# Patient Record
Sex: MALE | Race: WHITE | NOT HISPANIC OR LATINO | Employment: OTHER | URBAN - METROPOLITAN AREA
[De-identification: names, ages, dates, MRNs, and addresses within clinical notes are randomized per-mention and may not be internally consistent; named-entity substitution may affect disease eponyms.]

---

## 2017-01-10 ENCOUNTER — TRANSFERRED RECORDS (OUTPATIENT)
Dept: HEALTH INFORMATION MANAGEMENT | Facility: CLINIC | Age: 77
End: 2017-01-10

## 2017-02-21 ENCOUNTER — TRANSFERRED RECORDS (OUTPATIENT)
Dept: HEALTH INFORMATION MANAGEMENT | Facility: CLINIC | Age: 77
End: 2017-02-21

## 2017-06-24 ENCOUNTER — HEALTH MAINTENANCE LETTER (OUTPATIENT)
Age: 77
End: 2017-06-24

## 2017-06-30 ENCOUNTER — APPOINTMENT (OUTPATIENT)
Age: 77
Setting detail: DERMATOLOGY
End: 2017-07-02

## 2017-06-30 DIAGNOSIS — L82.0 INFLAMED SEBORRHEIC KERATOSIS: ICD-10-CM

## 2017-06-30 DIAGNOSIS — H61.03 CHONDRITIS OF EXTERNAL EAR: ICD-10-CM

## 2017-06-30 PROBLEM — H61.032 CHONDRITIS OF LEFT EXTERNAL EAR: Status: ACTIVE | Noted: 2017-06-30

## 2017-06-30 PROCEDURE — 99213 OFFICE O/P EST LOW 20 MIN: CPT | Mod: 25

## 2017-06-30 PROCEDURE — OTHER MIPS QUALITY: OTHER

## 2017-06-30 PROCEDURE — 17110 DESTRUCT B9 LESION 1-14: CPT

## 2017-06-30 PROCEDURE — OTHER LIQUID NITROGEN: OTHER

## 2017-06-30 PROCEDURE — OTHER TREATMENT REGIMEN: OTHER

## 2017-06-30 PROCEDURE — OTHER COUNSELING: OTHER

## 2017-06-30 ASSESSMENT — LOCATION ZONE DERM
LOCATION ZONE: FACE
LOCATION ZONE: EAR

## 2017-06-30 ASSESSMENT — LOCATION SIMPLE DESCRIPTION DERM
LOCATION SIMPLE: LEFT EAR
LOCATION SIMPLE: RIGHT CHEEK
LOCATION SIMPLE: INFERIOR FOREHEAD

## 2017-06-30 ASSESSMENT — LOCATION DETAILED DESCRIPTION DERM
LOCATION DETAILED: INFERIOR MID FOREHEAD
LOCATION DETAILED: LEFT ANTIHELIX
LOCATION DETAILED: RIGHT MEDIAL MALAR CHEEK

## 2017-06-30 NOTE — PROCEDURE: MIPS QUALITY
Detail Level: Detailed
Quality 431: Preventive Care And Screening: Unhealthy Alcohol Use - Screening: Patient screened for unhealthy alcohol use using a single question and scores less than 2 times per year
Quality 226: Preventive Care And Screening: Tobacco Use: Screening And Cessation Intervention: Patient screened for tobacco and never smoked
Quality 110: Preventive Care And Screening: Influenza Immunization: Influenza Immunization Administered during Influenza season
Quality 110: Preventive Care And Screening: Influenza Immunization: Influenza Immunization Ordered or Recommended, but not Administered
Quality 130: Documentation Of Current Medications In The Medical Record: Current Medications Documented

## 2017-06-30 NOTE — PROCEDURE: LIQUID NITROGEN
Medical Necessity Information: It is in your best interest to select a reason for this procedure from the list below. All of these items fulfill various CMS LCD requirements except the new and changing color options.
Consent: The patient's consent was obtained including but not limited to risks of crusting, scabbing, blistering, scarring, darker or lighter pigmentary change, recurrence, incomplete removal and infection.
Pared With?: 15 blade
Post-Care Instructions: I reviewed with the patient in detail post-care instructions. Patient is to wear sunprotection, and avoid picking at any of the treated lesions. Pt may apply Vaseline to crusted or scabbing areas.
Include Z78.9 (Other Specified Conditions Influencing Health Status) As An Associated Diagnosis?: No
Render Post-Care Instructions In Note?: yes
Duration Of Freeze Thaw-Cycle (Seconds): 10-15
Detail Level: Detailed
Number Of Freeze-Thaw Cycles: 1 freeze-thaw cycle
Medical Necessity Clause: This procedure was medically necessary because the lesions that were treated were:

## 2017-07-08 ENCOUNTER — APPOINTMENT (OUTPATIENT)
Dept: GENERAL RADIOLOGY | Facility: CLINIC | Age: 77
DRG: 184 | End: 2017-07-08
Attending: EMERGENCY MEDICINE
Payer: MEDICARE

## 2017-07-08 ENCOUNTER — HOSPITAL ENCOUNTER (INPATIENT)
Facility: CLINIC | Age: 77
LOS: 3 days | Discharge: HOME | DRG: 184 | End: 2017-07-11
Attending: EMERGENCY MEDICINE | Admitting: INTERNAL MEDICINE
Payer: MEDICARE

## 2017-07-08 DIAGNOSIS — S22.42XA CLOSED FRACTURE OF MULTIPLE RIBS OF LEFT SIDE, INITIAL ENCOUNTER: ICD-10-CM

## 2017-07-08 DIAGNOSIS — T14.8XXA ABRASION: ICD-10-CM

## 2017-07-08 DIAGNOSIS — K59.03 DRUG-INDUCED CONSTIPATION: Primary | ICD-10-CM

## 2017-07-08 DIAGNOSIS — J93.9 PNEUMOTHORAX ON LEFT: ICD-10-CM

## 2017-07-08 PROBLEM — S22.49XA MULTIPLE RIB FRACTURES: Status: ACTIVE | Noted: 2017-07-08

## 2017-07-08 LAB
ANION GAP SERPL CALCULATED.3IONS-SCNC: 7 MMOL/L (ref 3–14)
BUN SERPL-MCNC: 17 MG/DL (ref 7–30)
CALCIUM SERPL-MCNC: 9.2 MG/DL (ref 8.5–10.1)
CHLORIDE SERPL-SCNC: 109 MMOL/L (ref 94–109)
CO2 SERPL-SCNC: 27 MMOL/L (ref 20–32)
CREAT SERPL-MCNC: 0.85 MG/DL (ref 0.66–1.25)
ERYTHROCYTE [DISTWIDTH] IN BLOOD BY AUTOMATED COUNT: 13.7 % (ref 10–15)
GFR SERPL CREATININE-BSD FRML MDRD: 87 ML/MIN/1.7M2
GLUCOSE SERPL-MCNC: 125 MG/DL (ref 70–99)
HCT VFR BLD AUTO: 37.7 % (ref 40–53)
HGB BLD-MCNC: 12.2 G/DL (ref 13.3–17.7)
MCH RBC QN AUTO: 30.8 PG (ref 26.5–33)
MCHC RBC AUTO-ENTMCNC: 32.4 G/DL (ref 31.5–36.5)
MCV RBC AUTO: 95 FL (ref 78–100)
PLATELET # BLD AUTO: 204 10E9/L (ref 150–450)
POTASSIUM SERPL-SCNC: 3.8 MMOL/L (ref 3.4–5.3)
RADIOLOGIST FLAGS: ABNORMAL
RBC # BLD AUTO: 3.96 10E12/L (ref 4.4–5.9)
SODIUM SERPL-SCNC: 143 MMOL/L (ref 133–144)
WBC # BLD AUTO: 6.5 10E9/L (ref 4–11)

## 2017-07-08 PROCEDURE — 99222 1ST HOSP IP/OBS MODERATE 55: CPT | Performed by: SURGERY

## 2017-07-08 PROCEDURE — 99222 1ST HOSP IP/OBS MODERATE 55: CPT | Mod: AI | Performed by: INTERNAL MEDICINE

## 2017-07-08 PROCEDURE — 25000132 ZZH RX MED GY IP 250 OP 250 PS 637: Mod: GY | Performed by: INTERNAL MEDICINE

## 2017-07-08 PROCEDURE — 99285 EMERGENCY DEPT VISIT HI MDM: CPT | Mod: 25

## 2017-07-08 PROCEDURE — A9270 NON-COVERED ITEM OR SERVICE: HCPCS | Mod: GY | Performed by: EMERGENCY MEDICINE

## 2017-07-08 PROCEDURE — 96374 THER/PROPH/DIAG INJ IV PUSH: CPT

## 2017-07-08 PROCEDURE — 99238 HOSP IP/OBS DSCHRG MGMT 30/<: CPT | Performed by: INTERNAL MEDICINE

## 2017-07-08 PROCEDURE — 85027 COMPLETE CBC AUTOMATED: CPT | Performed by: EMERGENCY MEDICINE

## 2017-07-08 PROCEDURE — A9270 NON-COVERED ITEM OR SERVICE: HCPCS | Mod: GY | Performed by: INTERNAL MEDICINE

## 2017-07-08 PROCEDURE — 12000007 ZZH R&B INTERMEDIATE

## 2017-07-08 PROCEDURE — 25000132 ZZH RX MED GY IP 250 OP 250 PS 637: Mod: GY | Performed by: EMERGENCY MEDICINE

## 2017-07-08 PROCEDURE — 80048 BASIC METABOLIC PNL TOTAL CA: CPT | Performed by: EMERGENCY MEDICINE

## 2017-07-08 PROCEDURE — 71101 X-RAY EXAM UNILAT RIBS/CHEST: CPT | Mod: LT

## 2017-07-08 PROCEDURE — 96376 TX/PRO/DX INJ SAME DRUG ADON: CPT

## 2017-07-08 PROCEDURE — 25000128 H RX IP 250 OP 636: Performed by: EMERGENCY MEDICINE

## 2017-07-08 RX ORDER — HYDROMORPHONE HYDROCHLORIDE 1 MG/ML
0.25 INJECTION, SOLUTION INTRAMUSCULAR; INTRAVENOUS; SUBCUTANEOUS EVERY 10 MIN PRN
Status: COMPLETED | OUTPATIENT
Start: 2017-07-08 | End: 2017-07-08

## 2017-07-08 RX ORDER — OXYCODONE HYDROCHLORIDE 5 MG/1
5-10 TABLET ORAL EVERY 4 HOURS PRN
Status: DISCONTINUED | OUTPATIENT
Start: 2017-07-08 | End: 2017-07-11 | Stop reason: HOSPADM

## 2017-07-08 RX ORDER — ONDANSETRON 4 MG/1
4 TABLET, ORALLY DISINTEGRATING ORAL EVERY 6 HOURS PRN
Status: DISCONTINUED | OUTPATIENT
Start: 2017-07-08 | End: 2017-07-11 | Stop reason: HOSPADM

## 2017-07-08 RX ORDER — ACETAMINOPHEN 325 MG/1
975 TABLET ORAL 3 TIMES DAILY
Status: DISCONTINUED | OUTPATIENT
Start: 2017-07-08 | End: 2017-07-11 | Stop reason: HOSPADM

## 2017-07-08 RX ORDER — AMOXICILLIN 250 MG
1-2 CAPSULE ORAL 2 TIMES DAILY PRN
Status: DISCONTINUED | OUTPATIENT
Start: 2017-07-08 | End: 2017-07-11 | Stop reason: HOSPADM

## 2017-07-08 RX ORDER — NALOXONE HYDROCHLORIDE 0.4 MG/ML
.1-.4 INJECTION, SOLUTION INTRAMUSCULAR; INTRAVENOUS; SUBCUTANEOUS
Status: DISCONTINUED | OUTPATIENT
Start: 2017-07-08 | End: 2017-07-11 | Stop reason: HOSPADM

## 2017-07-08 RX ORDER — HYDROMORPHONE HYDROCHLORIDE 1 MG/ML
.3-.5 INJECTION, SOLUTION INTRAMUSCULAR; INTRAVENOUS; SUBCUTANEOUS
Status: DISCONTINUED | OUTPATIENT
Start: 2017-07-08 | End: 2017-07-11 | Stop reason: HOSPADM

## 2017-07-08 RX ORDER — ONDANSETRON 2 MG/ML
4 INJECTION INTRAMUSCULAR; INTRAVENOUS EVERY 6 HOURS PRN
Status: DISCONTINUED | OUTPATIENT
Start: 2017-07-08 | End: 2017-07-11 | Stop reason: HOSPADM

## 2017-07-08 RX ORDER — POLYETHYLENE GLYCOL 3350 17 G/17G
17 POWDER, FOR SOLUTION ORAL DAILY PRN
Status: DISCONTINUED | OUTPATIENT
Start: 2017-07-08 | End: 2017-07-11 | Stop reason: HOSPADM

## 2017-07-08 RX ORDER — OXYCODONE HYDROCHLORIDE 5 MG/1
5 TABLET ORAL ONCE
Status: COMPLETED | OUTPATIENT
Start: 2017-07-08 | End: 2017-07-08

## 2017-07-08 RX ORDER — PERPHENAZINE/AMITRIPTYLINE HCL 4 MG-25 MG
40 TABLET ORAL AT BEDTIME
COMMUNITY

## 2017-07-08 RX ADMIN — HYDROMORPHONE HYDROCHLORIDE 0.25 MG: 1 INJECTION, SOLUTION INTRAMUSCULAR; INTRAVENOUS; SUBCUTANEOUS at 20:54

## 2017-07-08 RX ADMIN — HYDROMORPHONE HYDROCHLORIDE 0.25 MG: 1 INJECTION, SOLUTION INTRAMUSCULAR; INTRAVENOUS; SUBCUTANEOUS at 18:10

## 2017-07-08 RX ADMIN — OXYCODONE HYDROCHLORIDE 5 MG: 5 TABLET ORAL at 17:18

## 2017-07-08 RX ADMIN — ACETAMINOPHEN 975 MG: 325 TABLET, FILM COATED ORAL at 21:57

## 2017-07-08 RX ADMIN — OMEPRAZOLE 20 MG: 20 CAPSULE, DELAYED RELEASE ORAL at 21:57

## 2017-07-08 RX ADMIN — HYDROMORPHONE HYDROCHLORIDE 0.25 MG: 1 INJECTION, SOLUTION INTRAMUSCULAR; INTRAVENOUS; SUBCUTANEOUS at 18:20

## 2017-07-08 ASSESSMENT — ENCOUNTER SYMPTOMS
NECK PAIN: 0
DIZZINESS: 0
BACK PAIN: 0
SHORTNESS OF BREATH: 0
LIGHT-HEADEDNESS: 0
ARTHRALGIAS: 0
HEADACHES: 0
WOUND: 1
VOMITING: 0
WEAKNESS: 0
ABDOMINAL PAIN: 0

## 2017-07-08 NOTE — ED NOTES
"[facility]  ED Nurse Handoff Report    ED Chief complaint: Fall (fell off short step stool. landed onto paint cans under left arm. fell about 2 ft. did not hit head. no LOC. abrasion to left ribs ) and Rib Pain      ED Diagnosis:   Final diagnoses:   Closed fracture of multiple ribs of left side, initial encounter   Pneumothorax on left   Abrasion       Code Status: Full Code    Allergies:   Allergies   Allergen Reactions     No Known Drug Allergies        Activity level - Baseline/Home:  Independent    Activity Level - Current:   Stand with Assist     Needed?: No    Isolation: No  Infection: Not Applicable    Bariatric?: No    Vital Signs:   Vitals:    07/08/17 1656 07/08/17 1730   BP: 118/68    Pulse: 55    Resp: 22    Temp: 98.6  F (37  C)    TempSrc: Temporal    SpO2: 97% 99%   Weight: 72.6 kg (160 lb)    Height: 1.753 m (5' 9\")        Cardiac Rhythm: ,        Pain level: 0-10 Pain Scale: 10    Is this patient confused?: No    Patient Report: Initial Complaint: Pt. Otherwise healthy, fell off one step ladder and hit arm and left side of chest on paint cans. Pt. States he did not get dizzy or have pain before he fell. No LOC reported, pt recalls all events.  Focused Assessment: Pt. In some distress with deep breathing and pain with movement. Rates pain 10/10. Sats 96-99% on RA. Pt. Is A/O x 4, lives at home with wife.   Tests Performed: labs, xray  Abnormal Results: left pneumothorax with rib fractures, labs pending  Treatments provided: Oxy IR 5 mg, dilaudid .25mg    Family Comments: Wife at home, hosting party tonight of 12 people.    OBS brochure/video discussed/provided to patient: N/A    ED Medications:   Medications   HYDROmorphone (PF) (DILAUDID) injection 0.25 mg (0.25 mg Intravenous Given 7/8/17 1810)   oxyCODONE (ROXICODONE) IR tablet 5 mg (5 mg Oral Given 7/8/17 1718)       Drips infusing?:  No      ED NURSE PHONE NUMBER: Amanda [phone]         "

## 2017-07-08 NOTE — PROGRESS NOTES
Trauma service  Full note dictated. Patient examined, films reviewed in detail. Low level fall with left chest pain. Several rib fractures, tiny left pneumothorax.   OK to eat, no need for campos. Will check CXR in morning. Follow with you.

## 2017-07-08 NOTE — IP AVS SNAPSHOT
[facility]    [address] [place] [address]    Phone:  [phone]                                       After Visit Summary   7/8/2017    Juwan Montoya    MRN: [id number]           After Visit Summary Signature Page     I have received my discharge instructions, and my questions have been answered. I have discussed any challenges I see with this plan with the nurse or doctor.    ..........................................................................................................................................  Patient/Patient Representative Signature      ..........................................................................................................................................  Patient Representative Print Name and Relationship to Patient    ..................................................               ................................................  Date                                            Time    ..........................................................................................................................................  Reviewed by Signature/Title    ...................................................              ..............................................  Date                                                            Time

## 2017-07-08 NOTE — H&P
[facility]    History and Physical  Hospitalist       Date of Admission:  7/8/2017    Assessment & Plan   Juwan Montoya is a relatively healthy 77 year old male with PMHx of GERD and hypertension (not presently on medications for BP) who presented to ED with with left sided rib pain after a mechanical fall this afternoon. He was found to have L sided rib fractures and a small pneumothorax. He is being admitted for ongoing pain management.     Left Rib Fractures after mechanical fall:  Occurred after a fall off a step-stool where he landed on some paint cans on his left side. Xrays of the chest and ribs show left 4th-5h posterior-lateral rib fractures and anterior 5th-6th rib fractures as well as a small apical pneumothorax.   -- trauma surgery consulted given consecutive fractures (Dr. Carmichael notified Dr. Robbins of patient's admission)  -- pain control -- have ordered Tylenol 1000mg TID plus prn oxycodone (5-10mg q4h prn) with prn IV dilaudid for breakthrough pain  -- could also consider use of lidoderm patch  -- pulmonary toilet with incentive spirometer, encourage frequent ambulation    Small L Apical Pneumothorax:  Dr. Carmichael reviewed imaging with Dr. Ortiz -- no need for chest tube placement at this time  -- supplemental O2  -- repeat CXR in AM or sooner if needed    GERD:  Chronic and stable on PPI (takes every other day)    Hypertension:  Well managed without medications at baseline.     Chronic Normocytic Anemia:  Mild. Hgb 12.2 on admission, this seems to be around his baseline.    No other hematomas or bruising was noted.    DVT Prophylaxis: PCDS  Code Status: Full Code    Disposition: Admitted under inpatient status. Anticipate discharge in 1-2d, pending respiratory status remains stable and once pain adequately controlled.    Adry Shelton DO    Primary Care Physician   Dipesh Oviedo MD    Chief Complaint   L chest wall pain after fall    History of Present  Illness   Juwan Montoya is a 77 year old male with PMHx of GERD and hypertension (well managed w/o BP medications) who presented to the ED this afternoon for evaluation of left sided rib pain which occurred after a fall. He states he fell off a step-stool (about 1 1/2 ft off the ground) while doing some work around the house and landed on top of 2-3 paint cans on his left side. He denies dizziness or lightheadedness preceding his fall. He developed left sided rib pain after the fall, prompting presentation to the ED. He has otherwise been in his usual status of health and denies recent illness or changes in medications. He does not endorse any pain elsewhere.     In the ED, he was seen by Dr. Carmichael. Afebrile and hemodynamically stable. Sats in 90s on RA. Labs okay. Xrays of the chest and ribs showed left sided rib fractures and a small left apical pneumothorax. Dr. Carmichael discussed the findings with Dr. Ortiz -- no indication for chest tube placement given his stable respiratory status. He was given oxycodone and IV dilaudid in the ED for management of his pain.     Past Medical History    GERD  Hypertension (not on meds)  BPH s/p TURP  Hx of nephrolithiasis    Past Surgical History   TURP in 1994  Hx of cystourethroscopy with laser ablation of uretereal stone in 2008  ORIF of R ankle fracture in 2009 with removal of hardware from ankle in 2010    Social History   Tobacco -- former smoker, smoked 1ppd x30yrs  EtOH -- drinks alcohol socially, not daily  Drugs -- no hx of drug use    Family History   Denied significant medical problems in family members    Review of Systems   The 10 point Review of Systems is negative unless otherwise stated per HPI.    Prior to Admission Medications   Prior to Admission medications    Medication Sig Last Dose Taking? Auth Provider   COENZYME Q-10 PO Take 100 mg by mouth At Bedtime 7/7/2017 at pm Yes Unknown, Entered By History   Lutein 40 MG CAPS Take 40 mg by mouth At Bedtime  7/7/2017 at pm Yes Unknown, Entered By History   TURMERIC PO Take 1 capsule by mouth At Bedtime 7/7/2017 at pm Yes Unknown, Entered By History   UNABLE TO FIND MEDICATION NAME: Prostate supplement 7/7/2017 at pm Yes Reported, Patient   PRILOSEC OTC 20 MG OR TBEC 1 TABLET EVERY OTHER DAY AT NIGHT 7/6/2017 at pm Yes Daniel Solitario MD       Allergies   Allergies   Allergen Reactions     No Known Drug Allergies        Physical Exam   Temp: 98.6  F (37  C) Temp src: Temporal BP: 118/68 Pulse: 55   Resp: 22 SpO2: 99 % O2 Device: None (Room air)      Vital Signs with Ranges  Temp:  [98.6  F (37  C)] 98.6  F (37  C)  Pulse:  [55] 55  Resp:  [22] 22  BP: (118)/(68) 118/68  SpO2:  [97 %-99 %] 99 %  160 lbs 0 oz    Constitutional: Resting comfortably, alert and conversing appropriately, NAD  HEENT: PERRLA, EOMI, MMM  Respiratory: CTAB, no wheeze/rales/rhonchi  Cardiovascular: HRRR, no MGR  GI: S, NT, ND, +BS  Skin: warm/dry, no ecchymosis or hematomas  Musculoskeletal: +pain with palpation of L chest wall  Neurologic: CNs intact, strength/sensation intact and symmetric in bilateral UE/LEs      Data   Data reviewed today:  I personally reviewed the chest x-ray image(s) showing small L apical pneumothorax.    Recent Labs  Lab 07/08/17  1800   WBC 6.5   HGB 12.2*   MCV 95         POTASSIUM 3.8   CHLORIDE 109   CO2 27   BUN 17   CR 0.85   ANIONGAP 7   CAT 9.2   *       Recent Results (from the past 24 hour(s))   Ribs XR, unilat 3 views + PA chest,  left   Result Value    Radiologist flags Left pneumothorax (AA)    Narrative    XR RIBS & CHEST LT 3VW7/8/2017 5:43 PM     HISTORY:   fall, lateral trauma    COMPARISON: None.    FINDINGS:  There are fractures of the left fourth and fifth ribs  posterior-lateral ribs. There also appears to be a fracture of the  left fifth and sixth anterior ribs. There is a small left apical  pneumothorax.      Impression    IMPRESSION:   1. Left rib fractures.  2. Small left  apical pneumothorax.    [Critical Result: Left pneumothorax]    Finding was identified on 7/8/2017 5:45 PM.     Dr. Carmichael was contacted by me on 7/8/2017 5:49 PM and verbalized  understanding of the critical result.

## 2017-07-08 NOTE — PHARMACY-ADMISSION MEDICATION HISTORY
Admission medication history interview status for the 7/8/2017  admission is complete. See EPIC admission navigator for prior to admission medications     Medication history source reliability:Moderate    Actions taken by pharmacist (provider contacted, etc):None     Additional medication history information not noted on PTA med list :None    Medication reconciliation/reorder completed by provider prior to medication history? No    Time spent in this activity: 10 minutes      Prior to Admission medications    Medication Sig Last Dose Taking? Auth Provider   COENZYME Q-10 PO Take 100 mg by mouth At Bedtime 7/7/2017 at pm Yes Unknown, Entered By History   Lutein 40 MG CAPS Take 40 mg by mouth At Bedtime 7/7/2017 at pm Yes Unknown, Entered By History   TURMERIC PO Take 1 capsule by mouth At Bedtime 7/7/2017 at pm Yes Unknown, Entered By History   UNABLE TO FIND MEDICATION NAME: Prostate supplement 7/7/2017 at pm Yes Reported, Patient   PRILOSEC OTC 20 MG OR TBEC 1 TABLET EVERY OTHER DAY AT NIGHT 7/6/2017 at pm Yes Daniel Solitario MD

## 2017-07-08 NOTE — IP AVS SNAPSHOT
MRN:[id number]                      After Visit Summary   7/8/2017    Juwan Montoya    MRN: [id number]           Thank you!     Thank you for choosing [facility] for your care. Our goal is always to provide you with excellent care. Hearing back from our patients is one way we can continue to improve our services. Please take a few minutes to complete the written survey that you may receive in the mail after you visit with us. Thank you!        Patient Information     Date Of Birth          1940        Designated Caregiver       Most Recent Value    Caregiver    Will someone help with your care after discharge? yes    Name of designated caregiver Jackeline    Phone number of caregiver [phone]    Caregiver address [address]      About your hospital stay     You were admitted on:  July 8, 2017 You last received care in the:  [facility] [facility]    You were discharged on:  July 11, 2017        Reason for your hospital stay       Fall, rib fractures, small pneumothorax.                  Who to Call     For medical emergencies, please call 911.  For non-urgent questions about your medical care, please call your primary care provider or clinic, [phone]          Attending Provider     Provider Specialty    Iban Carmichael MD Emergency Medicine    Adry Shelton DO Internal Medicine       Primary Care Provider Office Phone # Fax #    Dipesh Oviedo [phone]      After Care Instructions     Activity       Avoid strenuous activity or heavy lifting for several weeks until you are pain-free and are cleared by Dr. Oviedo for regular activity.    USE YOUR INCENTIVE SPIROMETER (clear/blue breathing tube) 10 times every hour for the next week while awake and able.            Diet       Follow this diet upon discharge: Regular Diet Adult            Discharge Instructions       If you have any questions or concerns please  "call the General Surgery / Trauma Surgery office at [phone]. You were seen by Dr. Helio Robbins and Dr. Adonis Tejada during your hospital stay.  Our clinic's name is [facility]. The address is [facility] [address], [address], [place], [place], [address]                  Follow-up Appointments     Follow-up and recommended labs and tests        Make a follow-up appointment to see Dr. Oviedo in the next 2 weeks. Call his office to make this appointment ([phone]).            Follow-up and recommended labs and tests        Follow up with primary care provider, Dipesh Oviedo MD, within 7 days recent hospitalization..  No follow up labs or test are needed.                  Pending Results     No orders found from 7/6/2017 to 7/9/2017.            Statement of Approval     Ordered          07/11/17 1219  I have reviewed and agree with all the recommendations and orders detailed in this document.  EFFECTIVE NOW     Approved and electronically signed by:  Praveena Griffith MD             Admission Information     Date & Time Provider Department Dept. Phone    7/8/2017 Adry Shelton DO [facility] [address] [facility] [phone]      Your Vitals Were     Blood Pressure Pulse Temperature Respirations Height Weight    136/75 70 98.2  F (36.8  C) (Oral) 18 1.753 m (5' 9\") 72.6 kg (160 lb)    Pulse Oximetry BMI (Body Mass Index)                97% 23.63 kg/m2          [employer] Information     [employer] lets you send messages to your doctor, view your test results, renew your prescriptions, schedule appointments and more. To sign up, go to [email or web address] . Click on \"Log in\" on the left side of the screen, which will take you to the Welcome page. Then click on \"Sign up Now\" on the right side of the page.     You will be asked to enter the access code listed below, as well as some personal information. Please follow the directions to create your username and password.     Your " access code is: [email or web address]  Expires: 10/9/2017 12:29 PM     Your access code will  in 90 days. If you need help or a new code, please call your [facility] clinic or [phone].        Care EveryWhere ID     This is your Care EveryWhere ID. This could be used by other organizations to access your [facility] medical records  [id number]        Equal Access to Services     MAURICIO BECKER : Hadii aad ku hadasho Soomaali, waaxda luqadaha, qaybta kaalmada adeegyada, aric thurman ejn salas ranjana magdalene . So [facility] [phone].    ATENCIÓN: Si habla español, tiene a cochran disposición servicios gratuitos de asistencia lingüística. Llame al [phone].    We comply with applicable federal civil rights laws and [place] laws. We do not discriminate on the basis of race, color, national origin, age, disability sex, sexual orientation or gender identity.               Review of your medicines      START taking        Dose / Directions    acetaminophen 325 MG tablet   Commonly known as:  TYLENOL   Used for:  Closed fracture of multiple ribs of left side, initial encounter        Dose:  975 mg   Take 3 tablets (975 mg) by mouth 3 times daily   Quantity:  100 tablet   Refills:  0       cyclobenzaprine 5 MG tablet   Commonly known as:  FLEXERIL   Used for:  Closed fracture of multiple ribs of left side, initial encounter        Dose:  5 mg   Take 1 tablet (5 mg) by mouth 3 times daily as needed for muscle spasms   Quantity:  20 tablet   Refills:  1       oxyCODONE 5 MG IR tablet   Commonly known as:  ROXICODONE   Used for:  Closed fracture of multiple ribs of left side, initial encounter        Dose:  5-10 mg   Take 1-2 tablets (5-10 mg) by mouth every 3 hours   Quantity:  30 tablet   Refills:  0       polyethylene glycol Packet   Commonly known as:  MIRALAX/GLYCOLAX   Used for:  Drug-induced constipation        Dose:  17 g   Take 17 g by mouth daily as needed for constipation   Quantity:  7 packet   Refills:  0        senna-docusate 8.6-50 MG per tablet   Commonly known as:  SENOKOT-S;PERICOLACE   Used for:  Drug-induced constipation        Dose:  1 tablet   Take 1 tablet by mouth 2 times daily   Quantity:  100 tablet   Refills:  0         CONTINUE these medicines which have NOT CHANGED        Dose / Directions    COENZYME Q-10 PO        Dose:  100 mg   Take 100 mg by mouth At Bedtime   Refills:  0       Lutein 40 MG Caps        Dose:  40 mg   Take 40 mg by mouth At Bedtime   Refills:  0       priLOSEC OTC 20 MG tablet   Used for:  Esophageal reflux   Generic drug:  omeprazole        1 TABLET EVERY OTHER DAY AT NIGHT   Quantity:  14   Refills:  0       TURMERIC PO        Dose:  1 capsule   Take 1 capsule by mouth At Bedtime   Refills:  0       UNABLE TO FIND        MEDICATION NAME: Prostate supplement   Refills:  0            Where to get your medicines      These medications were sent to [facility] [address] [place] [address]     Phone:  [phone]     cyclobenzaprine 5 MG tablet    polyethylene glycol Packet    senna-docusate 8.6-50 MG per tablet         Some of these will need a paper prescription and others can be bought over the counter. Ask your nurse if you have questions.     Bring a paper prescription for each of these medications     oxyCODONE 5 MG IR tablet       You don't need a prescription for these medications     acetaminophen 325 MG tablet                Protect others around you: Learn how to safely use, store and throw away your medicines at www.disposemymeds.org.             Medication List: This is a list of all your medications and when to take them. Check marks below indicate your daily home schedule. Keep this list as a reference.      Medications           Morning Afternoon Evening Bedtime As Needed    acetaminophen 325 MG tablet   Commonly known as:  TYLENOL   Take 3 tablets (975 mg) by mouth 3 times daily   Last time this was  given:  975 mg on 7/11/2017  9:01 AM                                         COENZYME Q-10 PO   Take 100 mg by mouth At Bedtime                                   cyclobenzaprine 5 MG tablet   Commonly known as:  FLEXERIL   Take 1 tablet (5 mg) by mouth 3 times daily as needed for muscle spasms   Last time this was given:  5 mg on 7/10/2017  8:35 AM                                   Lutein 40 MG Caps   Take 40 mg by mouth At Bedtime                                   oxyCODONE 5 MG IR tablet   Commonly known as:  ROXICODONE   Take 1-2 tablets (5-10 mg) by mouth every 3 hours   Last time this was given:  10 mg on 7/11/2017  1:59 PM                                   polyethylene glycol Packet   Commonly known as:  MIRALAX/GLYCOLAX   Take 17 g by mouth daily as needed for constipation                                   priLOSEC OTC 20 MG tablet   1 TABLET EVERY OTHER DAY AT NIGHT   Generic drug:  omeprazole                                   senna-docusate 8.6-50 MG per tablet   Commonly known as:  SENOKOT-S;PERICOLACE   Take 1 tablet by mouth 2 times daily   Last time this was given:  1 tablet on 7/10/2017  8:13 PM                                      TURMERIC PO   Take 1 capsule by mouth At Bedtime                                   UNABLE TO FIND   MEDICATION NAME: Prostate supplement   Last time this was given:  7/10/2017 12:26 PM                                          More Information        Treatment for Bone Bruise (Bone Contusion)  A bone bruise is an injury to a bone that is less severe than a bone fracture. Bone bruises are fairly common. They can happen to people of all ages. Any type of bone in your body can get a bone bruise. Other injuries often happen along with a bone bruise, such as damage to nearby ligaments.  Types of treatment  Treatment for a bone bruise may include:    Resting the bone or joint    Putting an ice pack on the area several times a day    Raising the injury above the level of your heart  to reduce swelling    Taking medicine to reduce pain and swelling    Wearing a brace or other device to limit movement, if needed  Your doctor may give you advice about your diet. This is because eating a diet that is rich in calcium, vitamin D, and protein can help you heal. Your doctor may ask you to not use certain over-the-counter medicines for pain. Some of these may delay normal bone healing. If you smoke, your doctor will advise you to stop smoking. Smoking can also delay bone healing.  Your health care provider will tell you how long you should avoid putting weight on your bone. Most bone bruises slowly heal over 2 to 4 months. A larger bone bruise may take longer to heal. You may not be able to return to sports activities for weeks or months. If your symptoms don t go away, your health care provider may give you an MRI.  Possible complications of a bone bruise  Most bone bruises heal without any problems. If your bone bruise is very large, your body may have trouble getting blood flow back to the area. This can cause avascular necrosis of the bone. This leads to death of that part of the bone.     When to call the health care provider  Call your health care provider if your symptoms don t start to get better in a few days. Call him or her right away if you have any severe symptoms, such as a high fever.      Date Last Reviewed: 7/21/2015 2000-2017 [employer]. [address] [place] [address]. All rights reserved. This information is not intended as a substitute for professional medical care. Always follow your healthcare professional's instructions.                Rib Fracture (Broken Rib)  Your ribs are curved bones in your chest. They help protect your lungs and expand and contract when you breathe. Children's ribs bend easily and can often withstand a blow or fall. But adult ribs are more likely to break (fracture) under stress. Even coughing or a hard sneeze can fracture a  rib.    When to go to the Emergency Room (ER)  Although they can be painful, most rib fractures aren't serious. But they often make it hard to cough or breathe deeply. Get medical care right away if you have:    Trouble breathing.    Nausea, vomiting, or stomach pain with a sore or bruised rib.    Pain that worsens over time.    An injury to the chest or stomach.  What to expect in the ER  Here is what will happen in the ER:     A healthcare provider will ask about your injury and examine you carefully.    An X-ray of your chest will likely be taken to show any major damage to ribs and lungs. However, ribs can undergo small breaks that do not show up on X-rays, even though they still hurt.    You may be given medicine to ease your discomfort.    Rarely, rib fractures can cause a lung to collapse or lead to bleeding in the chest. In these cases, a tube will be inserted into the chest to reinflate the lung or drain the blood.  Follow-up  You are likely to heal in 6 to 8 weeks. Most rib fractures heal on their own with no lasting effects. Call your healthcare provider right away if you notice any of these symptoms:    Increased chest pain    Shortness of breath    Fever    Coughing up blood  Date Last Reviewed: 9/26/2015 2000-2017 [employer]. [address] [place] [address], [place] [address]. All rights reserved. This information is not intended as a substitute for professional medical care. Always follow your healthcare professional's instructions.                Rib Fracture    You broke one or more ribs. This is called a rib fracture. Rib fractures do not require a cast like other bones. They will heal by themselves in about 4-6 weeks. The first 3-4 weeks will be the most painful because deep breathing, coughing, or changing position from sitting to lying down, may cause the broken ends to move slightly.  Home care    Rest. You should not be doing any heavy lifting or strenuous exertion until the pain goes  away.    It hurts to breathe when you have a broken rib. This puts you at risk of getting pneumonia from poor airflow through your lungs. To prevent this:    Take several very deep breaths once an hour while you're awake. Exhale through pursed lips as if you are blowing up a balloon. If possible, actually blow up a balloon or a rubber glove. This exercise builds up pressure inside the lung and prevents collapse of the small air sacs of the lung. This exercise may cause some pain at the site of injury, which is normal.    You may have gotten a breathing exercise device called an incentive spirometer. Use it at least 4 times a day, or as directed.    Apply an ice pack over the injured area for 15 to 20 minutes every 1 to 2 hours. You should do this for the first 24 to 48 hours. You can make an ice pack by filling a plastic bag that seals at the top with ice cubes and then wrapping it with a thin towel. Continue with ice packs as needed for the relief of pain and swelling.    You may use over-the-counter pain medicine to control pain, unless another pain medicine was prescribed. If you have chronic liver or kidney disease or ever had a stomach ulcer or GI bleeding, talk with your healthcare provider before using these medicines.    If your pain is not controlled, contact your healthcare provider. Sometimes a stronger pain medicine may be needed. A nerve block can be done in case of severe pain. It will numb the nerve between the ribs.  Follow-up care  Follow up with your healthcare provider, or as advised. Rarely, a broken rib will cause complications within the first few days that may not be evident during your initial exam. This can include collapsed lung, bleeding around the lung or into the abdomen, or pneumonia. Therefore, watch for the signs below.  If X-rays were taken, you will be told of any new findings that may affect your care.  Call 911  Call 911 if you have:    Dizziness, weakness or fainting    Shortness  of breath with or without chest discomfort    New or worsening abdominal pain    Discomfort in other areas of your upper body such as your shoulders, jaw, neck, or arms  When to seek medical advice  Call your healthcare provider right away if any of these occur:    Increasing chest pain with breathing    Fever of 100.4 F (38 C) or above lasting for 24 to 48 hours    Congested cough  Date Last Reviewed: 12/3/2015    4782-7602 [employer]. [address] [place] [address], [place] [address]. All rights reserved. This information is not intended as a substitute for professional medical care. Always follow your healthcare professional's instructions.

## 2017-07-08 NOTE — ED PROVIDER NOTES
"  History     Chief Complaint:  Fall and Rib Pain    HPI   Juwan Montoya is an otherwise healthy 77 year old male who presents to the ED for evaluation of left rib pain after a mechanical fall approximately 45 minutes prior to arrival.  The patient reports that he fell off a small step stool 1 foot off the ground and landed on 2-3 paint cans that were on the ground. The patient fell onto his left lateral chest. The patient reports he has had persistent pain in his left ribs since the fall and is concerned about a possible rib fracture, prompting the visit today. On arrival, the patient reports he has pain and an abrasion over his left lateral chest.  The patient also describes an intermittent \"rattling\" sensation in his chest when he breathes, but denies any shortness of breath. He denies any shoulder, arm, abdominal pain, or any other injuries associated with the fall. The patient did not hit his head or lose consciousness.      Allergies:  No known drug allergies    Medications:    Cholecalciferol  CO Q  Lutein  Prilosec    Past Medical History:    Asymptomatic varicose veins  Depression  GERD  BPH  Hypertension     Past Surgical History:    TURP  Laser ablation of ureteral stone  ORIF right ankle    Family History:    History reviewed. No pertinent family history.     Social History:  Smoking status: Former smoker, quit 1961  Alcohol use: Socially   Marital Status:   [2]     Review of Systems   Respiratory: Negative for shortness of breath.    Cardiovascular: Positive for chest pain (rib pain).   Gastrointestinal: Negative for abdominal pain and vomiting.   Musculoskeletal: Negative for arthralgias, back pain, gait problem and neck pain.   Skin: Positive for wound.   Neurological: Negative for dizziness, weakness, light-headedness and headaches.   All other systems reviewed and are negative.      Physical Exam     Physical Exam     Patient Vitals for the past 24 hrs:   BP Temp Temp src Pulse Resp " "SpO2 Height Weight   07/08/17 1730 - - - - - 99 % - -   07/08/17 1656 118/68 98.6  F (37  C) Temporal 55 22 97 % 1.753 m (5' 9\") 72.6 kg (160 lb)       General: Alert and Interactive.   Head: No signs of trauma.   Mouth/Throat: Oropharynx is clear and moist.   Eyes: Conjunctivae are normal. Pupils are equal, round, and reactive to light.   Neck: Normal range of motion. No nuchal rigidity.   CV: Normal rate and regular rhythm.    Resp: Equal breath sounds. Effort normal and breath sounds normal. No respiratory distress.   GI: Soft. There is no tenderness or guarding.   MSK: Normal range of motion. no edema.   Neuro: The patient is alert and oriented to person, place, and time.  PERRLA, EOMI, strength in upper/lower extremities normal and symmetrical.   Sensation normal. Speech normal.  GCS eye subscore is 4. GCS verbal subscore is 5. GCS motor subscore is 6.   Skin: Abrasion proximal volar forearm. Abrasion over the left lateral chest. Skin is warm and dry. No rash noted.    Psych: normal mood and affect. behavior is normal.     Emergency Department Course   Imaging:  Radiographic findings were communicated with the patient who voiced understanding of the findings.    X-ray Ribs, unilateral, and PA Chest, left views:  1. Left rib fractures.  2. Small left apical pneumothorax.  Result per radiology.     Laboratory:  CBC: HGB 12.2(L)  WNL (WBC 6.5, )   BMP: Glucose 125(H) WNL (Creatinine 0.85)    Interventions:  1718: Oxycodone 5 mg oral  1810: Dilaudid 0.25 mg IV    Emergency Department Course:  Past medical records, nursing notes, and vitals reviewed.  1708: I performed an exam of the patient and obtained history, as documented above.  The patient was sent for a rib x-ray while in the emergency department, findings above.    1749: I spoke to Dr. Velasquez of radiology regarding x-ray results.   1753: I rechecked the patient. Explained findings to the patient.    1756: I spoke to Dr. Ortiz of thoracic surgery who " states there is no indication for a chest tube or surgery.     1804: I spoke to Dr. Shelton of the hospitalist service who accepts the patient for admission.   1805: I spoke to Dr. Robbins of trauma surgery.   IV inserted and blood drawn.    Findings and plan explained to the Patient who consents to admission.   Discussed the patient with Dr. Shelton, who will admit the patient to a surgical bed for further monitoring, evaluation, and treatment.     Impression & Plan      Medical Decision Making:  This patient is presenting to the ER after falling as described above. The mechanism of injury does not warrant a trauma activation. The patient has multiple rib fractures and a small apical pneumothorax. No indication for chest tube placement at this time. Consultation was made with Dr. Ortiz as well as Dr. Robbins.   Because of the patient's pain level that requires IV narcotics to control, he will require admission to the hospital. He will also require admission to be monitored for increasing pneumothorax that could result in respiratory compromise and/or tension pneumothorax.      Diagnosis:    ICD-10-CM   1. Closed fracture of multiple ribs of left side, initial encounter S22.42XA   2. Pneumothorax on left J93.9   3. Abrasion T14.8     Disposition: Admitted to a surgical bed under the care of Dr. Nikita Owens  7/8/2017    EMERGENCY DEPARTMENT    I, Darya Owens, am serving as a scribe at 5:08 PM on 7/8/2017 to document services personally performed by Iban Carmichael MD based on my observations and the provider's statements to me.        Iban Carmichale MD  07/09/17 0102

## 2017-07-09 ENCOUNTER — APPOINTMENT (OUTPATIENT)
Dept: GENERAL RADIOLOGY | Facility: CLINIC | Age: 77
DRG: 184 | End: 2017-07-09
Attending: INTERNAL MEDICINE
Payer: MEDICARE

## 2017-07-09 PROCEDURE — A9270 NON-COVERED ITEM OR SERVICE: HCPCS | Mod: GY | Performed by: INTERNAL MEDICINE

## 2017-07-09 PROCEDURE — 25000132 ZZH RX MED GY IP 250 OP 250 PS 637: Mod: GY | Performed by: INTERNAL MEDICINE

## 2017-07-09 PROCEDURE — 25000128 H RX IP 250 OP 636: Performed by: INTERNAL MEDICINE

## 2017-07-09 PROCEDURE — 99232 SBSQ HOSP IP/OBS MODERATE 35: CPT | Performed by: INTERNAL MEDICINE

## 2017-07-09 PROCEDURE — 71010 XR CHEST PORT 1 VW: CPT

## 2017-07-09 PROCEDURE — 12000007 ZZH R&B INTERMEDIATE

## 2017-07-09 PROCEDURE — 99231 SBSQ HOSP IP/OBS SF/LOW 25: CPT | Performed by: SURGERY

## 2017-07-09 RX ORDER — CYCLOBENZAPRINE HCL 5 MG
5 TABLET ORAL 3 TIMES DAILY PRN
Status: DISCONTINUED | OUTPATIENT
Start: 2017-07-09 | End: 2017-07-11 | Stop reason: HOSPADM

## 2017-07-09 RX ADMIN — OXYCODONE HYDROCHLORIDE 10 MG: 5 TABLET ORAL at 05:19

## 2017-07-09 RX ADMIN — ACETAMINOPHEN 975 MG: 325 TABLET, FILM COATED ORAL at 21:57

## 2017-07-09 RX ADMIN — OXYCODONE HYDROCHLORIDE 5 MG: 5 TABLET ORAL at 01:14

## 2017-07-09 RX ADMIN — CYCLOBENZAPRINE HYDROCHLORIDE 5 MG: 5 TABLET, FILM COATED ORAL at 16:30

## 2017-07-09 RX ADMIN — OXYCODONE HYDROCHLORIDE 10 MG: 5 TABLET ORAL at 20:01

## 2017-07-09 RX ADMIN — OXYCODONE HYDROCHLORIDE 10 MG: 5 TABLET ORAL at 14:16

## 2017-07-09 RX ADMIN — HYDROMORPHONE HYDROCHLORIDE 0.3 MG: 1 INJECTION, SOLUTION INTRAMUSCULAR; INTRAVENOUS; SUBCUTANEOUS at 17:12

## 2017-07-09 RX ADMIN — ACETAMINOPHEN 975 MG: 325 TABLET, FILM COATED ORAL at 09:05

## 2017-07-09 RX ADMIN — MENTHOL 1 PATCH: 205.5 PATCH TOPICAL at 16:17

## 2017-07-09 RX ADMIN — HYDROMORPHONE HYDROCHLORIDE 0.3 MG: 1 INJECTION, SOLUTION INTRAMUSCULAR; INTRAVENOUS; SUBCUTANEOUS at 06:45

## 2017-07-09 RX ADMIN — HYDROMORPHONE HYDROCHLORIDE 0.3 MG: 1 INJECTION, SOLUTION INTRAMUSCULAR; INTRAVENOUS; SUBCUTANEOUS at 02:48

## 2017-07-09 RX ADMIN — ACETAMINOPHEN 975 MG: 325 TABLET, FILM COATED ORAL at 16:17

## 2017-07-09 RX ADMIN — OMEPRAZOLE 20 MG: 20 CAPSULE, DELAYED RELEASE ORAL at 21:57

## 2017-07-09 RX ADMIN — OXYCODONE HYDROCHLORIDE 10 MG: 5 TABLET ORAL at 09:06

## 2017-07-09 NOTE — PLAN OF CARE
Problem: Goal Outcome Summary  Goal: Goal Outcome Summary  Outcome: No Change  VSS, on 2L O2. LS are clear, BS are audible, passing gas. Left sharlene dressing is CDI.  Had x-ray done this morning, awaiting result. Pain controlled with PRN dilaudid, and oxycodone. Pt voiding adequately. SBA of 1.

## 2017-07-09 NOTE — PLAN OF CARE
Problem: Goal Outcome Summary  Goal: Goal Outcome Summary  Outcome: Improving  VSS on 2LPM. A/Ox4. Wound on Left elbow and left rib, dsg's CDI. Pain controlled with PRN Oxycodone and sched tylenol. Up with SBA.  Regular diet. BS faint, Flatus +. LS clear. IS to 2000.

## 2017-07-09 NOTE — PLAN OF CARE
Problem: Goal Outcome Summary  Goal: Goal Outcome Summary  Outcome: Improving  VSS, up ambulating, tylenol, oxycodone controlling pain, crackles to the Left lower and middle lobe, IS instructions given, TCDB, tolerating meals, dressings  CDI.

## 2017-07-09 NOTE — CONSULTS
General Surgery Trauma Consultation/H&P    Juwan Montoya MRN#: [id number]   Age: 77 year old YOB: 1940     Date of Admission:          7/8/2017  Reason for consult/H&P: Fall with chest injury   Surgeon:      Helio Robbins MD                  Chief Complaint:   Left chest pain         History of Present Illness:   This patient is a 77 year old  male who presented to the [facility] ER with left chest pain after a fall. Denies fever, chills, nausea, vomiting, change in BM or urination. No loss of consciousness, no abdominal, leg, arm, or head pain. He fell off a  One step ladder and hit paint cans with his left chest.  Denies having any previous episodes of chest trauma or abdominal surgery. He had a TURP.  History is obtained from the patient and chart.         Past Medical History:    has a past medical history of Asymptomatic varicose veins; Bladder stone; Chronic depressive personality disorder; Esophageal reflux; Hepatic cyst; Hypertrophy (benign) of prostate (9/94); Nephrolithiasis; S/P TURP; and Unspecified essential hypertension.          Past Surgical History:     Past Surgical History:   Procedure Laterality Date     C ANESTH,REMOVAL OF PROSTATE  9/1994    TURP     SURGICAL HISTORY OF -   7/2008    Cystourethroscopy, with laser ablation of ureteral stone     SURGICAL HISTORY OF -   3/2009    ORIF right ankle fracture     SURGICAL HISTORY OF -   6/2010    Removal of hardware, right ankle            Medications:     Prior to Admission medications    Medication Sig Start Date End Date Taking? Authorizing Provider   COENZYME Q-10 PO Take 100 mg by mouth At Bedtime   Yes Unknown, Entered By History   Lutein 40 MG CAPS Take 40 mg by mouth At Bedtime   Yes Unknown, Entered By History   TURMERIC PO Take 1 capsule by mouth At Bedtime   Yes Unknown, Entered By History   UNABLE TO FIND MEDICATION NAME: Prostate supplement   Yes Reported, Patient   PRILOSEC OTC 20 MG OR TBEC 1 TABLET  "EVERY OTHER DAY AT NIGHT 4/12/07  Yes Daniel Solitario MD            Allergies:     Allergies   Allergen Reactions     No Known Drug Allergies             Social History:     Social History   Substance Use Topics     Smoking status: Former Smoker     Packs/day: 1.00     Years: 30.00     Smokeless tobacco: Former User     Quit date: 12/6/1961      Comment: 2 packs per year     Alcohol use 0.0 oz/week     0 Standard drinks or equivalent per week      Comment: socially             Family History:    This patient has no significant relevant family history.  Family history is reviewed in detail.          Review of Systems:   Brief ROS is negative other than noted in the HPI.  C: NEGATIVE for fever, chills, change in weight  R: NEGATIVE for significant cough or SOB  CV: NEGATIVE for chest pain, palpitations or peripheral edema  GI:  NEGATIVE for dysuria, heartburn, or change in bowel habits  H: NEGATIVE for bleeding problems         Physical Exam:   Blood pressure 151/79, pulse 68, temperature 98.5  F (36.9  C), temperature source Oral, resp. rate 20, height 1.753 m (5' 9\"), weight 72.6 kg (160 lb), SpO2 96 %.       General - This is a well developed, well nourished male .  HEENT - Normocephalic. Atraumatic. Moist mucous membranes. Pupils equal.  No scleral icterus. Nose normal. No clinical skull or facial fractures.  Neck - Supple without masses. No cervical adenopathy or thyromegaly  Lungs - Breathing not labored  Chest -Very tender on left, anterior and posterior. CVA's nontender  Heart - Regular rate & rhythm   Abdomen - Soft, nontender, nondistended with +bowel sounds, no organomegaly.  Extremities - Moves all extremities. Warm without edema.  Neurologic - Nonfocal. Good strength in all fours          Data:   Labs:  Recent Labs   Lab Test  07/08/17   1800  12/09/16   1416  10/26/16   1220   WBC  6.5  4.8  4.2   HGB  12.2*  12.4*  11.7*   HCT  37.7*  39.0*  37.2*   PLT  204  207  245     Recent Labs   Lab Test  " 07/08/17   1800  06/27/16   1615  10/14/12   1320   POTASSIUM  3.8  4.7  4.0   CHLORIDE  109  108  104   CO2  27  30  25   BUN  17  16  16   CR  0.85  0.81  0.85     Recent Labs   Lab Test  06/27/16   1615  10/14/12   1320  02/23/11   1011   BILITOTAL  0.3  0.5  0.5   ALT  34  68  12   AST  18  84*  28   ALKPHOS  44  60  54   LIPASE   --   33   --      Recent Labs   Lab Test  06/22/10   1211   INR  0.99     Recent Labs   Lab Test  07/08/17   1800  06/27/16   1615  10/14/12   1320   CAT  9.2  9.4  9.1     Recent Labs   Lab Test  07/08/17   1800  06/27/16   1615  10/14/12   1320  02/23/11   1011   ANIONGAP  7  4  9  6   ALBUMIN   --   4.1  4.0  4.0       CT scan of the abdomen: not done    CXR: left rib fractures, tiny apical pneumothorax. Films reviewed in detail.         t         Assessment:   Low altitude fall with left rib fractures. I discussed with the patient how the main risk is pneumonia, and how pulmonary toilet will be key.          Plan:    Admit for observation, pain control. Check CXR in AM.       I have discussed the history, physical, and plan with the patient.   Helio Robbins M.D.

## 2017-07-09 NOTE — PROGRESS NOTES
[facility]    Hospitalist Progress Note  Shreyas Tompkins MD    Assessment & Plan     77 year old male with PMHx of GERD and Hypertension, not on any medication, who was admitted on 7/8/17, after a mechanical fall a step-stool, with trauma to the left side of his ribcage on 2-3 paint cans. Work up done on 7/8/17 revealed, normal BMP. CBC revealed, Hb 12.2, WBC 6.5 and Plts 204. X-rays Ribs and Left Chest on 7/8/17 revealed, fractures of the left fourth and fifth ribs posterior-lateral ribs. There also appears to be a fracture of the left fifth and sixth anterior ribs. There is a small left apical pneumothorax. He was admitted for ongoing pain management.      1. Left Ribs Fractures after mechanical fall: Appreciate trauma surgery review. For conservative management.   Continue scheduled Tylenol 975mg po tid. For Oxycodone IR prn. For IV dilaudid prn. For Flexeril 5mg prn. Continue incentive spirometry prn.      2. Small Left Apical Pneumothorax: resolved. Chest x-rays done on 7/9/17 revealed, the previously noted small left apical pneumothorax is not well appreciated. Mild atelectasis of the left lung base. There is left lateral chest wall subcutaneous emphysema. The right lung is clear. Continue O2.      3. GERD: Omeprazole 20mg po qhs.     4. Hypertension: Controlled. Not on any medication    5. Chronic Normocytic Anemia: Hb was 12.2g/dl on 7/8/17.       DVT Prophylaxis: Pneumatic Compression Devices  Code Status: Full Code    Disposition: Expected discharge in 1 day.      Interval History   The pt reported having persistent left sided ribcage pain and has taking frequent doses Oxycodone IR tabs.    -Data reviewed today: I reviewed all new labs and imaging results over the last 24 hours. I personally reviewed no images or EKG's today.    Physical Exam   Temp: 97.5  F (36.4  C) Temp src: Oral BP: 128/68 Pulse: 58   Resp: 16 SpO2: 94 % O2 Device: None (Room air) Oxygen Delivery: 2  LPM  Vitals:    07/08/17 1656   Weight: 72.6 kg (160 lb)     Vital Signs with Ranges  Temp:  [97.5  F (36.4  C)-98.6  F (37  C)] 97.5  F (36.4  C)  Pulse:  [52-68] 58  Resp:  [16-22] 16  BP: (104-151)/(64-80) 128/68  SpO2:  [94 %-99 %] 94 %  I/O last 3 completed shifts:  In: 420 [P.O.:420]  Out: -     Constitutional: Elderly white male, awake, cooperative, no apparent distress, O2 Sats 95% on RA  Respiratory: BS vesicular bilaterally, few left basal insp crackles, no wheezing  Cardiovascular: S1 and S2, reg, no murmur noted  GI: Soft, non-distended, non-tender, no masses, BS present+  Skin/Integumen: No rashes  Extremities: No pedal edema    Medications        omeprazole  20 mg Oral At Bedtime     acetaminophen  975 mg Oral TID       Data     Recent Labs  Lab 07/08/17  1800   WBC 6.5   HGB 12.2*   MCV 95         POTASSIUM 3.8   CHLORIDE 109   CO2 27   BUN 17   CR 0.85   ANIONGAP 7   CAT 9.2   *       Recent Results (from the past 24 hour(s))   Ribs XR, unilat 3 views + PA chest,  left   Result Value    Radiologist flags Left pneumothorax (AA)    Narrative    XR RIBS & CHEST LT 3VW7/8/2017 5:43 PM     HISTORY:   fall, lateral trauma    COMPARISON: None.    FINDINGS:  There are fractures of the left fourth and fifth ribs  posterior-lateral ribs. There also appears to be a fracture of the  left fifth and sixth anterior ribs. There is a small left apical  pneumothorax.      Impression    IMPRESSION:   1. Left rib fractures.  2. Small left apical pneumothorax.    [Critical Result: Left pneumothorax]    Finding was identified on 7/8/2017 5:45 PM.     Dr. Carmichael was contacted by me on 7/8/2017 5:49 PM and verbalized  understanding of the critical result.     BERNICE DWYER MD   XR Chest Port 1 View    Narrative    XR CHEST PORT 1 VW 7/9/2017 5:05 AM    HISTORY: Pneumothorax.    COMPARISON: July 8, 2017.      Impression    IMPRESSION: The previously noted small left apical pneumothorax is not  well  appreciated on today's exam. Mild atelectasis of the left lung  base. There is left lateral chest wall subcutaneous emphysema. The  right lung is clear.    LILIANA MCCLURE MD

## 2017-07-10 PROCEDURE — A9270 NON-COVERED ITEM OR SERVICE: HCPCS | Mod: GY | Performed by: INTERNAL MEDICINE

## 2017-07-10 PROCEDURE — 25000132 ZZH RX MED GY IP 250 OP 250 PS 637: Mod: GY | Performed by: INTERNAL MEDICINE

## 2017-07-10 PROCEDURE — 99231 SBSQ HOSP IP/OBS SF/LOW 25: CPT | Performed by: SURGERY

## 2017-07-10 PROCEDURE — 99231 SBSQ HOSP IP/OBS SF/LOW 25: CPT | Performed by: INTERNAL MEDICINE

## 2017-07-10 PROCEDURE — 12000007 ZZH R&B INTERMEDIATE

## 2017-07-10 RX ADMIN — OXYCODONE HYDROCHLORIDE 10 MG: 5 TABLET ORAL at 08:35

## 2017-07-10 RX ADMIN — OMEPRAZOLE 20 MG: 20 CAPSULE, DELAYED RELEASE ORAL at 22:39

## 2017-07-10 RX ADMIN — ACETAMINOPHEN 975 MG: 325 TABLET, FILM COATED ORAL at 08:35

## 2017-07-10 RX ADMIN — OXYCODONE HYDROCHLORIDE 10 MG: 5 TABLET ORAL at 12:48

## 2017-07-10 RX ADMIN — OXYCODONE HYDROCHLORIDE 10 MG: 5 TABLET ORAL at 00:23

## 2017-07-10 RX ADMIN — OXYCODONE HYDROCHLORIDE 10 MG: 5 TABLET ORAL at 04:32

## 2017-07-10 RX ADMIN — ACETAMINOPHEN 975 MG: 325 TABLET, FILM COATED ORAL at 22:38

## 2017-07-10 RX ADMIN — CYCLOBENZAPRINE HYDROCHLORIDE 5 MG: 5 TABLET, FILM COATED ORAL at 00:23

## 2017-07-10 RX ADMIN — CYCLOBENZAPRINE HYDROCHLORIDE 5 MG: 5 TABLET, FILM COATED ORAL at 08:35

## 2017-07-10 RX ADMIN — OXYCODONE HYDROCHLORIDE 5 MG: 5 TABLET ORAL at 20:13

## 2017-07-10 RX ADMIN — SENNOSIDES AND DOCUSATE SODIUM 1 TABLET: 8.6; 5 TABLET ORAL at 20:13

## 2017-07-10 RX ADMIN — ACETAMINOPHEN 975 MG: 325 TABLET, FILM COATED ORAL at 17:36

## 2017-07-10 NOTE — PROGRESS NOTES
Surgery    Pain control today. Has been getting out of bed with assistance. Using incentive spirometry without issues. Still a little worried about going home with current pain. Denies shortness of breath or chest pain.    Chest-diminished breath sounds bilateral.    A/P  Improving but still worried about pain control. Would recommend continuing current pain regime and can D/C home when he feels comfortable. Continue working on incentive spirometry and ambulation.    Adonis Tejada M.D.  [facility]  [phone]

## 2017-07-10 NOTE — PROGRESS NOTES
[facility]  Hospitalist Progress Note  Date of service (when I saw the patient) 07/10/2017:         Assessment and Plan:   Mr Juwan Montoya is a relatively healthy 77 year old male with PMHx of GERD and hypertension (not presently on medications for BP) who presented to ED with with left sided rib pain after a mechanical fall this afternoon. He was found to have L sided rib fractures and a small pneumothorax. He is being admitted for ongoing pain management.       Left Rib Fractures after mechanical fall:   Occurred after a fall off a step-stool where he landed on some paint cans on his left side.  - Xrays of the chest and ribs show left 4th-5h posterior-lateral rib fractures and anterior 5th-6th rib fractures as well as a small apical pneumothorax.   - appreciate trauma surgery consult given consecutive fractures (Dr. Carmichael notified Dr. Robbins of patient's admission)  - pain control with scheduled Tylenol 975mg TID & icy hot patch plus has prn Flexeril tid,oxycodone (5-10mg q4h prn) with prn IV dilaudid for breakthrough pain  - encouraged pulmonary toilet with incentive spirometer, encourage frequent ambulation      Small L Apical Pneumothorax:  Dr. Carmichael reviewed imaging with Dr. Ortiz -- no need for chest tube placement at this time  - supplemental O2  - repeat CXR yest showed ml atelectasis L lung base, L lateral wall subcutaneous emphysema. The sm L apical pneumothorax not well appreciated.or sooner if needed      GERD:  - Chronic and stable on PPI      Hypertension:  - controlled without medications at baseline.       Chronic Normocytic Anemia:  - Hgb 12.2 on admission, this seems to be around his baseline.    No other hematomas or bruising was noted.     DVT Prophylaxis: PCDS  Code Status: Full Code     Disposition: poss home with HH, PT/OT in am if pain better controlled               Interval History:   Comfortable at rest with current medication but causing some drowsiness. Pt  "concerned about d/c home as his wife not able to help him due to her health conditions.                Medications:       sodium chloride (PF)  3 mL Intracatheter Q8H     menthol   Transdermal Q8H     omeprazole  20 mg Oral At Bedtime     acetaminophen  975 mg Oral TID     sodium chloride (PF), cyclobenzaprine, menthol, menthol, naloxone, oxyCODONE, HYDROmorphone, senna-docusate, polyethylene glycol, ondansetron **OR** ondansetron               Physical Exam:   Blood pressure 139/72, pulse 54, temperature 98.2  F (36.8  C), temperature source Oral, resp. rate 16, height 1.753 m (5' 9\"), weight 72.6 kg (160 lb), SpO2 90 %.  Wt Readings from Last 4 Encounters:   17 72.6 kg (160 lb)   16 71.2 kg (157 lb)   10/26/16 73.5 kg (162 lb)   16 72.4 kg (159 lb 11.2 oz)         Vital Sign Ranges  Temperature Temp  Av.2  F (36.8  C)  Min: 98.1  F (36.7  C)  Max: 98.5  F (36.9  C)   Blood pressure Systolic (24hrs), Av , Min:100 , Max:139        Diastolic (24hrs), Av, Min:58, Max:72      Pulse Pulse  Av.8  Min: 54  Max: 74   Respirations Resp  Av  Min: 16  Max: 16   Pulse oximetry SpO2  Av %  Min: 87 %  Max: 97 %         Intake/Output Summary (Last 24 hours) at 07/10/17 1318  Last data filed at 07/10/17 1228   Gross per 24 hour   Intake             1850 ml   Output                0 ml   Net             1850 ml       Constitutional: Awake, alert, cooperative, no apparent distress   Lungs: Diminished  bilaterally, no crackles or wheezing   Cardiovascular: Regular rate and rhythm, normal S1 and S2, and no murmur noted   Abdomen: Normal bowel sounds, soft, non-distended, non-tender   Skin: No rashes, no cyanosis, no edema                   Data:   All laboratory data reviewed    "

## 2017-07-10 NOTE — PLAN OF CARE
Problem: Goal Outcome Summary  Goal: Goal Outcome Summary  Outcome: Improving  A/Ox4, AVSS, tolerating reg diet. SBA with ambulation. Pain controlled with oxycodone and flexeril. Abrasions to left side CDI. Crackles in LLL. IS and activity encouraged. Pulses +2, CMS intact. Voiding adequate. +f;atus, awaiting BM, stool softeners initiated.

## 2017-07-10 NOTE — PLAN OF CARE
Problem: Goal Outcome Summary  Goal: Goal Outcome Summary  Outcome: Improving  A/O. VSS. Crackles on left, encouraging IS, on RA during the evening but needed 2L O2 NC when sleeping. Bruising/skin tear on left elbow and left/back chest. Pain well managed with oxycodone and flexeril through the night. Possible d/c today.

## 2017-07-10 NOTE — PROGRESS NOTES
"Spiritual Assessment Progress Note  [facility]      PRIMARY FOCUS:      Emotional/spiritual/Yarsanism distress    Support for coping    ILLNESS CIRCUMSTANCES:    Reviewed documentation. Reflective conversation shared with Juwan which integrated elements of illness and family narratives.         Context of Serious Illness/Symptom(s) - Pt is here for a fall that caused broken ribs and significant pain.  During visit, pt slowly revealed some difficult family circumstances that have resulted in some estrangement from only daughter as well as some mental health issues with his spouse.  Pt asked me to return later today when his wife is here so that she and I might have some time to talk.  He did enjoy telling me about his work for the [employer] service.  He got a chance to talk about himself which I surmise doesn't happen too often.      Resources for Support - Wife though when I asked about the relationship, he said, \"it isn't too bad.\"  He dearly loves his daughter but doesn't see her often.      DISTRESS:      Emotional/ ExistentialRelational Distress - He has supported his wife through some difficult times but it isn't clear whether he has the support that he needs.    Spiritual/Adventism Distress - not discussed    Social/Cultural/EconomicDistress - not discussed.       SPIRITUAL/Rastafarian (Coping):      Denominational/Stephania - Christian    Spiritual Practice(s) - not discussed    Emotional/Existential/ Relationall/Connections - not discussed.      GOALS OF CARE:    Goals of Care - not discussed.    Meaning/Sense-Making - not discussed.      PLAN: I will follow up later today when wife is here.        Natacha Camarena  Chaplain Resident  Pager [phone]  "

## 2017-07-11 VITALS
WEIGHT: 160 LBS | HEIGHT: 69 IN | SYSTOLIC BLOOD PRESSURE: 136 MMHG | DIASTOLIC BLOOD PRESSURE: 75 MMHG | RESPIRATION RATE: 18 BRPM | BODY MASS INDEX: 23.7 KG/M2 | HEART RATE: 70 BPM | TEMPERATURE: 98.2 F | OXYGEN SATURATION: 97 %

## 2017-07-11 PROCEDURE — A9270 NON-COVERED ITEM OR SERVICE: HCPCS | Mod: GY | Performed by: INTERNAL MEDICINE

## 2017-07-11 PROCEDURE — 25000132 ZZH RX MED GY IP 250 OP 250 PS 637: Mod: GY | Performed by: INTERNAL MEDICINE

## 2017-07-11 RX ORDER — CYCLOBENZAPRINE HCL 5 MG
5 TABLET ORAL 3 TIMES DAILY PRN
Qty: 20 TABLET | Refills: 1 | Status: SHIPPED | OUTPATIENT
Start: 2017-07-11 | End: 2018-07-20

## 2017-07-11 RX ORDER — POLYETHYLENE GLYCOL 3350 17 G/17G
17 POWDER, FOR SOLUTION ORAL DAILY PRN
Qty: 7 PACKET | Refills: 0 | Status: SHIPPED | OUTPATIENT
Start: 2017-07-11 | End: 2018-07-20

## 2017-07-11 RX ORDER — AMOXICILLIN 250 MG
1 CAPSULE ORAL 2 TIMES DAILY
Qty: 100 TABLET | Refills: 0 | Status: SHIPPED | OUTPATIENT
Start: 2017-07-11 | End: 2018-07-20

## 2017-07-11 RX ORDER — OXYCODONE HYDROCHLORIDE 5 MG/1
5-10 TABLET ORAL
Qty: 30 TABLET | Refills: 0 | Status: SHIPPED | OUTPATIENT
Start: 2017-07-11 | End: 2017-07-13

## 2017-07-11 RX ORDER — ACETAMINOPHEN 325 MG/1
975 TABLET ORAL 3 TIMES DAILY
Qty: 100 TABLET
Start: 2017-07-11 | End: 2018-07-20

## 2017-07-11 RX ADMIN — OXYCODONE HYDROCHLORIDE 10 MG: 5 TABLET ORAL at 13:59

## 2017-07-11 RX ADMIN — ACETAMINOPHEN 975 MG: 325 TABLET, FILM COATED ORAL at 09:01

## 2017-07-11 RX ADMIN — OXYCODONE HYDROCHLORIDE 5 MG: 5 TABLET ORAL at 00:57

## 2017-07-11 RX ADMIN — OXYCODONE HYDROCHLORIDE 5 MG: 5 TABLET ORAL at 05:02

## 2017-07-11 RX ADMIN — OXYCODONE HYDROCHLORIDE 10 MG: 5 TABLET ORAL at 09:01

## 2017-07-11 NOTE — PLAN OF CARE
Problem: Goal Outcome Summary  Goal: Goal Outcome Summary  Outcome: Improving  AVSS ex O2 sats 85-90; TEA, LLL, RLL crackles. Pain @ 3 with oxy. Pain up to 8-9 on left side when moving. Reg diet, stand-by assist. IS up to 1500.

## 2017-07-11 NOTE — PLAN OF CARE
Problem: Goal Outcome Summary  Goal: Goal Outcome Summary  Outcome: Adequate for Discharge Date Met:  07/11/17  VSS, A&O, Lung sounds diminished with crackles in the left LLL. Bowel sounds active, passing gas. Ambulating the hallway with SBA. D/C instructions discussed with patient. Oxycodone given to patient. Discharged to home with wife. Questions answered.

## 2017-07-11 NOTE — PLAN OF CARE
Problem: Goal Outcome Summary  Goal: Goal Outcome Summary  A&O, VSS, CMS intact. Up independently. Regular diet, tolerating well. 2L oxygen at over night. Pain controlled with scheduled Tylenol and Oxycodone. Pt complaining Flexeril making him drowsy. Abrasions on left ribs and arm. Crackles on left, encouraged the IS. Possible d/c tomorrow. Nursing continue to monitor.

## 2017-07-11 NOTE — DISCHARGE SUMMARY
[facility]    Discharge Summary  Hospitalist    Date of Admission:  7/8/2017  Date of Discharge:  7/11/2017  Discharging Provider: Praveena Griffith MD, MD    Discharge Diagnoses   1. Left 4,5 & 6 rib Fracture due to mechanical fall  2. Small Left apical pneumothorax  3. Chest pain due to rib fracture  4. Chronic anemia  5. Hypertesion    History of Present Illness   Juwan Montoya is an 77 year old male who presented after a fall (fell off short step stool. landed onto paint cans under left arm. fell about 2 ft. did not hit head. no LOC. abrasion to left ribs ) and Rib Pain    Hospital Course   Juwan Montoya was admitted on 7/8/2017.     Left Rib Fractures after mechanical fall:   Occurred after a fall off a step-stool where he landed on some paint cans on his left side.  - Xrays of the chest and ribs show left 4th-5h posterior-lateral rib fractures and anterior 5th-6th rib fractures as well as a small apical pneumothorax.   - appreciate trauma surgery consult given consecutive fractures (Dr. Carmichael notified Dr. Robbins of patient's admission)  - pain control with scheduled Tylenol 975mg TID & icy hot patch plus has prn Flexeril tid,oxycodone (5-10mg q4h prn) with prn IV dilaudid for breakthrough pain  - encouraged pulmonary toilet with incentive spirometer, encourage frequent ambulation       Small L Apical Pneumothorax:  Dr. Carmichael reviewed imaging with Dr. Ortiz -- no need for chest tube placement at this time  - supplemental O2  - repeat CXR yest showed ml atelectasis L lung base, L lateral wall subcutaneous emphysema. The sm L apical pneumothorax not well appreciated.or sooner if needed       GERD:  - Chronic and stable on PPI       Hypertension:  - controlled without medications at baseline.        Chronic Normocytic Anemia:  - Hgb 12.2 on admission, this seems to be around his baseline.    No other hematomas or bruising was noted.      DVT Prophylaxis: PCDS    Active  Problems:    Multiple rib fractures      Significant Results and Procedures   Imaging:   cxr 7/8 reported as  There are fractures of the left fourth and fifth ribs  posterior-lateral ribs. There also appears to be a fracture of the  left fifth and sixth anterior ribs. There is a small left apical  Pneumothorax.     f/u CXR 7/9 reported as-  The previously noted small left apical pneumothorax is not  well appreciated on today's exam. Mild atelectasis of the left lung  base. There is left lateral chest wall subcutaneous emphysema. The  right lung is clear.        Pending Results - none  Unresulted Labs Ordered in the Past 30 Days of this Admission     No orders found from 5/9/2017 to 7/9/2017.          Code Status   Full Code       Primary Care Physician   Dipesh Oviedo MD    Physical Exam   Temp: 98.2  F (36.8  C) Temp src: Oral BP: 136/75 Pulse: 70   Resp: 18 SpO2: 97 % O2 Device: None (Room air)    Vitals:    07/08/17 1656   Weight: 72.6 kg (160 lb)     Vital Signs with Ranges  Temp:  [97.7  F (36.5  C)-98.4  F (36.9  C)] 98.2  F (36.8  C)  Pulse:  [70-88] 70  Resp:  [14-24] 18  BP: (109-148)/(62-85) 136/75  SpO2:  [90 %-97 %] 97 %  I/O last 3 completed shifts:  In: 840 [P.O.:840]  Out: -     Examination:  Well-built well-nourished. In NAD  Heart: Regular rhythm. S1S2, no murmurs, rubs,gallops  Lungs: Clear to auscultation. And no rhonchi rales or wheezing heard.  Abdomen: Soft and nontender. Bowel sounds present.  Extremities: No swelling.  Neuro:A,A,Ox3, motor sensory grossly intact    Discharge Disposition   Discharged to home  Condition at discharge: Stable    Consultations This Hospital Stay   TRAUMA SURGERY IP CONSULT    Time Spent on this Encounter   Praveena BREWER MD, personally saw the patient today and spent less than or equal to 30 minutes discharging this patient.    Discharge Orders     Follow-up and recommended labs and tests    Make a follow-up appointment to see Dr. Oviedo  in the next 2 weeks. Call his office to make this appointment ([phone]).     Activity   Avoid strenuous activity or heavy lifting for several weeks until you are pain-free and are cleared by Dr. Oviedo for regular activity.    USE YOUR INCENTIVE SPIROMETER (clear/blue breathing tube) 10 times every hour for the next week while awake and able.     Discharge Instructions   If you have any questions or concerns please call the General Surgery / Trauma Surgery office at [phone]. You were seen by Dr. Helio Robbins and Dr. Adonis Tejada during your hospital stay.  Our clinic's name is [facility]. The address is [address] [place] [address], [facility] [address], [place], [place], [address]     Reason for your hospital stay   Fall, rib fractures, small pneumothorax.     Follow-up and recommended labs and tests    Follow up with primary care provider, Dipesh Oviedo MD, within 7 days recent hospitalization..  No follow up labs or test are needed.     Full Code     Diet   Follow this diet upon discharge: Regular Diet Adult       Discharge Medications   Current Discharge Medication List      START taking these medications    Details   cyclobenzaprine (FLEXERIL) 5 MG tablet Take 1 tablet (5 mg) by mouth 3 times daily as needed for muscle spasms  Qty: 20 tablet, Refills: 1    Associated Diagnoses: Closed fracture of multiple ribs of left side, initial encounter      oxyCODONE (ROXICODONE) 5 MG IR tablet Take 1-2 tablets (5-10 mg) by mouth every 3 hours  Qty: 30 tablet, Refills: 0    Associated Diagnoses: Closed fracture of multiple ribs of left side, initial encounter      acetaminophen (TYLENOL) 325 MG tablet Take 3 tablets (975 mg) by mouth 3 times daily  Qty: 100 tablet    Associated Diagnoses: Closed fracture of multiple ribs of left side, initial encounter      polyethylene glycol (MIRALAX/GLYCOLAX) Packet Take 17 g by mouth daily as needed for constipation  Qty: 7 packet, Refills: 0    Associated Diagnoses: Drug-induced  constipation      senna-docusate (SENOKOT-S;PERICOLACE) 8.6-50 MG per tablet Take 1 tablet by mouth 2 times daily  Qty: 100 tablet, Refills: 0    Associated Diagnoses: Drug-induced constipation         CONTINUE these medications which have NOT CHANGED    Details   COENZYME Q-10 PO Take 100 mg by mouth At Bedtime      Lutein 40 MG CAPS Take 40 mg by mouth At Bedtime      TURMERIC PO Take 1 capsule by mouth At Bedtime      UNABLE TO FIND MEDICATION NAME: Prostate supplement      PRILOSEC OTC 20 MG OR TBEC 1 TABLET EVERY OTHER DAY AT NIGHT  Qty: 14, Refills: 0    Associated Diagnoses: Esophageal reflux           Allergies   Allergies   Allergen Reactions     No Known Drug Allergies      Data   Most Recent 3 CBC's:  Recent Labs   Lab Test  07/08/17   1800  12/09/16   1416  10/26/16   1220   WBC  6.5  4.8  4.2   HGB  12.2*  12.4*  11.7*   MCV  95  96  98   PLT  204  207  245      Most Recent 3 BMP's:  Recent Labs   Lab Test  07/08/17   1800  06/27/16   1615  10/14/12   1320   NA  143  142  138   POTASSIUM  3.8  4.7  4.0   CHLORIDE  109  108  104   CO2  27  30  25   BUN  17  16  16   CR  0.85  0.81  0.85   ANIONGAP  7  4  9   CAT  9.2  9.4  9.1   GLC  125*  92  103*     Most Recent 2 LFT's:  Recent Labs   Lab Test  06/27/16   1615  10/14/12   1320   AST  18  84*   ALT  34  68   ALKPHOS  44  60   BILITOTAL  0.3  0.5     Most Recent INR's and Anticoagulation Dosing History:  Anticoagulation Dose History     Recent Dosing and Labs Latest Ref Rng & Units 7/14/2008 3/12/2009 6/22/2010    INR 0.86 - 1.14 0.94 0.92 0.99        Most Recent 3 Troponin's:  Recent Labs   Lab Test  10/14/12   1320   TROPI  <0.012     Most Recent Cholesterol Panel:  Recent Labs   Lab Test  06/27/16   1615   CHOL  168   LDL  104*   HDL  46   TRIG  89     Most Recent 6 Bacteria Isolates From Any Culture (See EPIC Reports for Culture Details):  Recent Labs   Lab Test  04/16/13   1210   CULT  No Beta Streptococcus isolated     Most Recent TSH, T4 and A1c  Labs:No lab results found.

## 2017-07-11 NOTE — PROGRESS NOTES
"General Surgery  Admission Date: 7/8/2017  Today's Date: 7/11/2017      SUBJECTIVE  Pain better controlled today with oxycodone every 4 hours. Tolerating diet, ambulating without difficulty. Using IS regularly, getting up to 2000. Feels more comfortable with pain control.        OBJECTIVE  /85  Pulse 88  Temp 97.8  F (36.6  C) (Axillary)  Resp 18  Ht 1.753 m (5' 9\")  Wt 72.6 kg (160 lb)  SpO2 95%  BMI 23.63 kg/m2  I/O last 3 completed shifts:  In: 840 [P.O.:840]  Out: -   General: awake, alert, NAD  Head: normocephalic, atraumatic  Respiratory: non-labored breathing  Chest: mild bruising left upper chest, mild tenderness to palpation      ASSESSMENT/PLAN  Juwan Montoya is a 77 year old male admitted with left-sided rib fractures and small pneumothorax after a mechanical fall   - pneumothorax has remained stable on repeat chest x ray yesterday   - pain controlled with PO meds   - stable for discharge to home later today   - discussed discharge instructions, will follow-up with Dr. Dipesh Oviedo (his PCP) within 2 weeks        Aleida Lundberg PA-C  Office: [phone]  Pager: [phone]   "

## 2017-07-13 ENCOUNTER — OFFICE VISIT (OUTPATIENT)
Dept: FAMILY MEDICINE | Facility: CLINIC | Age: 77
End: 2017-07-13
Payer: MEDICARE

## 2017-07-13 VITALS
OXYGEN SATURATION: 99 % | DIASTOLIC BLOOD PRESSURE: 84 MMHG | SYSTOLIC BLOOD PRESSURE: 126 MMHG | TEMPERATURE: 96.8 F | HEART RATE: 81 BPM | HEIGHT: 69 IN | WEIGHT: 160 LBS | BODY MASS INDEX: 23.7 KG/M2

## 2017-07-13 DIAGNOSIS — Z09 HOSPITAL DISCHARGE FOLLOW-UP: Primary | ICD-10-CM

## 2017-07-13 DIAGNOSIS — S22.42XA CLOSED FRACTURE OF MULTIPLE RIBS OF LEFT SIDE, INITIAL ENCOUNTER: ICD-10-CM

## 2017-07-13 PROCEDURE — 99495 TRANSJ CARE MGMT MOD F2F 14D: CPT | Performed by: NURSE PRACTITIONER

## 2017-07-13 RX ORDER — OXYCODONE HYDROCHLORIDE 5 MG/1
5-10 TABLET ORAL EVERY 4 HOURS PRN
Qty: 30 TABLET | Refills: 0 | Status: SHIPPED | OUTPATIENT
Start: 2017-07-13 | End: 2018-07-20

## 2017-07-13 NOTE — NURSING NOTE
"Chief Complaint   Patient presents with     Hospital F/U       Initial /84 (BP Location: Right arm, Patient Position: Chair, Cuff Size: Adult Regular)  Pulse 81  Temp 96.8  F (36  C) (Oral)  Ht 5' 9\" (1.753 m)  Wt 160 lb (72.6 kg)  SpO2 99%  BMI 23.63 kg/m2 Estimated body mass index is 23.63 kg/(m^2) as calculated from the following:    Height as of this encounter: 5' 9\" (1.753 m).    Weight as of this encounter: 160 lb (72.6 kg).  Medication Reconciliation: complete     VEE Braun      "

## 2017-07-13 NOTE — MR AVS SNAPSHOT
"              After Visit Summary   2017    Juwan Montoya    MRN: [id number]           Patient Information     Date Of Birth          1940        Visit Information        Provider Department      2017 4:30 PM Krish Lobo APRN CNP [facility]        Today's Diagnoses     Hospital discharge follow-up    -  1    Closed fracture of multiple ribs of left side, initial encounter           Follow-ups after your visit        Who to contact     If you have questions or need follow up information about today's clinic visit or your schedule please contact [facility] directly at [phone].  Normal or non-critical lab and imaging results will be communicated to you by LinPrimhart, letter or phone within 4 business days after the clinic has received the results. If you do not hear from us within 7 days, please contact the clinic through LinPrimhart or phone. If you have a critical or abnormal lab result, we will notify you by phone as soon as possible.  Submit refill requests through VCV or call your pharmacy and they will forward the refill request to us. Please allow 3 business days for your refill to be completed.          Additional Information About Your Visit        MyChart Information     VCV lets you send messages to your doctor, view your test results, renew your prescriptions, schedule appointments and more. To sign up, go to [email or web address] . Click on \"Log in\" on the left side of the screen, which will take you to the Welcome page. Then click on \"Sign up Now\" on the right side of the page.     You will be asked to enter the access code listed below, as well as some personal information. Please follow the directions to create your username and password.     Your access code is: [email or web address]  Expires: 10/9/2017 12:29 PM     Your access code will  in 90 days. If you need help or a new code, please call your [facility] or [phone].        Care " "EveryWhere ID     This is your Care EveryWhere ID. This could be used by other organizations to access your [facility] medical records  [id number]        Your Vitals Were     Pulse Temperature Height Pulse Oximetry BMI (Body Mass Index)       81 96.8  F (36  C) (Oral) 5' 9\" (1.753 m) 99% 23.63 kg/m2        Blood Pressure from Last 3 Encounters:   07/13/17 126/84   07/11/17 136/75   12/09/16 115/75    Weight from Last 3 Encounters:   07/13/17 160 lb (72.6 kg)   07/08/17 160 lb (72.6 kg)   12/09/16 157 lb (71.2 kg)              Today, you had the following     No orders found for display         Today's Medication Changes          These changes are accurate as of: 7/13/17  6:08 PM.  If you have any questions, ask your nurse or doctor.               These medicines have changed or have updated prescriptions.        Dose/Directions    oxyCODONE 5 MG IR tablet   Commonly known as:  ROXICODONE   This may have changed:    - when to take this  - reasons to take this   Used for:  Closed fracture of multiple ribs of left side, initial encounter   Changed by:  Krish Lobo APRN CNP        Dose:  5-10 mg   Take 1-2 tablets (5-10 mg) by mouth every 4 hours as needed for moderate to severe pain   Quantity:  30 tablet   Refills:  0            Where to get your medicines      Some of these will need a paper prescription and others can be bought over the counter.  Ask your nurse if you have questions.     Bring a paper prescription for each of these medications     oxyCODONE 5 MG IR tablet                Primary Care Provider Office Phone # Fax #    Dipesh Oviedo [phone]       [facility] [place] [address]  [place] [address]        Equal Access to Services     MAURICIO BECKER AH: Zully Erickson, walee granda, qaybta kaalmada ce, aric jorgensen. So [facility] [phone].    ATENCIÓN: Si habla español, tiene a cochran disposición servicios " louie de asistencia lingüística. Dave melara [phone].    We comply with applicable federal civil rights laws and [place] laws. We do not discriminate on the basis of race, color, national origin, age, disability sex, sexual orientation or gender identity.            Thank you!     Thank you for choosing [facility]  for your care. Our goal is always to provide you with excellent care. Hearing back from our patients is one way we can continue to improve our services. Please take a few minutes to complete the written survey that you may receive in the mail after your visit with us. Thank you!             Your Updated Medication List - Protect others around you: Learn how to safely use, store and throw away your medicines at www.disposemymeds.org.          This list is accurate as of: 7/13/17  6:08 PM.  Always use your most recent med list.                   Brand Name Dispense Instructions for use Diagnosis    acetaminophen 325 MG tablet    TYLENOL    100 tablet    Take 3 tablets (975 mg) by mouth 3 times daily    Closed fracture of multiple ribs of left side, initial encounter       COENZYME Q-10 PO      Take 100 mg by mouth At Bedtime        cyclobenzaprine 5 MG tablet    FLEXERIL    20 tablet    Take 1 tablet (5 mg) by mouth 3 times daily as needed for muscle spasms    Closed fracture of multiple ribs of left side, initial encounter       Lutein 40 MG Caps      Take 40 mg by mouth At Bedtime        oxyCODONE 5 MG IR tablet    ROXICODONE    30 tablet    Take 1-2 tablets (5-10 mg) by mouth every 4 hours as needed for moderate to severe pain    Closed fracture of multiple ribs of left side, initial encounter       polyethylene glycol Packet    MIRALAX/GLYCOLAX    7 packet    Take 17 g by mouth daily as needed for constipation    Drug-induced constipation       priLOSEC OTC 20 MG tablet   Generic drug:  omeprazole     14    1 TABLET EVERY OTHER DAY AT NIGHT    Esophageal reflux       senna-docusate  8.6-50 MG per tablet    SENOKOT-S;PERICOLACE    100 tablet    Take 1 tablet by mouth 2 times daily    Drug-induced constipation       TURMERIC PO      Take 1 capsule by mouth At Bedtime        UNABLE TO FIND      MEDICATION NAME: Prostate supplement

## 2017-07-13 NOTE — PROGRESS NOTES
SUBJECTIVE:                                                    Juwan Montoya is a 77 year old male who presents to clinic today for the following health issues:          Hospital Follow-up Visit:    Hospital/Nursing Home/IP Rehab Facility: [facility]  Date of Admission: 7/8/17  Date of Discharge: 7/11/17  Reason(s) for Admission: Left 4,5 & 6 rib Fracture due to mechanical fall            Problems taking medications regularly:  None       Medication changes since discharge: None       Problems adhering to non-medication therapy:  None    Summary of hospitalization:  [facility] discharge summary reviewed  Diagnostic Tests/Treatments reviewed.  Follow up needed: none  Other Healthcare Providers Involved in Patient s Care:         None  Update since discharge: improved.     Post Discharge Medication Reconciliation: discharge medications reconciled, continue medications without change.  Plan of care communicated with patient     Coding guidelines for this visit:  Type of Medical   Decision Making Face-to-Face Visit       within 7 Days of discharge Face-to-Face Visit        within 14 days of discharge   Moderate Complexity [id number]   High Complexity [id number]            Feeling improved from discharge after he was admitted for rib fractures with pneumo after a fall   Taking 2 tyl twice a day with 2 oxycodone every 4 hours but has just started weaning down  Using incentive spirometer which he thinks helps him a lot   No constipation   No concerns today       Past Medical History:   Diagnosis Date     Asymptomatic varicose veins      Bladder stone      Chronic depressive personality disorder      Esophageal reflux      Hepatic cyst      Hypertrophy (benign) of prostate 9/94    turp operation     Nephrolithiasis      S/P TURP      Unspecified essential hypertension      Past Surgical History:   Procedure Laterality Date     C ANESTH,REMOVAL OF PROSTATE  9/1994    TURP     SURGICAL  "HISTORY OF -   7/2008    Cystourethroscopy, with laser ablation of ureteral stone     SURGICAL HISTORY OF -   3/2009    ORIF right ankle fracture     SURGICAL HISTORY OF -   6/2010    Removal of hardware, right ankle     Social History   Substance Use Topics     Smoking status: Former Smoker     Packs/day: 1.00     Years: 30.00     Smokeless tobacco: Former User     Quit date: 12/6/1961      Comment: 2 packs per year     Alcohol use 0.0 oz/week     0 Standard drinks or equivalent per week      Comment: socially     Current Outpatient Prescriptions   Medication Sig Dispense Refill     oxyCODONE (ROXICODONE) 5 MG IR tablet Take 1-2 tablets (5-10 mg) by mouth every 4 hours as needed for moderate to severe pain 30 tablet 0     cyclobenzaprine (FLEXERIL) 5 MG tablet Take 1 tablet (5 mg) by mouth 3 times daily as needed for muscle spasms 20 tablet 1     acetaminophen (TYLENOL) 325 MG tablet Take 3 tablets (975 mg) by mouth 3 times daily 100 tablet      polyethylene glycol (MIRALAX/GLYCOLAX) Packet Take 17 g by mouth daily as needed for constipation 7 packet 0     senna-docusate (SENOKOT-S;PERICOLACE) 8.6-50 MG per tablet Take 1 tablet by mouth 2 times daily 100 tablet 0     COENZYME Q-10 PO Take 100 mg by mouth At Bedtime       Lutein 40 MG CAPS Take 40 mg by mouth At Bedtime       TURMERIC PO Take 1 capsule by mouth At Bedtime       UNABLE TO FIND MEDICATION NAME: Prostate supplement       PRILOSEC OTC 20 MG OR TBEC 1 TABLET EVERY OTHER DAY AT NIGHT 14 0     Allergies   Allergen Reactions     No Known Drug Allergies        Reviewed and updated as needed this visit by clinical staff and provider      Review of Systems  Detailed as above       /84 (BP Location: Right arm, Patient Position: Chair, Cuff Size: Adult Regular)  Pulse 81  Temp 96.8  F (36  C) (Oral)  Ht 5' 9\" (1.753 m)  Wt 160 lb (72.6 kg)  SpO2 99%  BMI 23.63 kg/m2      Physical Exam   Constitutional: He appears well-developed.   HENT:   Head: " Normocephalic.   Eyes: Conjunctivae are normal.   Pulmonary/Chest: Effort normal and breath sounds normal. He exhibits tenderness (left).   Musculoskeletal: Normal range of motion.   Neurological: He is alert.   Skin: Skin is warm and dry.   Bruising left lateral torso   Psychiatric: He has a normal mood and affect. Judgment normal.       Assessment and Plan:       ICD-10-CM    1. Hospital discharge follow-up Z09    2. Closed fracture of multiple ribs of left side, initial encounter S22.42XA oxyCODONE (ROXICODONE) 5 MG IR tablet       Doing well since discharge   Continue working on weaning down on oxycodone   Do not take more than 1g Tylenol per dose   F/u with PCP katyan       Krish Lobo APRN, CNP  [facility]

## 2018-07-20 ENCOUNTER — OFFICE VISIT (OUTPATIENT)
Dept: FAMILY MEDICINE | Facility: CLINIC | Age: 78
End: 2018-07-20
Payer: MEDICARE

## 2018-07-20 VITALS
DIASTOLIC BLOOD PRESSURE: 76 MMHG | HEART RATE: 73 BPM | HEIGHT: 67 IN | SYSTOLIC BLOOD PRESSURE: 126 MMHG | OXYGEN SATURATION: 98 % | WEIGHT: 159 LBS | BODY MASS INDEX: 24.96 KG/M2 | TEMPERATURE: 97 F

## 2018-07-20 DIAGNOSIS — Z12.11 ENCOUNTER FOR SCREENING FOR MALIGNANT NEOPLASM OF COLON: ICD-10-CM

## 2018-07-20 DIAGNOSIS — R42 DIZZINESS: ICD-10-CM

## 2018-07-20 DIAGNOSIS — D64.9 ANEMIA, UNSPECIFIED TYPE: ICD-10-CM

## 2018-07-20 DIAGNOSIS — Z23 NEED FOR VACCINATION: ICD-10-CM

## 2018-07-20 DIAGNOSIS — Z00.00 ROUTINE GENERAL MEDICAL EXAMINATION AT A HEALTH CARE FACILITY: Primary | ICD-10-CM

## 2018-07-20 DIAGNOSIS — K21.9 GASTROESOPHAGEAL REFLUX DISEASE WITHOUT ESOPHAGITIS: ICD-10-CM

## 2018-07-20 DIAGNOSIS — R10.12 ABDOMINAL PAIN, LEFT UPPER QUADRANT: ICD-10-CM

## 2018-07-20 LAB
ALBUMIN UR-MCNC: NEGATIVE MG/DL
APPEARANCE UR: CLEAR
BILIRUB UR QL STRIP: NEGATIVE
COLOR UR AUTO: YELLOW
ERYTHROCYTE [DISTWIDTH] IN BLOOD BY AUTOMATED COUNT: 14 % (ref 10–15)
GLUCOSE UR STRIP-MCNC: NEGATIVE MG/DL
HCT VFR BLD AUTO: 38.6 % (ref 40–53)
HGB BLD-MCNC: 12 G/DL (ref 13.3–17.7)
HGB UR QL STRIP: NEGATIVE
KETONES UR STRIP-MCNC: NEGATIVE MG/DL
LEUKOCYTE ESTERASE UR QL STRIP: NEGATIVE
MCH RBC QN AUTO: 30.4 PG (ref 26.5–33)
MCHC RBC AUTO-ENTMCNC: 31.1 G/DL (ref 31.5–36.5)
MCV RBC AUTO: 98 FL (ref 78–100)
NITRATE UR QL: NEGATIVE
PH UR STRIP: 5.5 PH (ref 5–7)
PLATELET # BLD AUTO: 205 10E9/L (ref 150–450)
RBC # BLD AUTO: 3.95 10E12/L (ref 4.4–5.9)
SOURCE: NORMAL
SP GR UR STRIP: >1.03 (ref 1–1.03)
UROBILINOGEN UR STRIP-ACNC: 0.2 EU/DL (ref 0.2–1)
WBC # BLD AUTO: 4.6 10E9/L (ref 4–11)

## 2018-07-20 PROCEDURE — G0009 ADMIN PNEUMOCOCCAL VACCINE: HCPCS | Performed by: INTERNAL MEDICINE

## 2018-07-20 PROCEDURE — 90670 PCV13 VACCINE IM: CPT | Performed by: INTERNAL MEDICINE

## 2018-07-20 PROCEDURE — 99397 PER PM REEVAL EST PAT 65+ YR: CPT | Mod: 25 | Performed by: INTERNAL MEDICINE

## 2018-07-20 PROCEDURE — 99212 OFFICE O/P EST SF 10 MIN: CPT | Mod: 25 | Performed by: INTERNAL MEDICINE

## 2018-07-20 PROCEDURE — 85027 COMPLETE CBC AUTOMATED: CPT | Performed by: INTERNAL MEDICINE

## 2018-07-20 PROCEDURE — 93000 ELECTROCARDIOGRAM COMPLETE: CPT | Performed by: INTERNAL MEDICINE

## 2018-07-20 PROCEDURE — 81003 URINALYSIS AUTO W/O SCOPE: CPT | Performed by: INTERNAL MEDICINE

## 2018-07-20 PROCEDURE — 99207 ZZC RSCC CODE FOR CODING REVIEW: CPT | Mod: 25 | Performed by: INTERNAL MEDICINE

## 2018-07-20 PROCEDURE — 80061 LIPID PANEL: CPT | Performed by: INTERNAL MEDICINE

## 2018-07-20 PROCEDURE — 80053 COMPREHEN METABOLIC PANEL: CPT | Performed by: INTERNAL MEDICINE

## 2018-07-20 PROCEDURE — 36415 COLL VENOUS BLD VENIPUNCTURE: CPT | Performed by: INTERNAL MEDICINE

## 2018-07-20 NOTE — LETTER
[facility]  [address] [facility]  [address]  [place]  [address]  Tel: [phone]    July 24, 2018    Juwan Montoya  [address] [facility] [address]        Dear Mr. Montoya,    I had the opportunity to review your recent labs and a summary of your labs reads as follows:    Your complete blood counts show stable anemia, normal white blood cell count and platelets- would recommend follow up in hematology and would recommend colonoscopy  Your comprehensive metabolic panel showed normal renal function, normal liver function, and normal fasting blood glucose indicating no evidence of diabetes mellitus.  Your fasting lipid panel show  - normal HDL (good) cholesterol -as your goal is greater than 40  - low LDL (bad) cholesterol as your goal is less than 130  - normal triglyceride levels.      Sincerely,    Dipesh Oviedo MD/SMJUDY          Enclosure: Lab Results  Results for orders placed or performed in visit on 07/20/18   CBC with platelets   Result Value Ref Range    WBC 4.6 4.0 - 11.0 10e9/L    RBC Count 3.95 (L) 4.4 - 5.9 10e12/L    Hemoglobin 12.0 (L) 13.3 - 17.7 g/dL    Hematocrit 38.6 (L) 40.0 - 53.0 %    MCV 98 78 - 100 fl    MCH 30.4 26.5 - 33.0 pg    MCHC 31.1 (L) 31.5 - 36.5 g/dL    RDW 14.0 10.0 - 15.0 %    Platelet Count 205 150 - 450 10e9/L   Lipid panel reflex to direct LDL Fasting   Result Value Ref Range    Cholesterol 143 <200 mg/dL    Triglycerides 58 <150 mg/dL    HDL Cholesterol 55 >39 mg/dL    LDL Cholesterol Calculated 76 <100 mg/dL    Non HDL Cholesterol 88 <130 mg/dL   Comprehensive metabolic panel   Result Value Ref Range    Sodium 144 133 - 144 mmol/L    Potassium 4.0 3.4 - 5.3 mmol/L    Chloride 111 (H) 94 - 109 mmol/L    Carbon Dioxide 25 20 - 32 mmol/L    Anion Gap 8 3 - 14 mmol/L    Glucose 91 70 - 99 mg/dL    Urea Nitrogen 19 7 - 30 mg/dL    Creatinine 0.78 0.66 - 1.25 mg/dL    GFR Estimate >90 >60 mL/min/1.7m2    GFR Estimate If Black >90 >60 mL/min/1.7m2     Calcium 8.8 8.5 - 10.1 mg/dL    Bilirubin Total 0.5 0.2 - 1.3 mg/dL    Albumin 4.0 3.4 - 5.0 g/dL    Protein Total 6.9 6.8 - 8.8 g/dL    Alkaline Phosphatase 47 40 - 150 U/L    ALT 24 0 - 70 U/L    AST 17 0 - 45 U/L   UA reflex to Microscopic and Culture   Result Value Ref Range    Color Urine Yellow     Appearance Urine Clear     Glucose Urine Negative NEG^Negative mg/dL    Bilirubin Urine Negative NEG^Negative    Ketones Urine Negative NEG^Negative mg/dL    Specific Gravity Urine >1.030 1.003 - 1.035    Blood Urine Negative NEG^Negative    pH Urine 5.5 5.0 - 7.0 pH    Protein Albumin Urine Negative NEG^Negative mg/dL    Urobilinogen Urine 0.2 0.2 - 1.0 EU/dL    Nitrite Urine Negative NEG^Negative    Leukocyte Esterase Urine Negative NEG^Negative    Source Urine

## 2018-07-20 NOTE — PATIENT INSTRUCTIONS
Preventive Health Recommendations:       Male Ages 65 and over    Yearly exam:             See your health care provider every year in order to  o   Review health changes.   o   Discuss preventive care.    o   Review your medicines if your doctor has prescribed any.    Talk with your health care provider about whether you should have a test to screen for prostate cancer (PSA).    Every 3 years, have a diabetes test (fasting glucose). If you are at risk for diabetes, you should have this test more often.    Every 5 years, have a cholesterol test. Have this test more often if you are at risk for high cholesterol or heart disease.     Every 10 years, have a colonoscopy. Or, have a yearly FIT test (stool test). These exams will check for colon cancer.    Talk to with your health care provider about screening for Abdominal Aortic Aneurysm if you have a family history of AAA or have a history of smoking.  Shots:     Get a flu shot each year.     Get a tetanus shot every 10 years.     Talk to your doctor about your pneumonia vaccines. There are now two you should receive - Pneumovax (PPSV 23) and Prevnar (PCV 13).    Talk to your pharmacist about a shingles vaccine.     Talk to your doctor about the hepatitis B vaccine.  Nutrition:     Eat at least 5 servings of fruits and vegetables each day.     Eat whole-grain bread, whole-wheat pasta and brown rice instead of white grains and rice.     Get adequate Calcium and Vitamin D.   Lifestyle    Exercise for at least 150 minutes a week (30 minutes a day, 5 days a week). This will help you control your weight and prevent disease.     Limit alcohol to one drink per day.     No smoking.     Wear sunscreen to prevent skin cancer.     See your dentist every six months for an exam and cleaning.     See your eye doctor every 1 to 2 years to screen for conditions such as glaucoma, macular degeneration and cataracts.    (Z00.00) Routine general medical examination at a [facility]  facility  (primary encounter diagnosis)  Comment: For routine exam, we will draw labs as ordered, cholesterol, diabetes mellitus check, liver function, renal function, and request a FIT test. A negative FIT test indicates no evidence of colon cancer, but it should be repeated every year to have comparable sensitivity to that of a colonoscopy.  We will also update vaccination history.  Plan: Fecal colorectal cancer screen (FIT)            (K21.9) Gastroesophageal reflux disease without esophagitis  Comment: Continue dietary management   Plan:     (D64.9) Anemia, unspecified type  Comment: stable - no issues  Plan:     (R10.12) Abdominal pain, left upper quadrant  Comment: Recommend Abdominal CT - [facility] phone #[phone]   Plan:     (R42) Dizziness  Comment: Stable - no issues  Plan:     Shingrix vaccine is now available.  I would call your insurance to see if a shingles vaccine is covered and get this at your pharmacy

## 2018-07-20 NOTE — PROGRESS NOTES
SUBJECTIVE:   Juwan Montoya is a 78 year old male who presents for Preventive Visit.    Are you in the first 12 months of your Medicare Part B coverage?  No    Healthy Habits:    Do you get at least three servings of calcium containing foods daily (dairy, green leafy vegetables, etc.)? yes    Amount of exercise or daily activities, outside of work: 0 day(s) per week    Problems taking medications regularly No    Medication side effects: No    Have you had an eye exam in the past two years? yes    Do you see a dentist twice per year? yes    Do you have sleep apnea, excessive snoring or daytime drowsiness?no      Ability to successfully perform activities of daily living: Yes, no assistance needed    Home safety:  lack of grab bars in the bathroom     Hearing impairment: No    Fall risk:  Fallen 2 or more times in the past year?: No  Any fall with injury in the past year?: No    click delete button to remove this line now    COGNITIVE SCREEN  1) Repeat 3 items (Leader, Season, Table)    2) Clock draw: NORMAL  3) 3 item recall: Recalls 2 objects   Results: NORMAL clock, 1-2 items recalled: PROBABLE COGNITIVE IMPAIRMENT, **INFORM PROVIDER**    Mini-CogTM Copyright S Nick. Licensed by the author for use in [facility]; reprinted with permission (jessica@[email or web address]). All rights reserved.        Reviewed and updated as needed this visit by clinical staff         Reviewed and updated as needed this visit by Provider        Social History   Substance Use Topics     Smoking status: Former Smoker     Packs/day: 1.00     Years: 30.00     Smokeless tobacco: Former User     Quit date: 12/6/1961      Comment: 2 packs per year     Alcohol use 0.0 oz/week     0 Standard drinks or equivalent per week      Comment: socially       If you drink alcohol do you typically have >3 drinks per day or >7 drinks per week? No                        Today's PHQ-2 Score:   PHQ-2 ( 1999 Pfizer) 7/13/2017 12/9/2016   Q1: Little  "interest or pleasure in doing things 0 0   Q2: Feeling down, depressed or hopeless 0 0   PHQ-2 Score 0 0       Do you feel safe in your environment - Yes    Do you have a Health Care Directive?: No: Advance care planning reviewed with patient; information given to patient to review.    Current providers sharing in care for this patient include:   Patient Care Team:  Dipesh Oviedo MD as PCP - General (Internal Medicine)  Daniel Solitario MD as PCP - Internal Medicine    The following health maintenance items are reviewed in Epic and correct as of today:  Health Maintenance   Topic Date Due     COLONOSCOPY Q10 YR  01/23/1960     PNEUMOCOCCAL (2 of 2 - PCV13) 01/27/2010     FALL RISK ASSESSMENT  12/09/2017     ADVANCE DIRECTIVE PLANNING Q5 YRS  04/19/2018     PHQ-2 Q1 YR  07/13/2018     INFLUENZA VACCINE (1) 09/01/2018     TETANUS IMMUNIZATION (SYSTEM ASSIGNED)  10/22/2018     LIPID SCREEN Q5 YR MALE (SYSTEM ASSIGNED)  06/27/2021     Dizziness   For the past few weeks he has been getting dizzy.  He is concerned that he may have higher blood pressure at times.    Stress   He has been having some mental stress but feels that he is able to manage this.    Pain in the left side   Has had this pain off and on for the past few years up until yesterday.  Social stress   He has had some difficulty with intimacy.  He has seen a therapist with his wife in the past, but not interested currently    ROS:  Constitutional, HEENT, cardiovascular, pulmonary, GI, , musculoskeletal, neuro, skin, endocrine, except as otherwise noted.    OBJECTIVE:   /76 (BP Location: Right arm, Cuff Size: Adult Regular)  Pulse 73  Temp 97  F (36.1  C) (Oral)  Ht 5' 6.9\" (1.699 m)  Wt 159 lb (72.1 kg)  SpO2 98%  BMI 24.98 kg/m2 Estimated body mass index is 24.98 kg/(m^2) as calculated from the following:    Height as of this encounter: 5' 6.9\" (1.699 m).    Weight as of this encounter: 159 lb (72.1 kg).  EXAM:   GENERAL: " healthy, alert and no distress  EYES: Eyes grossly normal to inspection, PERRL and conjunctivae and sclerae normal  HENT: ear canals and TM's normal, nose and mouth without ulcers or lesions  NECK: no adenopathy, no asymmetry, masses, or scars and thyroid normal to palpation  RESP: lungs clear to auscultation - no rales, rhonchi or wheezes  CV: regular rate and rhythm, normal S1 S2, no S3 or S4, no murmur, click or rub, no peripheral edema and peripheral pulses strong  ABDOMEN: soft, mild left upper quadrant tenderness, no hepatosplenomegaly, no masses and bowel sounds normal  MS: no gross musculoskeletal defects noted, no edema  SKIN: no suspicious lesions or rashes  NEURO: Normal strength and tone, mentation intact and speech normal  PSYCH: mentation appears normal, affect normal/bright    Diagnostic Test Results:  none     ASSESSMENT / PLAN:   (Z00.00) Routine general medical examination at a health care facility  (primary encounter diagnosis)  Comment: For routine exam, we will draw labs as ordered, cholesterol, diabetes mellitus check, liver function, renal function, and request a FIT test. A negative FIT test indicates no evidence of colon cancer, but it should be repeated every year to have comparable sensitivity to that of a colonoscopy.  We will also update vaccination history.  Plan: Fecal colorectal cancer screen (FIT)            (K21.9) Gastroesophageal reflux disease without esophagitis  Comment: Continue dietary management   Plan:     (D64.9) Anemia, unspecified type  Comment: stable - no issues  Plan:     (R10.12) Abdominal pain, left upper quadrant  Comment: Recommend Abdominal CT - [facility] phone #[phone]   Plan:     (R42) Dizziness  Comment: Stable - no issues  Plan:     Shingrix vaccine is now available.  I would call your insurance to see if a shingles vaccine is covered and get this at your pharmacy             End of Life Planning:  Patient currently has an advanced directive:  "Yes.  Practitioner is supportive of decision.    COUNSELING:  Reviewed preventive health counseling, as reflected in patient instructions    BP Readings from Last 1 Encounters:   07/13/17 126/84     Estimated body mass index is 23.63 kg/(m^2) as calculated from the following:    Height as of 7/13/17: 5' 9\" (1.753 m).    Weight as of 7/13/17: 160 lb (72.6 kg).           reports that he has quit smoking. He has a 30.00 pack-year smoking history. He quit smokeless tobacco use about 56 years ago.      Appropriate preventive services were discussed with this patient, including applicable screening as appropriate for cardiovascular disease, diabetes, osteopenia/osteoporosis, and glaucoma.  As appropriate for age/gender, discussed screening for colorectal cancer, prostate cancer, breast cancer, and cervical cancer. Checklist reviewing preventive services available has been given to the patient.    Reviewed patients plan of care and provided an AVS. The Basic Care Plan (routine screening as documented in Health Maintenance) for Juwan meets the Care Plan requirement. This Care Plan has been established and reviewed with the Patient.    Counseling Resources:  ATP IV Guidelines  Pooled Cohorts Equation Calculator  Breast Cancer Risk Calculator  FRAX Risk Assessment  ICSI Preventive Guidelines  Dietary Guidelines for Americans, 2010  [employer]'s MyPlate  ASA Prophylaxis  Lung CA Screening    Dipesh Oviedo MD, MD  [facility]  "

## 2018-07-20 NOTE — MR AVS SNAPSHOT
After Visit Summary   7/20/2018    Juwan Montoya    MRN: [id number]           Patient Information     Date Of Birth          1940        Visit Information        Provider Department      7/20/2018 1:30 PM Dipesh Oviedo MD [facility]        Today's Diagnoses     Routine general medical examination at a health care facility    -  1    Gastroesophageal reflux disease without esophagitis        Anemia, unspecified type        Abdominal pain, left upper quadrant        Dizziness        Encounter for screening for malignant neoplasm of colon           Care Instructions      Preventive Health Recommendations:       Male Ages 65 and over    Yearly exam:             See your health care provider every year in order to  o   Review health changes.   o   Discuss preventive care.    o   Review your medicines if your doctor has prescribed any.    Talk with your health care provider about whether you should have a test to screen for prostate cancer (PSA).    Every 3 years, have a diabetes test (fasting glucose). If you are at risk for diabetes, you should have this test more often.    Every 5 years, have a cholesterol test. Have this test more often if you are at risk for high cholesterol or heart disease.     Every 10 years, have a colonoscopy. Or, have a yearly FIT test (stool test). These exams will check for colon cancer.    Talk to with your health care provider about screening for Abdominal Aortic Aneurysm if you have a family history of AAA or have a history of smoking.  Shots:     Get a flu shot each year.     Get a tetanus shot every 10 years.     Talk to your doctor about your pneumonia vaccines. There are now two you should receive - Pneumovax (PPSV 23) and Prevnar (PCV 13).    Talk to your pharmacist about a shingles vaccine.     Talk to your doctor about the hepatitis B vaccine.  Nutrition:     Eat at least 5 servings of fruits and vegetables each day.     Eat  whole-grain bread, whole-wheat pasta and brown rice instead of white grains and rice.     Get adequate Calcium and Vitamin D.   Lifestyle    Exercise for at least 150 minutes a week (30 minutes a day, 5 days a week). This will help you control your weight and prevent disease.     Limit alcohol to one drink per day.     No smoking.     Wear sunscreen to prevent skin cancer.     See your dentist every six months for an exam and cleaning.     See your eye doctor every 1 to 2 years to screen for conditions such as glaucoma, macular degeneration and cataracts.    (Z00.00) Routine general medical examination at a health care facility  (primary encounter diagnosis)  Comment: For routine exam, we will draw labs as ordered, cholesterol, diabetes mellitus check, liver function, renal function, and request a FIT test. A negative FIT test indicates no evidence of colon cancer, but it should be repeated every year to have comparable sensitivity to that of a colonoscopy.  We will also update vaccination history.  Plan: Fecal colorectal cancer screen (FIT)            (K21.9) Gastroesophageal reflux disease without esophagitis  Comment: Continue dietary management   Plan:     (D64.9) Anemia, unspecified type  Comment: stable - no issues  Plan:     (R10.12) Abdominal pain, left upper quadrant  Comment: Recommend Abdominal CT - [facility] phone #[phone]   Plan:     (R42) Dizziness  Comment: Stable - no issues  Plan:     Shingrix vaccine is now available.  I would call your insurance to see if a shingles vaccine is covered and get this at your pharmacy               Follow-ups after your visit        Future tests that were ordered for you today     Open Future Orders        Priority Expected Expires Ordered    Fecal colorectal cancer screen (FIT) Routine 8/10/2018 10/12/2018 7/20/2018    CT Abdomen Pelvis w Contrast Routine  7/20/2019 7/20/2018            Who to contact     If you have questions or need follow up  "information about today's clinic visit or your schedule please contact [facility] directly at [phone].  Normal or non-critical lab and imaging results will be communicated to you by MyChart, letter or phone within 4 business days after the clinic has received the results. If you do not hear from us within 7 days, please contact the clinic through MyChart or phone. If you have a critical or abnormal lab result, we will notify you by phone as soon as possible.  Submit refill requests through [employer] or call your pharmacy and they will forward the refill request to us. Please allow 3 business days for your refill to be completed.          Additional Information About Your Visit        Care EveryWhere ID     This is your Care EveryWhere ID. This could be used by other organizations to access your [place] medical records  [id number]        Your Vitals Were     Pulse Temperature Height Pulse Oximetry BMI (Body Mass Index)       73 97  F (36.1  C) (Oral) 5' 6.9\" (1.699 m) 98% 24.98 kg/m2        Blood Pressure from Last 3 Encounters:   07/20/18 126/76   07/13/17 126/84   07/11/17 136/75    Weight from Last 3 Encounters:   07/20/18 159 lb (72.1 kg)   07/13/17 160 lb (72.6 kg)   07/08/17 160 lb (72.6 kg)              We Performed the Following     CBC with platelets     Comprehensive metabolic panel     EKG 12-lead complete w/read - Clinics     Lipid panel reflex to direct LDL Fasting     PCV13, IM (6+ WK) - [id number]     UA reflex to Microscopic and Culture        Primary Care Provider Office Phone # Fax #    Dipesh Oviedo [phone] [address]  [facility] [address]        Equal Access to Services     JAZMYN BECKER AH: Hadii lisa Erickson, wathompsonda luqadaha, qaybta kaalmada ce, aric jorgensen. So [facility] [phone].    ATENCIÓN: Si habla español, tiene a cochran disposición servicios gratuitos de asistencia lingüística. Llame al " [phone].    We comply with applicable federal civil rights laws and [place] laws. We do not discriminate on the basis of race, color, national origin, age, disability, sex, sexual orientation, or gender identity.            Thank you!     Thank you for choosing [facility]  for your care. Our goal is always to provide you with excellent care. Hearing back from our patients is one way we can continue to improve our services. Please take a few minutes to complete the written survey that you may receive in the mail after your visit with us. Thank you!             Your Updated Medication List - Protect others around you: Learn how to safely use, store and throw away your medicines at www.disposemymeds.org.          This list is accurate as of 7/20/18  2:02 PM.  Always use your most recent med list.                   Brand Name Dispense Instructions for use Diagnosis    COENZYME Q-10 PO      Take 100 mg by mouth At Bedtime        Lutein 40 MG Caps      Take 40 mg by mouth At Bedtime        priLOSEC OTC 20 MG tablet   Generic drug:  omeprazole     14    1 TABLET EVERY OTHER DAY AT NIGHT    Esophageal reflux       TURMERIC PO      Take 1 capsule by mouth At Bedtime

## 2018-07-20 NOTE — PROGRESS NOTES
"  SUBJECTIVE:   Juwan Montoya is a 78 year old male who presents to clinic today for the following health issues:  {Provider please address medication reconciliation discrepancies--rooming staff please delete if no med/rec issues}    {Superlists:251599}    {additional problems for provider to add:605651}    Problem list and histories reviewed & adjusted, as indicated.  Additional history: {NONE - AS DOCUMENTED:063017::\"as documented\"}    {HIST REVIEW/ LINKS 2:779412}    Reviewed and updated as needed this visit by clinical staff       Reviewed and updated as needed this visit by Provider         {PROVIDER CHARTING PREFERENCE:461562}    "

## 2018-07-21 LAB
ALBUMIN SERPL-MCNC: 4 G/DL (ref 3.4–5)
ALP SERPL-CCNC: 47 U/L (ref 40–150)
ALT SERPL W P-5'-P-CCNC: 24 U/L (ref 0–70)
ANION GAP SERPL CALCULATED.3IONS-SCNC: 8 MMOL/L (ref 3–14)
AST SERPL W P-5'-P-CCNC: 17 U/L (ref 0–45)
BILIRUB SERPL-MCNC: 0.5 MG/DL (ref 0.2–1.3)
BUN SERPL-MCNC: 19 MG/DL (ref 7–30)
CALCIUM SERPL-MCNC: 8.8 MG/DL (ref 8.5–10.1)
CHLORIDE SERPL-SCNC: 111 MMOL/L (ref 94–109)
CHOLEST SERPL-MCNC: 143 MG/DL
CO2 SERPL-SCNC: 25 MMOL/L (ref 20–32)
CREAT SERPL-MCNC: 0.78 MG/DL (ref 0.66–1.25)
GFR SERPL CREATININE-BSD FRML MDRD: >90 ML/MIN/1.7M2
GLUCOSE SERPL-MCNC: 91 MG/DL (ref 70–99)
HDLC SERPL-MCNC: 55 MG/DL
LDLC SERPL CALC-MCNC: 76 MG/DL
NONHDLC SERPL-MCNC: 88 MG/DL
POTASSIUM SERPL-SCNC: 4 MMOL/L (ref 3.4–5.3)
PROT SERPL-MCNC: 6.9 G/DL (ref 6.8–8.8)
SODIUM SERPL-SCNC: 144 MMOL/L (ref 133–144)
TRIGL SERPL-MCNC: 58 MG/DL

## 2018-07-23 ENCOUNTER — TELEPHONE (OUTPATIENT)
Dept: FAMILY MEDICINE | Facility: CLINIC | Age: 78
End: 2018-07-23

## 2018-07-23 DIAGNOSIS — D64.9 ANEMIA, UNSPECIFIED TYPE: Primary | ICD-10-CM

## 2018-07-24 NOTE — TELEPHONE ENCOUNTER
Can we call Juwan Montoya and let him know that     I had the opportunity to review your recent labs and a summary of your labs reads as follows:    Your complete blood counts show stable anemia, normal white blood cell count and platelets- would recommend follow up in hematology and would recommend colonoscopy  Your comprehensive metabolic panel showed normal renal function, normal liver function, and normal fasting blood glucose indicating no evidence of diabetes mellitus.  Your fasting lipid panel show  - normal HDL (good) cholesterol -as your goal is greater than 40  - low LDL (bad) cholesterol as your goal is less than 130  - normal triglyceride levels

## 2018-07-24 NOTE — TELEPHONE ENCOUNTER
Called pt to inform of message below. Pt will contact [facility] for colonoscopy referral.    Aleksey OSHEA RN

## 2018-08-11 ENCOUNTER — HOSPITAL ENCOUNTER (OUTPATIENT)
Dept: CT IMAGING | Facility: CLINIC | Age: 78
Discharge: HOME | End: 2018-08-11
Attending: INTERNAL MEDICINE | Admitting: INTERNAL MEDICINE
Payer: MEDICARE

## 2018-08-11 DIAGNOSIS — R10.12 ABDOMINAL PAIN, LEFT UPPER QUADRANT: ICD-10-CM

## 2018-08-11 PROCEDURE — 25000125 ZZHC RX 250: Performed by: INTERNAL MEDICINE

## 2018-08-11 PROCEDURE — 74177 CT ABD & PELVIS W/CONTRAST: CPT

## 2018-08-11 PROCEDURE — 25000128 H RX IP 250 OP 636: Performed by: INTERNAL MEDICINE

## 2018-08-11 RX ORDER — IOPAMIDOL 755 MG/ML
80 INJECTION, SOLUTION INTRAVASCULAR ONCE
Status: COMPLETED | OUTPATIENT
Start: 2018-08-11 | End: 2018-08-11

## 2018-08-11 RX ADMIN — IOPAMIDOL 80 ML: 755 INJECTION, SOLUTION INTRAVENOUS at 11:09

## 2018-08-11 RX ADMIN — SODIUM CHLORIDE, PRESERVATIVE FREE 64 ML: 5 INJECTION INTRAVENOUS at 11:11

## 2019-01-05 ENCOUNTER — OFFICE VISIT (OUTPATIENT)
Dept: URGENT CARE | Facility: URGENT CARE | Age: 79
End: 2019-01-05
Payer: MEDICARE

## 2019-01-05 VITALS
DIASTOLIC BLOOD PRESSURE: 82 MMHG | SYSTOLIC BLOOD PRESSURE: 162 MMHG | TEMPERATURE: 97.8 F | OXYGEN SATURATION: 99 % | HEART RATE: 76 BPM

## 2019-01-05 DIAGNOSIS — M79.661 PAIN OF RIGHT LOWER LEG: Primary | ICD-10-CM

## 2019-01-05 PROCEDURE — 99213 OFFICE O/P EST LOW 20 MIN: CPT

## 2019-01-05 NOTE — PROGRESS NOTES
SUBJECTIVE:   Juwan Montoya is a 78 year old male who presents to clinic today for the following health issues:    HPI     Hx of ORIF RIGHT ankle 2009-- has had chronic scarring along RIGHT anterior tibia with some erythema just above the RIGHT medial ankle that has been chronic for the past 9 years since the surgery.  Recently has been driving a lot with the holidays- just returned from trip to [place] early this AM- 9 hour car trip.  Here today with wife and son.    States he nicked the area just medial to the scar.  He states it always takes a while to heal.  Area scabs and heals in a few weeks time.  With all the recent driving- patient feels a throbbing in this area that has been shooting zinger pains during day and at night keeping him up.  He has not tried any home remedies to treat the pain.    He had no calf pain or swelling.  He denies hx of DVT or PE.  He has no CP or SOB.    States this area of his RLE has always been sensitive.  He states there have been no new skin changes to area in past weeks.  Wife agrees and states appearance today is unchanged at baseline.    Problem list and histories reviewed & adjusted, as indicated.  Additional history: as documented        Patient Active Problem List   Diagnosis     S/P TURP     Other specified disorder of skin     Esophageal reflux     Ankle pain     CARDIOVASCULAR SCREENING; LDL GOAL LESS THAN 130     Advanced directives, counseling/discussion     Anemia, unspecified type     Multiple rib fractures     Past Surgical History:   Procedure Laterality Date     C ANESTH,REMOVAL OF PROSTATE  9/1994    TURP     SURGICAL HISTORY OF -   7/2008    Cystourethroscopy, with laser ablation of ureteral stone     SURGICAL HISTORY OF -   3/2009    ORIF right ankle fracture     SURGICAL HISTORY OF -   6/2010    Removal of hardware, right ankle       Social History     Tobacco Use     Smoking status: Former Smoker     Packs/day: 1.00     Years: 30.00     Pack years:  30.00     Smokeless tobacco: Former User     Quit date: 12/6/1961     Tobacco comment: 2 packs per year   Substance Use Topics     Alcohol use: Yes     Alcohol/week: 0.0 oz     Comment: socially     Family History   Problem Relation Age of Onset     Family History Negative Father          Current Outpatient Medications   Medication Sig Dispense Refill     COENZYME Q-10 PO Take 100 mg by mouth At Bedtime       PRILOSEC OTC 20 MG OR TBEC 1 TABLET EVERY OTHER DAY AT NIGHT 14 0     TURMERIC PO Take 1 capsule by mouth At Bedtime       Lutein 40 MG CAPS Take 40 mg by mouth At Bedtime       Allergies   Allergen Reactions     No Known Drug Allergies      Recent Labs   Lab Test 07/20/18  1434 07/08/17  1800 06/27/16  1615 10/14/12  1320 02/23/11  1011   LDL 76  --  104*  --  107   HDL 55  --  46  --  54   TRIG 58  --  89  --  49   ALT 24  --  34 68 12   CR 0.78 0.85 0.81 0.85 0.94   GFRESTIMATED >90 87 >90  Non  GFR Calc   89 79   GFRESTBLACK >90 >90   GFR Calc   >90   GFR Calc   >90 >90   POTASSIUM 4.0 3.8 4.7 4.0 4.3      BP Readings from Last 3 Encounters:   01/05/19 162/82   07/20/18 126/76   07/13/17 126/84    Wt Readings from Last 3 Encounters:   07/20/18 72.1 kg (159 lb)   07/13/17 72.6 kg (160 lb)   07/08/17 72.6 kg (160 lb)         ROS:  Constitutional, HEENT, cardiovascular, pulmonary, gi and gu systems are negative, except as otherwise noted.    OBJECTIVE:     /82   Pulse 76   Temp 97.8  F (36.6  C) (Oral)   SpO2 99%   There is no height or weight on file to calculate BMI.  GENERAL: healthy, alert and no distress  EYES: Eyes grossly normal to inspection, PERRL and conjunctivae and sclerae normal  NECK: no adenopathy, no asymmetry, masses, or scars and thyroid normal to palpation  MS: no gross musculoskeletal defects noted, no edema, no calf tenderness or erythema  SKIN: RIGHT anterior tibia with chronic vertical scar veering medially ending a few inches  above RIGHT medial malleolus with small dime-sized area of scabbing with chronic pink skin underlying, no erythema, some mild swelling just distal to this area into foot, no drainage, no skin breakdown  NEURO: Normal strength and tone, mentation intact and speech normal  PSYCH: mentation appears normal, affect normal/bright    ASSESSMENT/PLAN:     1. Pain of right lower leg    Recommend patient try Aleve or ibuprofen with rest and elevation to help with pain patient has been experiencing.  Would recommend compression stockings with long periods of inactivity or with travel to avoid some mild pooling of fluid in this area likely causing the pain due to the stretching of the skin in this chronically sensitive area of his RLE.  He does not feel there is any change from his baseline around this spot--   There does not appear to be any sign of infection in this area.  There is no concern for a DVT at this time.  Close Follow-up if no change or worsening symptoms prn.    CS Urgent Care Provider  [facility]

## 2019-01-05 NOTE — PATIENT INSTRUCTIONS
ALEVE 2 pills in AM and PM with food.  MAXIMUM 4 pills in 24 hours  OR  IBUPROFEN 600-800 mg every 8 hours.  MAXIMUM 2400mg IBUPROFEN in 24 hours    REST/ELEVATE LEG    COMPRESSION STOCKINGS as needed for comfort

## 2019-01-14 ENCOUNTER — ANCILLARY PROCEDURE (OUTPATIENT)
Dept: GENERAL RADIOLOGY | Facility: CLINIC | Age: 79
End: 2019-01-14
Payer: MEDICARE

## 2019-01-14 ENCOUNTER — OFFICE VISIT (OUTPATIENT)
Dept: FAMILY MEDICINE | Facility: CLINIC | Age: 79
End: 2019-01-14
Payer: MEDICARE

## 2019-01-14 VITALS
SYSTOLIC BLOOD PRESSURE: 159 MMHG | HEIGHT: 67 IN | DIASTOLIC BLOOD PRESSURE: 82 MMHG | HEART RATE: 65 BPM | WEIGHT: 162 LBS | OXYGEN SATURATION: 100 % | TEMPERATURE: 96.8 F | BODY MASS INDEX: 25.43 KG/M2

## 2019-01-14 DIAGNOSIS — M25.571 PAIN IN JOINT INVOLVING ANKLE AND FOOT, RIGHT: ICD-10-CM

## 2019-01-14 DIAGNOSIS — M25.571 PAIN IN JOINT INVOLVING ANKLE AND FOOT, RIGHT: Primary | ICD-10-CM

## 2019-01-14 LAB
BASOPHILS # BLD AUTO: 0 10E9/L (ref 0–0.2)
BASOPHILS NFR BLD AUTO: 0.3 %
DIFFERENTIAL METHOD BLD: ABNORMAL
EOSINOPHIL # BLD AUTO: 0.1 10E9/L (ref 0–0.7)
EOSINOPHIL NFR BLD AUTO: 1.8 %
ERYTHROCYTE [DISTWIDTH] IN BLOOD BY AUTOMATED COUNT: 13.9 % (ref 10–15)
ERYTHROCYTE [SEDIMENTATION RATE] IN BLOOD BY WESTERGREN METHOD: 6 MM/H (ref 0–20)
HCT VFR BLD AUTO: 37.6 % (ref 40–53)
HGB BLD-MCNC: 12 G/DL (ref 13.3–17.7)
LYMPHOCYTES # BLD AUTO: 1.1 10E9/L (ref 0.8–5.3)
LYMPHOCYTES NFR BLD AUTO: 18.1 %
MCH RBC QN AUTO: 30.8 PG (ref 26.5–33)
MCHC RBC AUTO-ENTMCNC: 31.9 G/DL (ref 31.5–36.5)
MCV RBC AUTO: 97 FL (ref 78–100)
MONOCYTES # BLD AUTO: 0.5 10E9/L (ref 0–1.3)
MONOCYTES NFR BLD AUTO: 7.7 %
NEUTROPHILS # BLD AUTO: 4.4 10E9/L (ref 1.6–8.3)
NEUTROPHILS NFR BLD AUTO: 72.1 %
PLATELET # BLD AUTO: 256 10E9/L (ref 150–450)
RBC # BLD AUTO: 3.89 10E12/L (ref 4.4–5.9)
WBC # BLD AUTO: 6.1 10E9/L (ref 4–11)

## 2019-01-14 PROCEDURE — 87040 BLOOD CULTURE FOR BACTERIA: CPT | Mod: 91 | Performed by: INTERNAL MEDICINE

## 2019-01-14 PROCEDURE — 85652 RBC SED RATE AUTOMATED: CPT | Performed by: INTERNAL MEDICINE

## 2019-01-14 PROCEDURE — 99214 OFFICE O/P EST MOD 30 MIN: CPT | Performed by: INTERNAL MEDICINE

## 2019-01-14 PROCEDURE — 85025 COMPLETE CBC W/AUTO DIFF WBC: CPT | Performed by: INTERNAL MEDICINE

## 2019-01-14 PROCEDURE — 73610 X-RAY EXAM OF ANKLE: CPT | Mod: RT

## 2019-01-14 PROCEDURE — 36415 COLL VENOUS BLD VENIPUNCTURE: CPT | Performed by: INTERNAL MEDICINE

## 2019-01-14 ASSESSMENT — MIFFLIN-ST. JEOR: SCORE: 1411.87

## 2019-01-14 NOTE — PATIENT INSTRUCTIONS
(M21.302) Acute left ankle pain  (primary encounter diagnosis)  Comment: We will check complete blood counts and blood cultures, ESR and request X-ray today and we will also refer to orthopedics to evaluate and consider surgical exploration.  Given the need for accurate cultures we will hold off on any antibiotics until you can be evaluated by orthopedics.  Discussed with orthopedics and will try to get in as soon as possible.    Plan: CBC with platelets and differential, Blood         culture, Blood culture, ORTHOPEDICS ADULT         REFERRAL

## 2019-01-14 NOTE — PROGRESS NOTES
[facility]  CLINIC PROGRESS NOTE    Subjective:  Skin ulceration   Juwan Montoya was recently seen in urgent care on January 5 for a right shin ulceration.  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  Per notes, he apparently nicked the area of his shin just medial to where his scar was and had been driving for long periods of time (about 9 hours).  With all of his recent driving he started to notice a throbbing in the area as well as some shooting pains in the leg.  There is no evidence of any swelling in the right leg and he had no chest pain or shortness of breath.  Given that he had had a history of chronic periodic painful symptoms at the site it was felt that this recent presentation did not deviate greatly from his baseline and the presence of either cellulitis or deep vein thrombosis was felt to be minimal.  He was discharged home from the urgent care with instructions to continue to use a compression stocking and keep legs elevated.  He was also advised to take ibuprofen as needed for his pain.   He has had some drainage from the site of the ulceration.  He has had some pain in the ankle. He has been able to walk on it, but it is quite painful.  He has not had any fevers.    Upper respiratory infection   Juwan Montoya has had upper respiratory congestion.  He notices that his nasal drainage          Past medical history, medications, allergies, social history, family history reviewed and updated in [facility] as of 1/14/2019 .    ROS  CONSTITUTIONAL: no fatigue, no unexpected change in weight  SKIN: erythema medial to left ankle - no discharge  EYES: no acute vision problems or changes  ENT: no ear problems, no mouth problems, no throat problems  RESP: no significant cough, no shortness of breath  CV: no  "chest pain, no palpitations, no new or worsening peripheral edema  GI: no nausea, no vomiting, no constipation, no diarrhea  MS:left ankle pain as above   PSYCHIATRIC: no changes in mood or affect      Objective:  Vitals  /82 (BP Location: Right arm, Patient Position: Chair, Cuff Size: Adult Regular)   Pulse 65   Temp 96.8  F (36  C) (Tympanic)   Ht 1.699 m (5' 6.9\")   Wt 73.5 kg (162 lb)   SpO2 100%   BMI 25.45 kg/m    GEN: Alert Oriented x3 NAD  HEENT: Atraumatic, normocephalic, neck supple, no thyromegaly, negative cervical adenopathy  CV: RRR no murmurs or rubs  PULM: CTA no wheezes or crackles  ABD: Soft, nontender nondistended, no hepatosplenomegally  SKIN: see image  EXT: 1+ edema right lower extremity  NEURO: Gait and station normal, No focal neurologic deficits  PSYCH: Mood good, affect mood congruent          Results for orders placed or performed in visit on 01/14/19 (from the past 24 hour(s))   CBC with platelets and differential   Result Value Ref Range    WBC 6.1 4.0 - 11.0 10e9/L    RBC Count 3.89 (L) 4.4 - 5.9 10e12/L    Hemoglobin 12.0 (L) 13.3 - 17.7 g/dL    Hematocrit 37.6 (L) 40.0 - 53.0 %    MCV 97 78 - 100 fl    MCH 30.8 26.5 - 33.0 pg    MCHC 31.9 31.5 - 36.5 g/dL    RDW 13.9 10.0 - 15.0 %    Platelet Count 256 150 - 450 10e9/L    % Neutrophils 72.1 %    % Lymphocytes 18.1 %    % Monocytes 7.7 %    % Eosinophils 1.8 %    % Basophils 0.3 %    Absolute Neutrophil 4.4 1.6 - 8.3 10e9/L    Absolute Lymphocytes 1.1 0.8 - 5.3 10e9/L    Absolute Monocytes 0.5 0.0 - 1.3 10e9/L    Absolute Eosinophils 0.1 0.0 - 0.7 10e9/L    Absolute Basophils 0.0 0.0 - 0.2 10e9/L    Diff Method Automated Method    ESR: Erythrocyte sedimentation rate   Result Value Ref Range    Sed Rate 6 0 - 20 mm/h       Assessment/Plan:  Patient Instructions   (M25.572) Acute left ankle pain  (primary encounter diagnosis)  Comment: We will check complete blood counts and blood cultures, ESR and request X-ray today and we " will also refer to orthopedics to evaluate and consider surgical exploration.  Given the need for accurate cultures we will hold off on any antibiotics until you can be evaluated by orthopedics.  Discussed with orthopedics and will try to get in as soon as possible.    Plan: CBC with platelets and differential, Blood         culture, Blood culture, ORTHOPEDICS ADULT         REFERRAL               Follow up in 1 month    Disclaimer: This note consists of symbols derived from keyboarding, dictation and/or voice recognition software. As a result, there may be errors in the script that have gone undetected. Please consider this when interpreting information found in this chart.    Dipesh Oviedo MD  [phone]

## 2019-01-20 LAB
BACTERIA SPEC CULT: NO GROWTH
BACTERIA SPEC CULT: NO GROWTH
Lab: NORMAL
Lab: NORMAL
SPECIMEN SOURCE: NORMAL
SPECIMEN SOURCE: NORMAL

## 2019-01-28 ENCOUNTER — TRANSFERRED RECORDS (OUTPATIENT)
Dept: HEALTH INFORMATION MANAGEMENT | Facility: CLINIC | Age: 79
End: 2019-01-28

## 2019-02-12 ENCOUNTER — OFFICE VISIT (OUTPATIENT)
Dept: PODIATRY | Facility: CLINIC | Age: 79
End: 2019-02-12
Payer: MEDICARE

## 2019-02-12 ENCOUNTER — OFFICE VISIT (OUTPATIENT)
Dept: FAMILY MEDICINE | Facility: CLINIC | Age: 79
End: 2019-02-12
Payer: MEDICARE

## 2019-02-12 VITALS
SYSTOLIC BLOOD PRESSURE: 122 MMHG | WEIGHT: 160 LBS | BODY MASS INDEX: 25.71 KG/M2 | HEART RATE: 80 BPM | DIASTOLIC BLOOD PRESSURE: 73 MMHG | HEIGHT: 66 IN

## 2019-02-12 VITALS
TEMPERATURE: 96.8 F | DIASTOLIC BLOOD PRESSURE: 73 MMHG | BODY MASS INDEX: 25.11 KG/M2 | WEIGHT: 160 LBS | HEART RATE: 80 BPM | SYSTOLIC BLOOD PRESSURE: 122 MMHG | HEIGHT: 67 IN | OXYGEN SATURATION: 98 %

## 2019-02-12 DIAGNOSIS — M25.571 PAIN IN JOINT INVOLVING ANKLE AND FOOT, RIGHT: Primary | ICD-10-CM

## 2019-02-12 DIAGNOSIS — L97.319 VENOUS STASIS ULCER OF ANKLE, RIGHT (H): Primary | ICD-10-CM

## 2019-02-12 DIAGNOSIS — L53.9 ERYTHEMA: ICD-10-CM

## 2019-02-12 DIAGNOSIS — L03.119 CELLULITIS OF ANKLE: ICD-10-CM

## 2019-02-12 DIAGNOSIS — R05.9 COUGHING: ICD-10-CM

## 2019-02-12 DIAGNOSIS — I83.013 VENOUS STASIS ULCER OF ANKLE, RIGHT (H): Primary | ICD-10-CM

## 2019-02-12 LAB
BASOPHILS # BLD AUTO: 0 10E9/L (ref 0–0.2)
BASOPHILS NFR BLD AUTO: 0.6 %
DIFFERENTIAL METHOD BLD: ABNORMAL
EOSINOPHIL # BLD AUTO: 0.1 10E9/L (ref 0–0.7)
EOSINOPHIL NFR BLD AUTO: 2.1 %
ERYTHROCYTE [DISTWIDTH] IN BLOOD BY AUTOMATED COUNT: 14.2 % (ref 10–15)
HCT VFR BLD AUTO: 39.5 % (ref 40–53)
HGB BLD-MCNC: 12.4 G/DL (ref 13.3–17.7)
LYMPHOCYTES # BLD AUTO: 1.2 10E9/L (ref 0.8–5.3)
LYMPHOCYTES NFR BLD AUTO: 23 %
MCH RBC QN AUTO: 30.3 PG (ref 26.5–33)
MCHC RBC AUTO-ENTMCNC: 31.4 G/DL (ref 31.5–36.5)
MCV RBC AUTO: 97 FL (ref 78–100)
MONOCYTES # BLD AUTO: 0.4 10E9/L (ref 0–1.3)
MONOCYTES NFR BLD AUTO: 7.5 %
NEUTROPHILS # BLD AUTO: 3.5 10E9/L (ref 1.6–8.3)
NEUTROPHILS NFR BLD AUTO: 66.8 %
PLATELET # BLD AUTO: 230 10E9/L (ref 150–450)
RBC # BLD AUTO: 4.09 10E12/L (ref 4.4–5.9)
WBC # BLD AUTO: 5.3 10E9/L (ref 4–11)

## 2019-02-12 PROCEDURE — 99204 OFFICE O/P NEW MOD 45 MIN: CPT | Performed by: PODIATRIST

## 2019-02-12 PROCEDURE — 87070 CULTURE OTHR SPECIMN AEROBIC: CPT | Performed by: INTERNAL MEDICINE

## 2019-02-12 PROCEDURE — 87186 SC STD MICRODIL/AGAR DIL: CPT | Performed by: INTERNAL MEDICINE

## 2019-02-12 PROCEDURE — 80053 COMPREHEN METABOLIC PANEL: CPT | Performed by: INTERNAL MEDICINE

## 2019-02-12 PROCEDURE — 85025 COMPLETE CBC W/AUTO DIFF WBC: CPT | Performed by: INTERNAL MEDICINE

## 2019-02-12 PROCEDURE — 99214 OFFICE O/P EST MOD 30 MIN: CPT | Performed by: INTERNAL MEDICINE

## 2019-02-12 PROCEDURE — 87077 CULTURE AEROBIC IDENTIFY: CPT | Performed by: INTERNAL MEDICINE

## 2019-02-12 PROCEDURE — 36415 COLL VENOUS BLD VENIPUNCTURE: CPT | Performed by: INTERNAL MEDICINE

## 2019-02-12 RX ORDER — HYDROCODONE BITARTRATE AND ACETAMINOPHEN 5; 325 MG/1; MG/1
1 TABLET ORAL EVERY 6 HOURS PRN
Qty: 18 TABLET | Refills: 0 | Status: SHIPPED | OUTPATIENT
Start: 2019-02-12 | End: 2019-02-15

## 2019-02-12 RX ORDER — CLINDAMYCIN HCL 300 MG
300 CAPSULE ORAL 3 TIMES DAILY
Qty: 30 CAPSULE | Refills: 0 | Status: SHIPPED | OUTPATIENT
Start: 2019-02-12 | End: 2019-02-22

## 2019-02-12 ASSESSMENT — MIFFLIN-ST. JEOR
SCORE: 1383.51
SCORE: 1397.8

## 2019-02-12 NOTE — LETTER
2/12/2019         RE: Juwan Montoya  [address]  [facility] [address]        Dear Colleague,    Thank you for referring your patient, Juwan Montoya, to the [facility]. Please see a copy of my visit note below.    PATIENT HISTORY:  Juwan Montoya is a 79 year old male who presents to clinic for right ankle nonhealing wound for 2 months.  Started as an abrasion from his other shoe per pt.  3 wks ago pt was treated with abx with some improvement.  Seen at [facility].  Pt seen by Dr. Oviedo today who referred him to me today for this issue.  Pt has not been dressing it regularly.  Uses compression intermittently.  6-8/10 pain.  Nondiabetic.  Dr. Oviedo ordered an ankle MRI and restarted abx today.  Concern for continuing infection.    Review of Systems:  Patient denies fever, chills, rash, stiffness, limping, numbness, weakness, heart burn, blood in stool, chest pain with activity, calf pain when walking, shortness of breath with activity, chronic cough, easy bleeding/bruising, swelling of ankles, excessive thirst, fatigue, depression, anxiety.  Pt reports a wound.     PAST MEDICAL HISTORY:   Past Medical History:   Diagnosis Date     Asymptomatic varicose veins      Bladder stone      Chronic depressive personality disorder      Esophageal reflux      Hepatic cyst      Hypertrophy (benign) of prostate 9/94    turp operation     Nephrolithiasis      S/P TURP      Unspecified essential hypertension         PAST SURGICAL HISTORY:   Past Surgical History:   Procedure Laterality Date     C ANESTH,REMOVAL OF PROSTATE  9/1994    TURP     SURGICAL HISTORY OF -   7/2008    Cystourethroscopy, with laser ablation of ureteral stone     SURGICAL HISTORY OF -   3/2009    ORIF right ankle fracture     SURGICAL HISTORY OF -   6/2010    Removal of hardware, right ankle        MEDICATIONS:   Current Outpatient Medications:      clindamycin (CLEOCIN) 300 MG capsule, Take 1 capsule (300 mg) by  "mouth 3 times daily for 10 days, Disp: 30 capsule, Rfl: 0     COENZYME Q-10 PO, Take 100 mg by mouth At Bedtime, Disp: , Rfl:      HYDROcodone-acetaminophen (NORCO) 5-325 MG tablet, Take 1 tablet by mouth every 6 hours as needed for pain, Disp: 18 tablet, Rfl: 0     Lutein 40 MG CAPS, Take 40 mg by mouth At Bedtime, Disp: , Rfl:      PRILOSEC OTC 20 MG OR TBEC, 1 TABLET EVERY OTHER DAY AT NIGHT, Disp: 14, Rfl: 0     TURMERIC PO, Take 1 capsule by mouth At Bedtime, Disp: , Rfl:      ALLERGIES:    Allergies   Allergen Reactions     No Known Drug Allergies         SOCIAL HISTORY:   Social History     Socioeconomic History     Marital status:      Spouse name: barbara     Number of children: 1     Years of education: high school     Highest education level: Not on file   Social Needs     Financial resource strain: Not on file     Food insecurity - worry: Not on file     Food insecurity - inability: Not on file     Transportation needs - medical: Not on file     Transportation needs - non-medical: Not on file   Occupational History     Not on file   Tobacco Use     Smoking status: Former Smoker     Packs/day: 1.00     Years: 30.00     Pack years: 30.00     Smokeless tobacco: Former User     Quit date: 12/6/1961     Tobacco comment: 2 packs per year   Substance and Sexual Activity     Alcohol use: Yes     Alcohol/week: 0.0 oz     Comment: socially     Drug use: No     Sexual activity: Yes     Partners: Female   Other Topics Concern     Not on file   Social History Narrative    , 1 daughter    Retired post-        FAMILY HISTORY:   Family History   Problem Relation Age of Onset     Family History Negative Father         EXAM:Vitals: /73   Pulse 80   Ht 1.676 m (5' 6\")   Wt 72.6 kg (160 lb)   BMI 25.82 kg/m     BMI= Body mass index is 25.82 kg/m .    General appearance: Patient is alert and fully cooperative with history & exam.  No sign of distress is noted during the visit.   "   Psychiatric: Affect is pleasant & appropriate.  Patient appears motivated to improve health.     Respiratory: Breathing is regular & unlabored while sitting.     HEENT: Hearing is intact to spoken word.  Speech is clear.  No gross evidence of visual impairment that would impact ambulation.     Dermatologic: Approx 5mm diameter stasis appearing ulceration to medial R ankle behind the malleolus.  Local chronic skin discoloration 2/2 venous stasis.  No deep probing.  Mild local erythema, more reactive in appearance.       Vascular: DP & PT pulses are intact & regular on the R.  Mild RLE edema.  Varicosities noted.  CFT and skin temperature are normal to both lower extremities.     Neurologic: Lower extremity sensation is intact to light touch.  No evidence of weakness or contracture in the lower extremities.  No evidence of neuropathy.     Musculoskeletal: Patient is ambulatory without assistive device or brace.  No gross ankle deformity noted.  No foot or ankle joint effusion is noted.    XRs of R ankle from 1/14 reviewed with pt.  S/p ORIF ankle.  No acute findings.    ASSESSMENT:   R ankle nonhealing venous stasis ulceration  Erythema, concern for cellulitis     PLAN:  Reviewed patient's chart in epic.  Discussed condition and treatment options including pros and cons.    Agree with oral abx and MRI today to eval for deep infection.  Discussed risk of bone infection, amputation.  This is less likely.  Advised wound care.    Wound Care Recommendations:    1)  Keep the wound covered by a bandage when bathing.    2)  Gently clean the wound with soap water, separate from bath/shower water.      3)  Each day, apply a topical antibiotic ointment to the wound (Neosporin, Triple antibiotic, Bacitracin).   Cover with large band-aid or gauze.  ACE wrap for edema control.    4)  Please seek immediate medical attention if any increasing redness, swelling, drainage, smell, or pain related to the wound.     Will send to  Vascular/Wound Clinic given the vascular nature of this wound.  F/u with them in 2 wks.  Ok to f/u with me if unable to see Wound Clinic in timely manner.  Pt agrees with plan.       Neo Obando DPM, FACFAS          Again, thank you for allowing me to participate in the care of your patient.        Sincerely,        Neo Obando DPM

## 2019-02-12 NOTE — PATIENT INSTRUCTIONS
Thank you for choosing [facility]!    Follow up with [facility] [place] [ [phone] ] within 2 weeks (or Dr Velasquez if unable to make wound clinic visit).    Wound Care Recommendations:    1)  Keep the wound covered by a bandage when bathing.    2)  Gently clean the wound with soap water, separate from bath/shower water.      3)  Each day, apply a topical antibiotic ointment to the wound (Neosporin, Triple antibiotic, Bacitracin).   Cover with large band-aid or gauze.  ACE wrap for swelling as needed.    4)  Please seek immediate medical attention if any increasing redness, swelling, drainage, smell, or pain related to the wound.         DR. VELASQUEZ'S CLINIC LOCATIONS     MONDAY  [place] TUESDAY & FRIDAY AM  [place]   [address] [place] [address] [address]   [place] [address] [place], [place] [address] [phone]  [phone]       WEDNESDAY  [place] SCHEDULE SURGERY: [phone]   [address] [facility] APPOINTMENTS: [phone]   [place] [address] BILLING QUESTIONS: [phone]   [phone]             Body Mass Index (BMI)  Many things can cause foot and ankle problems. Foot structure, activity level, foot mechanics and injuries are common causes of pain.  One very important issue that often goes unmentioned, is body weight. Extra weight can cause increased stress on muscles, ligaments, bones and tendons.  Sometimes just a few extra pounds is all it takes to put one over her/his threshold. Without reducing that stress, it can be difficult to alleviate pain. Some people are uncomfortable addressing this issue, but we feel it is important for you to think about it. As Foot &  Ankle specialists, our job is addressing the lower extremity problem and possible causes. Regarding extra body weight, we encourage patients to discuss diet and weight management plans with their primary care  doctors. It is this team approach that gives you the best opportunity for pain relief and getting you back on your feet.

## 2019-02-12 NOTE — PATIENT INSTRUCTIONS
(M25.571) Pain in joint involving ankle and foot, right  (primary encounter diagnosis)  Comment: We will check MRI and wound culture today and treat empirically with 10 days of clindamycin.  I would recommend that we refer to speak to a podiatrist today. [facility] phone #[phone]   Plan: CBC with platelets and differential,         Comprehensive metabolic panel, PODIATRY/FOOT &         ANKLE SURGERY REFERRAL, clindamycin (CLEOCIN)         300 MG capsule, Wound Culture Aerobic         Bacterial, MR Ankle Right w/o Contrast            (R05) Coughing  Comment:   Plan:     (L03.119) Cellulitis of ankle  Comment: As above   Plan: CBC with platelets and differential,         Comprehensive metabolic panel, PODIATRY/FOOT &         ANKLE SURGERY REFERRAL, clindamycin (CLEOCIN)         300 MG capsule, Wound Culture Aerobic         Bacterial, MR Ankle Right w/o Contrast

## 2019-02-12 NOTE — LETTER
[facility]  [address] [address] [facility]  [address]  [place]  [address]  Tel: [phone]    February 18, 2019    Juwan Montoya  [address] [facility] [address]        Jarrod Echeverria,     I have had the opportunity to review your recent results and an interpretation is as follows:   Your complete blood counts shows stable anemia   Your wound culture shows Staph Arueus sensitive to clindamycin, but also E. Coli that is not sensitive to clinda - I would recommend  That we add keflex 500 mg four times daily x 10 days    If you have any further questions or problems, please contact our office.      Sincerely,    Dipesh Oviedo MD/palak          Enclosure: Lab Results                              Results for orders placed or performed in visit on 02/12/19   CBC with platelets and differential   Result Value Ref Range    WBC 5.3 4.0 - 11.0 10e9/L    RBC Count 4.09 (L) 4.4 - 5.9 10e12/L    Hemoglobin 12.4 (L) 13.3 - 17.7 g/dL    Hematocrit 39.5 (L) 40.0 - 53.0 %    MCV 97 78 - 100 fl    MCH 30.3 26.5 - 33.0 pg    MCHC 31.4 (L) 31.5 - 36.5 g/dL    RDW 14.2 10.0 - 15.0 %    Platelet Count 230 150 - 450 10e9/L    % Neutrophils 66.8 %    % Lymphocytes 23.0 %    % Monocytes 7.5 %    % Eosinophils 2.1 %    % Basophils 0.6 %    Absolute Neutrophil 3.5 1.6 - 8.3 10e9/L    Absolute Lymphocytes 1.2 0.8 - 5.3 10e9/L    Absolute Monocytes 0.4 0.0 - 1.3 10e9/L    Absolute Eosinophils 0.1 0.0 - 0.7 10e9/L    Absolute Basophils 0.0 0.0 - 0.2 10e9/L    Diff Method Automated Method    Comprehensive metabolic panel   Result Value Ref Range    Sodium 139 133 - 144 mmol/L    Potassium 4.3 3.4 - 5.3 mmol/L    Chloride 109 94 - 109 mmol/L    Carbon Dioxide 27 20 - 32 mmol/L    Anion Gap 3 3 - 14 mmol/L    Glucose 90 70 - 99 mg/dL    Urea Nitrogen 19 7 - 30 mg/dL    Creatinine 0.84 0.66 - 1.25 mg/dL    GFR Estimate 83 >60 mL/min/[1.73_m2]    GFR Estimate If Black >90 >60 mL/min/[1.73_m2]    Calcium 9.3 8.5 - 10.1 mg/dL     Bilirubin Total 0.4 0.2 - 1.3 mg/dL    Albumin 4.3 3.4 - 5.0 g/dL    Protein Total 7.0 6.8 - 8.8 g/dL    Alkaline Phosphatase 48 40 - 150 U/L    ALT 27 0 - 70 U/L    AST 23 0 - 45 U/L   Wound Culture Aerobic Bacterial   Result Value Ref Range    Specimen Description Right Foot Wound     Special Requests Specimen collected in eSwab transport (white cap)     Culture Micro Single colony  Escherichia coli   (A)     Culture Micro Single colony  Staphylococcus aureus   (A)        Susceptibility    Escherichia coli - YUNG     AMIKACIN <=2 Sensitive ug/mL     AMPICILLIN >=32 Resistant ug/mL     AMPICILLIN/SULBACTAM >=32 Resistant ug/mL     CEFEPIME <=1 Sensitive ug/mL     CEFTAZIDIME <=1 Sensitive ug/mL     CEFTRIAXONE <=1 Sensitive ug/mL     CIPROFLOXACIN <=0.25 Sensitive ug/mL     GENTAMICIN <=1 Sensitive ug/mL     LEVOFLOXACIN <=0.12 Sensitive ug/mL     Piperacillin/Tazo <=4 Sensitive ug/mL     TOBRAMYCIN <=1 Sensitive ug/mL     Trimethoprim/Sulfa >=16/304 Resistant ug/mL     MEROPENEM <=0.25 Sensitive ug/mL    Staphylococcus aureus - YUNG     CLINDAMYCIN <=0.25 Sensitive ug/mL     ERYTHROMYCIN 0.5 Sensitive ug/mL     GENTAMICIN <=0.5 Sensitive ug/mL     PENICILLIN >0.5 Resistant ug/mL     TETRACYCLINE <=1.0 Sensitive ug/mL     Trimethoprim/Sulfa <=0.5/9.5 Sensitive ug/mL     VANCOMYCIN <=0.5 Sensitive ug/mL     OXACILLIN  Sensitive ug/mL

## 2019-02-12 NOTE — PROGRESS NOTES
"[facility]  CLINIC PROGRESS NOTE    Subjective:  Infection of left ankle   [place] Montoya returns to the clinic for follow-up of his previous infection.  He was initially evaluated in the clinic on January 14 and noted to have a small area of erythema and a small wound over his left malleolus.  He was referred to orthopedic surgery for evaluation.  He was treated with a course of cephalexin and had a follow-up appointment on January 28 with reported resolution of his symptoms.   He continues to have pain in the ankle, but swelling has improved.  He is walking with a limp.  Pain has been keeping him awake.  He has not had any fevers.      Past medical history, medications, allergies, social history, family history reviewed and updated in [facility] as of 2/12/2019 .    ROS  CONSTITUTIONAL: no fatigue, no unexpected change in weight  SKIN: as above   EYES: no acute vision problems or changes  ENT: no ear problems, no mouth problems, no throat problems  RESP: no significant cough, no shortness of breath  CV: no chest pain, no palpitations, no new or worsening peripheral edema  GI: no nausea, no vomiting, no constipation, no diarrhea  : no frequency, no dysuria, no hematuria  MS: as above   PSYCHIATRIC: no changes in mood or affect      Objective:  Vitals  /73 (BP Location: Right arm, Patient Position: Left side, Cuff Size: Adult Regular)   Pulse 80   Temp 96.8  F (36  C) (Oral)   Ht 1.699 m (5' 6.9\")   Wt 72.6 kg (160 lb)   SpO2 98%   BMI 25.13 kg/m    GEN: Alert Oriented x3 NAD  HEENT: Atraumatic, normocephalic, neck supple,   CV: RRR no murmurs or rubs  PULM: CTA no wheezes or crackles  ABD: Soft, nontender nondistended, no hepatosplenomegally  SKIN: small eraser sized ulceration on right ankle  EXT: 2+ dorsal pedis pulses, no edema bilateral lower extremities  NEURO: Gait and station normal , No focal neurologic deficits  PSYCH: Mood good, affect mood congruent    No images are attached to " the encounter.    Results for orders placed or performed in visit on 02/12/19 (from the past 24 hour(s))   CBC with platelets and differential   Result Value Ref Range    WBC 5.3 4.0 - 11.0 10e9/L    RBC Count 4.09 (L) 4.4 - 5.9 10e12/L    Hemoglobin 12.4 (L) 13.3 - 17.7 g/dL    Hematocrit 39.5 (L) 40.0 - 53.0 %    MCV 97 78 - 100 fl    MCH 30.3 26.5 - 33.0 pg    MCHC 31.4 (L) 31.5 - 36.5 g/dL    RDW 14.2 10.0 - 15.0 %    Platelet Count 230 150 - 450 10e9/L    % Neutrophils 66.8 %    % Lymphocytes 23.0 %    % Monocytes 7.5 %    % Eosinophils 2.1 %    % Basophils 0.6 %    Absolute Neutrophil 3.5 1.6 - 8.3 10e9/L    Absolute Lymphocytes 1.2 0.8 - 5.3 10e9/L    Absolute Monocytes 0.4 0.0 - 1.3 10e9/L    Absolute Eosinophils 0.1 0.0 - 0.7 10e9/L    Absolute Basophils 0.0 0.0 - 0.2 10e9/L    Diff Method Automated Method        Assessment/Plan:  Patient Instructions   (M25.571) Pain in joint involving ankle and foot, right  (primary encounter diagnosis)  Comment: We will check MRI and wound culture today and treat empirically with 10 days of clindamycin.  I would recommend that we refer to speak to a podiatrist today. [facility] phone #[phone]   Plan: CBC with platelets and differential,         Comprehensive metabolic panel, PODIATRY/FOOT &         ANKLE SURGERY REFERRAL, clindamycin (CLEOCIN)         300 MG capsule, Wound Culture Aerobic         Bacterial, MR Ankle Right w/o Contrast            (R05) Coughing  Comment:   Plan:     (L03.119) Cellulitis of ankle  Comment: As above   Plan: CBC with platelets and differential,         Comprehensive metabolic panel, PODIATRY/FOOT &         ANKLE SURGERY REFERRAL, clindamycin (CLEOCIN)         300 MG capsule, Wound Culture Aerobic         Bacterial, MR Ankle Right w/o Contrast               Follow up in 4 weeks    Disclaimer: This note consists of symbols derived from keyboarding, dictation and/or voice recognition software. As a result, there may be errors in  the script that have gone undetected. Please consider this when interpreting information found in this chart.    Dipesh Oviedo MD  [phone]

## 2019-02-12 NOTE — PROGRESS NOTES
PATIENT HISTORY:  Juwan Montoya is a 79 year old male who presents to clinic for right ankle nonhealing wound for 2 months.  Started as an abrasion from his other shoe per pt.  3 wks ago pt was treated with abx with some improvement.  Seen at [facility].  Pt seen by Dr. Oviedo today who referred him to me today for this issue.  Pt has not been dressing it regularly.  Uses compression intermittently.  6-8/10 pain.  Nondiabetic.  Dr. Oviedo ordered an ankle MRI and restarted abx today.  Concern for continuing infection.    Review of Systems:  Patient denies fever, chills, rash, stiffness, limping, numbness, weakness, heart burn, blood in stool, chest pain with activity, calf pain when walking, shortness of breath with activity, chronic cough, easy bleeding/bruising, swelling of ankles, excessive thirst, fatigue, depression, anxiety.  Pt reports a wound.     PAST MEDICAL HISTORY:   Past Medical History:   Diagnosis Date     Asymptomatic varicose veins      Bladder stone      Chronic depressive personality disorder      Esophageal reflux      Hepatic cyst      Hypertrophy (benign) of prostate 9/94    turp operation     Nephrolithiasis      S/P TURP      Unspecified essential hypertension         PAST SURGICAL HISTORY:   Past Surgical History:   Procedure Laterality Date     C ANESTH,REMOVAL OF PROSTATE  9/1994    TURP     SURGICAL HISTORY OF -   7/2008    Cystourethroscopy, with laser ablation of ureteral stone     SURGICAL HISTORY OF -   3/2009    ORIF right ankle fracture     SURGICAL HISTORY OF -   6/2010    Removal of hardware, right ankle        MEDICATIONS:   Current Outpatient Medications:      clindamycin (CLEOCIN) 300 MG capsule, Take 1 capsule (300 mg) by mouth 3 times daily for 10 days, Disp: 30 capsule, Rfl: 0     COENZYME Q-10 PO, Take 100 mg by mouth At Bedtime, Disp: , Rfl:      HYDROcodone-acetaminophen (NORCO) 5-325 MG tablet, Take 1 tablet by mouth every 6 hours as needed for pain, Disp: 18 tablet,  "Rfl: 0     Lutein 40 MG CAPS, Take 40 mg by mouth At Bedtime, Disp: , Rfl:      PRILOSEC OTC 20 MG OR TBEC, 1 TABLET EVERY OTHER DAY AT NIGHT, Disp: 14, Rfl: 0     TURMERIC PO, Take 1 capsule by mouth At Bedtime, Disp: , Rfl:      ALLERGIES:    Allergies   Allergen Reactions     No Known Drug Allergies         SOCIAL HISTORY:   Social History     Socioeconomic History     Marital status:      Spouse name: barbara     Number of children: 1     Years of education: high school     Highest education level: Not on file   Social Needs     Financial resource strain: Not on file     Food insecurity - worry: Not on file     Food insecurity - inability: Not on file     Transportation needs - medical: Not on file     Transportation needs - non-medical: Not on file   Occupational History     Not on file   Tobacco Use     Smoking status: Former Smoker     Packs/day: 1.00     Years: 30.00     Pack years: 30.00     Smokeless tobacco: Former User     Quit date: 12/6/1961     Tobacco comment: 2 packs per year   Substance and Sexual Activity     Alcohol use: Yes     Alcohol/week: 0.0 oz     Comment: socially     Drug use: No     Sexual activity: Yes     Partners: Female   Other Topics Concern     Not on file   Social History Narrative    , 1 daughter    Retired post-        FAMILY HISTORY:   Family History   Problem Relation Age of Onset     Family History Negative Father         EXAM:Vitals: /73   Pulse 80   Ht 1.676 m (5' 6\")   Wt 72.6 kg (160 lb)   BMI 25.82 kg/m    BMI= Body mass index is 25.82 kg/m .    General appearance: Patient is alert and fully cooperative with history & exam.  No sign of distress is noted during the visit.     Psychiatric: Affect is pleasant & appropriate.  Patient appears motivated to improve health.     Respiratory: Breathing is regular & unlabored while sitting.     HEENT: Hearing is intact to spoken word.  Speech is clear.  No gross evidence of visual impairment that " would impact ambulation.     Dermatologic: Approx 5mm diameter stasis appearing ulceration to medial R ankle behind the malleolus.  Local chronic skin discoloration 2/2 venous stasis.  No deep probing.  Mild local erythema, more reactive in appearance.       Vascular: DP & PT pulses are intact & regular on the R.  Mild RLE edema.  Varicosities noted.  CFT and skin temperature are normal to both lower extremities.     Neurologic: Lower extremity sensation is intact to light touch.  No evidence of weakness or contracture in the lower extremities.  No evidence of neuropathy.     Musculoskeletal: Patient is ambulatory without assistive device or brace.  No gross ankle deformity noted.  No foot or ankle joint effusion is noted.    XRs of R ankle from 1/14 reviewed with pt.  S/p ORIF ankle.  No acute findings.    ASSESSMENT:   R ankle nonhealing venous stasis ulceration  Erythema, concern for cellulitis     PLAN:  Reviewed patient's chart in epic.  Discussed condition and treatment options including pros and cons.    Agree with oral abx and MRI today to eval for deep infection.  Discussed risk of bone infection, amputation.  This is less likely.  Advised wound care.    Wound Care Recommendations:    1)  Keep the wound covered by a bandage when bathing.    2)  Gently clean the wound with soap water, separate from bath/shower water.      3)  Each day, apply a topical antibiotic ointment to the wound (Neosporin, Triple antibiotic, Bacitracin).   Cover with large band-aid or gauze.  ACE wrap for edema control.    4)  Please seek immediate medical attention if any increasing redness, swelling, drainage, smell, or pain related to the wound.     Will send to Vascular/Wound Clinic given the vascular nature of this wound.  F/u with them in 2 wks.  Ok to f/u with me if unable to see Wound Clinic in timely manner.  Pt agrees with plan.       Neo Obando, MINDY, FACFAS

## 2019-02-13 LAB
ALBUMIN SERPL-MCNC: 4.3 G/DL (ref 3.4–5)
ALP SERPL-CCNC: 48 U/L (ref 40–150)
ALT SERPL W P-5'-P-CCNC: 27 U/L (ref 0–70)
ANION GAP SERPL CALCULATED.3IONS-SCNC: 3 MMOL/L (ref 3–14)
AST SERPL W P-5'-P-CCNC: 23 U/L (ref 0–45)
BILIRUB SERPL-MCNC: 0.4 MG/DL (ref 0.2–1.3)
BUN SERPL-MCNC: 19 MG/DL (ref 7–30)
CALCIUM SERPL-MCNC: 9.3 MG/DL (ref 8.5–10.1)
CHLORIDE SERPL-SCNC: 109 MMOL/L (ref 94–109)
CO2 SERPL-SCNC: 27 MMOL/L (ref 20–32)
CREAT SERPL-MCNC: 0.84 MG/DL (ref 0.66–1.25)
GFR SERPL CREATININE-BSD FRML MDRD: 83 ML/MIN/{1.73_M2}
GLUCOSE SERPL-MCNC: 90 MG/DL (ref 70–99)
POTASSIUM SERPL-SCNC: 4.3 MMOL/L (ref 3.4–5.3)
PROT SERPL-MCNC: 7 G/DL (ref 6.8–8.8)
SODIUM SERPL-SCNC: 139 MMOL/L (ref 133–144)

## 2019-02-15 ENCOUNTER — HOSPITAL ENCOUNTER (OUTPATIENT)
Dept: MRI IMAGING | Facility: CLINIC | Age: 79
Discharge: HOME | End: 2019-02-15
Attending: INTERNAL MEDICINE | Admitting: INTERNAL MEDICINE
Payer: MEDICARE

## 2019-02-15 DIAGNOSIS — M25.571 PAIN IN JOINT INVOLVING ANKLE AND FOOT, RIGHT: ICD-10-CM

## 2019-02-15 DIAGNOSIS — L03.119 CELLULITIS OF ANKLE: ICD-10-CM

## 2019-02-15 PROCEDURE — 73721 MRI JNT OF LWR EXTRE W/O DYE: CPT | Mod: RT

## 2019-02-16 ENCOUNTER — TELEPHONE (OUTPATIENT)
Dept: FAMILY MEDICINE | Facility: CLINIC | Age: 79
End: 2019-02-16

## 2019-02-16 DIAGNOSIS — L02.619 CELLULITIS AND ABSCESS OF FOOT EXCLUDING TOE: Primary | ICD-10-CM

## 2019-02-16 DIAGNOSIS — L03.119 CELLULITIS AND ABSCESS OF FOOT EXCLUDING TOE: Primary | ICD-10-CM

## 2019-02-16 LAB
BACTERIA SPEC CULT: ABNORMAL
BACTERIA SPEC CULT: ABNORMAL
Lab: ABNORMAL
SPECIMEN SOURCE: ABNORMAL

## 2019-02-16 RX ORDER — CEPHALEXIN 500 MG/1
500 CAPSULE ORAL 4 TIMES DAILY
Qty: 40 CAPSULE | Refills: 0 | Status: SHIPPED | OUTPATIENT
Start: 2019-02-16 | End: 2019-02-26

## 2019-02-16 NOTE — RESULT ENCOUNTER NOTE
Jarrod Ehceverria,    I have had the opportunity to review your recent results and an interpretation is as follows:  Your complete blood counts shows stable anemia  Your wound culture shows Staph Arueus sensitive to clindamycin, but also E. Coli that is not sensitive to clinda - I would recommend  That we add keflex 500 mg four times daily x 10 days    Sincerely,  Dipesh Oviedo MD

## 2019-02-16 NOTE — TELEPHONE ENCOUNTER
I called brianna left a message on his voice mail, but can we call Juwan Montoya again and let him know that   Jarrod Echeverria,    I have had the opportunity to review your recent results and an interpretation is as follows:  Your complete blood counts shows stable anemia  Your wound culture shows Staph Arueus sensitive to clindamycin, but also E. Coli that is not sensitive to clinda - I would recommend  That we add keflex 500 mg four times daily x 10 days    Sincerely,  Dipesh Oviedo MD

## 2019-02-17 ENCOUNTER — TELEPHONE (OUTPATIENT)
Dept: FAMILY MEDICINE | Facility: CLINIC | Age: 79
End: 2019-02-17

## 2019-02-17 NOTE — TELEPHONE ENCOUNTER
Can we call Juwan Montoya and let him know that    Jarrod Echeverria,    I have had the opportunity to review your recent results and an interpretation is as follows:  There is evidence of a previous intra-articular fracture of the ankle and a follow up in the clinic to review this would be recommended - this week    Sincerely,  Dipesh Oviedo MD

## 2019-02-17 NOTE — RESULT ENCOUNTER NOTE
Jarrod Echeverria,    I have had the opportunity to review your recent results and an interpretation is as follows:  There is evidence of a previous intra-articular fracture of the ankle and a follow up in the clinic to review this would be recommended     Sincerely,  Dipesh Oviedo MD

## 2019-02-22 ENCOUNTER — OFFICE VISIT (OUTPATIENT)
Dept: FAMILY MEDICINE | Facility: CLINIC | Age: 79
End: 2019-02-22
Payer: MEDICARE

## 2019-02-22 VITALS
SYSTOLIC BLOOD PRESSURE: 121 MMHG | HEART RATE: 71 BPM | TEMPERATURE: 97.1 F | OXYGEN SATURATION: 99 % | HEIGHT: 66 IN | WEIGHT: 161 LBS | BODY MASS INDEX: 25.88 KG/M2 | DIASTOLIC BLOOD PRESSURE: 73 MMHG

## 2019-02-22 DIAGNOSIS — L03.119 CELLULITIS AND ABSCESS OF FOOT EXCLUDING TOE: ICD-10-CM

## 2019-02-22 DIAGNOSIS — Z12.11 SCREEN FOR COLON CANCER: Primary | ICD-10-CM

## 2019-02-22 DIAGNOSIS — L02.619 CELLULITIS AND ABSCESS OF FOOT EXCLUDING TOE: ICD-10-CM

## 2019-02-22 PROCEDURE — 99213 OFFICE O/P EST LOW 20 MIN: CPT | Performed by: INTERNAL MEDICINE

## 2019-02-22 ASSESSMENT — MIFFLIN-ST. JEOR: SCORE: 1388.04

## 2019-02-22 NOTE — PROGRESS NOTES
"[facility]  CLINIC PROGRESS NOTE    Subjective:  Wound ankle   Sylvan Beachnellie Montoya has an appointment scheduled with vascular/wound clinic on March 4.  His wound has not improved and he is continuing on antibiotics.  He is using topical antibiotics on the wound and also washing with soap and water every other day with gauze bandages.  Pain control has been managed with as needed minimal pain medications - very infrequent hydrocodone.  He is managing fine, but is bothered as the infection seems to be healing yet his pain persists.     Past medical history, medications, allergies, social history, family history reviewed and updated in [facility] as of 2/22/2019 .    ROS  CONSTITUTIONAL: no fatigue, no unexpected change in weight  SKIN: no worrisome rashes, no worrisome moles, no worrisome lesions  EYES: no acute vision problems or changes  ENT: no ear problems, no mouth problems, no throat problems  RESP: no significant cough, no shortness of breath  CV: no chest pain, no palpitations, no new or worsening peripheral edema  GI: no nausea, no vomiting, no constipation, no diarrhea  : no frequency, no dysuria, no hematuria  MS: as above   PSYCHIATRIC: no changes in mood or affect      Objective:  Vitals  /73 (BP Location: Right arm, Cuff Size: Adult Regular)   Pulse 71   Temp 97.1  F (36.2  C) (Tympanic)   Ht 1.676 m (5' 6\")   Wt 73 kg (161 lb)   SpO2 99%   BMI 25.99 kg/m    GEN: Alert Oriented x3 NAD  HEENT: Atraumatic, normocephalic, neck supple,   SKIN: improved redness on medial aspect of left ankle, ulcer also seemingly healing  EXT:  no edema bilateral lower extremities  NEURO: Gait and station deferred, No focal neurologic deficits  PSYCH: Mood good, affect mood congruent    No images are attached to the encounter.    No results found for this or any previous visit (from the past 24 hour(s)).    Assessment/Plan:  Patient Instructions   (Z12.11) Screen for colon cancer  (primary encounter " diagnosis)  Comment: A negative FIT test indicates no evidence of colon cancer, but it should be repeated every year to have comparable sensitivity to that of a colonoscopy.    Plan:     (L03.119,  L02.619) Cellulitis and abscess of foot excluding toe  Comment: Noted cellulitis has improved, but still with pain - recommend follow up consult in vascular surgery as planned.  Also, we will await consult in orthopedics.  For now, no changes in antibiotics   Plan:        Follow up in 2 weeks    Disclaimer: This note consists of symbols derived from keyboarding, dictation and/or voice recognition software. As a result, there may be errors in the script that have gone undetected. Please consider this when interpreting information found in this chart.    Dipesh Oviedo MD  [phone]

## 2019-02-22 NOTE — PATIENT INSTRUCTIONS
(Z12.11) Screen for colon cancer  (primary encounter diagnosis)  Comment: A negative FIT test indicates no evidence of colon cancer, but it should be repeated every year to have comparable sensitivity to that of a colonoscopy.    Plan:     (L03.119,  L02.619) Cellulitis and abscess of foot excluding toe  Comment: Noted cellulitis has improved, but still with pain - recommend follow up consult in vascular surgery as planned.  Also, we will await consult in orthopedics.  For now, no changes in antibiotics   Plan:

## 2019-02-25 ENCOUNTER — TRANSFERRED RECORDS (OUTPATIENT)
Dept: HEALTH INFORMATION MANAGEMENT | Facility: CLINIC | Age: 79
End: 2019-02-25

## 2019-03-04 ENCOUNTER — DOCUMENTATION ONLY (OUTPATIENT)
Dept: OTHER | Facility: CLINIC | Age: 79
End: 2019-03-04

## 2019-03-04 ENCOUNTER — HOSPITAL ENCOUNTER (OUTPATIENT)
Dept: WOUND CARE | Facility: CLINIC | Age: 79
Discharge: HOME | End: 2019-03-04
Attending: SURGERY | Admitting: SURGERY
Payer: MEDICARE

## 2019-03-04 VITALS — TEMPERATURE: 96.5 F | DIASTOLIC BLOOD PRESSURE: 82 MMHG | SYSTOLIC BLOOD PRESSURE: 164 MMHG | HEART RATE: 71 BPM

## 2019-03-04 DIAGNOSIS — I70.233 ATHEROSCLEROSIS OF NATIVE ARTERY OF RIGHT LOWER EXTREMITY WITH ULCERATION OF ANKLE (H): ICD-10-CM

## 2019-03-04 DIAGNOSIS — L97.312 VENOUS STASIS ULCER OF RIGHT ANKLE WITH FAT LAYER EXPOSED, UNSPECIFIED WHETHER VARICOSE VEINS PRESENT (H): ICD-10-CM

## 2019-03-04 DIAGNOSIS — I83.013 VENOUS STASIS ULCER OF RIGHT ANKLE WITH FAT LAYER EXPOSED, UNSPECIFIED WHETHER VARICOSE VEINS PRESENT (H): ICD-10-CM

## 2019-03-04 PROBLEM — L97.319 VENOUS STASIS ULCER OF RIGHT ANKLE (H): Status: ACTIVE | Noted: 2019-03-04

## 2019-03-04 PROCEDURE — 11042 DBRDMT SUBQ TIS 1ST 20SQCM/<: CPT | Performed by: SURGERY

## 2019-03-04 PROCEDURE — A6252 ABSORPT DRG >16 <=48 W/O BDR: HCPCS

## 2019-03-04 PROCEDURE — 99203 OFFICE O/P NEW LOW 30 MIN: CPT | Mod: 25 | Performed by: SURGERY

## 2019-03-04 PROCEDURE — A6454 SELF-ADHER BAND W>=3" <5"/YD: HCPCS

## 2019-03-04 PROCEDURE — 11042 DBRDMT SUBQ TIS 1ST 20SQCM/<: CPT

## 2019-03-04 PROCEDURE — G0463 HOSPITAL OUTPT CLINIC VISIT: HCPCS | Mod: 25

## 2019-03-04 PROCEDURE — A6441 PAD BAND W>=3" <5"/YD: HCPCS

## 2019-03-04 PROCEDURE — 93923 UPR/LXTR ART STDY 3+ LVLS: CPT

## 2019-03-04 PROCEDURE — A6209 FOAM DRSG <=16 SQ IN W/O BDR: HCPCS

## 2019-03-04 NOTE — DISCHARGE INSTRUCTIONS
[facility]  [address] [facility] [address], [place] [address]  Appointment Phone [phone] Nurse Advisors [phone]    Juwan Montoya      1940  Keep leg dry with use of a cast protector (available at [employer] or [employer])  Wound Dressing Change to right medial ankle  Cleanse wound with wound cleanser  Cover wound with Acticoat 7 cut to fit the size of the wound   Followed by foam dressing  Change dressing weekly    Compression:   Your compression wrap is a 2 layer coflex wrap and should stay on until next week.     Please remove compression dressing if toes turn blue and/or tingle and can not be relieved by raising the leg for one hour.     Please raise your legs above your heart for 30 mins 3 times a day to promote wound healing.    q You have been referred to the [facility] office for a venous ultrasound to check the competency of your veins. Please call them at [phone] to schedule an appointment for your ultrasound.      Dylon Floyd M.D.. March 4, 2019    Call us at [phone] if you have any questions about your wounds, have redness or swelling around your wound, have a fever of 101 or greater or if you have any other problems or concerns. We answer the phone Monday through Friday 8 am to 4 pm, please leave a message as we check the voicemail frequently throughout the day.     Follow up with the nursing staff in one week and see Dr. Floyd in 2 weeks

## 2019-03-04 NOTE — PROGRESS NOTES
CWCN time spent educating patient on venous ulcer disease, compression therapy and follow up care. FLYNN WangN, RN-BC, CWCN

## 2019-03-04 NOTE — PROGRESS NOTES
[facility] [place] Progress Note    Subject: Juwan Montoya was referred for a right lateral ankle ulcer that began in January 2019.  Past history of 2009 of the right ankle fracture.  Patient was seen by orthopedics and treated with a course of Keflex.  This did allow temporary healing within the wound broke down again he was seen by Dr. Oviedo who cultured the wound on 2/12/2019.  Treated with a course of Cleocin for E. coli and staph aureus.  Local wound care with antibiotic ointment and Band-Aid.  He was seen by podiatry and referred to the wound clinic.    In 2009 he had ORIF with plating medially and laterally.  The medial plate where the ulcer is has been removed years ago.  MRI was evaluated by his orthopedic surgeon and felt that there is no ostial.  The comments made on the report are due to the old screw holes.    He has had a long history of asymptomatic varicose veins.  No prior history of DVT or venous ulcerations or bleeding.  No treatment for the varicose veins.    Had brownish discoloration over the medial RF site for years.  But also the brownish discoloration at the site of the new ulcer and going distally on the medial ankle on the right side only.  He has reported that he has had several superficial ulcers over the years that always healed.    With this new ulcer he bumped it with the heel of his left shoe which started the new ulcer.  This is very painful when he moves his foot.  However, he is still active and ambulatory.    PMH:  Current Outpatient Medications   Medication     COENZYME Q-10 PO     Lutein 40 MG CAPS     PRILOSEC OTC 20 MG OR TBEC     TURMERIC PO     No current facility-administered medications for this encounter.      Medical: Hypertension                 GERD                  Asymptomatic varicose veins                  Status post TURP  Former smoker.  Lives independently.    Review of systems: Basically noncontributory.  No history of PAD or cardiac  problems.  Lives independently.  Usually very active.    FMH: Mother had significant venous ulcers requiring extensive excision and eventual skin grafting.    Recent laboratory: Serum creatinine= 0.4   Potassium= 4.3                                 Glucose= 90   Hemoglobin= 12.4      Exam:  /82   Pulse 71   Temp 96.5  F (35.8  C) (Tympanic)   Very pleasant alert gentleman.  Normal affect.  Glasses.  HEENT= normal.  No carotid bruits.  No masses.  Chest= clear to auscultation  Cardiovascular= regular rate with no murmur  Abdomen= thin, soft, nontender  Extremities= bilateral calf varicose veins.  +3 dorsalis pedis pulses bilaterally.           Bedside Doppler with normal biphasic to triphasic Dopplers in all arteries.          Ulceration over left medial ankle measuring 0.5 x 0.4 x 0.2 cm with whitish eschar.  Stasis changes are noted surrounding this and extending down to the distal medial ankle.      Procedure:   Patient was determined to be capable of making their own medical decisions and informed consent was obtained. Topical anesthetic of 4% lidocaine was applied was not adequate.  Area prepped with Betadine and 10 mL of 0.5% plain Marcaine injected in a field block fashion with good results.  Debridement was performed using a #15 blade down to and including subcutaneous tissue.  The eschar was completely excised circumferentially to the base with the new diameter being 0.6 x 0.5 x 0.4 cm.  Bleeding controlled with light pressure. Patient tolerated procedure well.    Impression: Right medial ankle ulcer.  No evidence of arterial insufficiency.  Very likely this is a venous insufficiency ulcer with the location and clinical appearance.  Wound is been debrided today and will treat him with Acticoat 7 and a Coflex dynamic compression wrap will be changed weekly.    We do need to get venous evaluation at our [facility] to look at his superficial and deep venous systems including perforators which  likely are incompetent.    Plan: We will dress the wounds with above.  Patient will return to the clinic in 1 weeks time    Spent 30 minutes with the patient today with over 50% in counseling.    Dylon Floyd MD on 3/4/2019 at 9:34 AM

## 2019-03-04 NOTE — PROGRESS NOTES
"Received referral via \"in-basket\", per  guidelines referral forwarded to [facility].    Lizbeth Chatman, FLYNNN, RN    "

## 2019-03-08 ENCOUNTER — OFFICE VISIT (OUTPATIENT)
Dept: VASCULAR SURGERY | Facility: CLINIC | Age: 79
End: 2019-03-08
Attending: SURGERY
Payer: MEDICARE

## 2019-03-08 ENCOUNTER — HOSPITAL ENCOUNTER (OUTPATIENT)
Dept: WOUND CARE | Facility: CLINIC | Age: 79
Discharge: HOME | End: 2019-03-08
Attending: SURGERY | Admitting: SURGERY
Payer: MEDICARE

## 2019-03-08 VITALS — DIASTOLIC BLOOD PRESSURE: 79 MMHG | SYSTOLIC BLOOD PRESSURE: 141 MMHG | HEART RATE: 71 BPM | TEMPERATURE: 97.9 F

## 2019-03-08 DIAGNOSIS — L97.312 VENOUS STASIS ULCER OF RIGHT ANKLE WITH FAT LAYER EXPOSED, UNSPECIFIED WHETHER VARICOSE VEINS PRESENT (H): ICD-10-CM

## 2019-03-08 DIAGNOSIS — I83.013 VENOUS STASIS ULCER OF RIGHT ANKLE WITH FAT LAYER EXPOSED, UNSPECIFIED WHETHER VARICOSE VEINS PRESENT (H): ICD-10-CM

## 2019-03-08 PROCEDURE — 29581 APPL MULTLAYER CMPRN SYS LEG: CPT

## 2019-03-08 PROCEDURE — 93970 EXTREMITY STUDY: CPT | Performed by: SURGERY

## 2019-03-08 PROCEDURE — 97602 WOUND(S) CARE NON-SELECTIVE: CPT

## 2019-03-08 PROCEDURE — A6441 PAD BAND W>=3" <5"/YD: HCPCS

## 2019-03-08 PROCEDURE — A6454 SELF-ADHER BAND W>=3" <5"/YD: HCPCS

## 2019-03-08 NOTE — PROGRESS NOTES
[facility] [place] Nurse Note:    Subject: Juwan Montoya presents for nurse only visit for dressing change to right medial ankle.  Patient states that compression seemed a bit tight and asked if it could be wrapped looser.  He is otherwise doing well.  Writer explained the concept of compression wrap.  His foot was not dusky nor tingling.      Exam:  /79 (BP Location: Left arm)   Pulse 71   Temp 97.9  F (36.6  C) (Temporal)   Wound (used by OP [facility] only) 03/04/19 0926 Right medial ankle ulceration, venous (Active)   Length (cm) 0.3 3/8/2019  2:03 PM   Width (cm) 0.3 3/8/2019  2:03 PM   Depth (cm) 0.2 3/8/2019  2:03 PM   Wound (cm^2) 0.09 cm^2 3/8/2019  2:03 PM   Wound Volume (cm^3) 0.02 cm^3 3/8/2019  2:03 PM   Wound healing % 55 3/8/2019  2:03 PM   Dressing Appearance moist drainage 3/8/2019  2:03 PM   Drainage Characteristics/Odor serosanguineous 3/8/2019  2:03 PM   Drainage Amount moderate 3/8/2019  2:03 PM     Procedure:  4% lidocaine was applied and non-selective debridement was performed using a 4x4 gauze and sterile cotton tipped applicator to remove non-viable tissue less than 20 sq cm, no bleeding occurred. Patient tolerated procedure well.  Wound was redressed with Acticoat, Mepilex and 2 layer compression system.     Plan: Patient will return to the clinic in 1 weeks time  March 8, 2019

## 2019-03-13 NOTE — ADDENDUM NOTE
Encounter addended by: Winter Olivo RN on: 3/13/2019 8:50 AM   Actions taken: Charge Capture section accepted

## 2019-03-15 ENCOUNTER — HOSPITAL ENCOUNTER (OUTPATIENT)
Dept: WOUND CARE | Facility: CLINIC | Age: 79
Discharge: HOME | End: 2019-03-15
Attending: SURGERY | Admitting: SURGERY
Payer: MEDICARE

## 2019-03-15 VITALS
DIASTOLIC BLOOD PRESSURE: 70 MMHG | RESPIRATION RATE: 16 BRPM | HEART RATE: 74 BPM | TEMPERATURE: 97.5 F | SYSTOLIC BLOOD PRESSURE: 129 MMHG

## 2019-03-15 DIAGNOSIS — L97.312 VENOUS STASIS ULCER OF RIGHT ANKLE WITH FAT LAYER EXPOSED, UNSPECIFIED WHETHER VARICOSE VEINS PRESENT (H): ICD-10-CM

## 2019-03-15 DIAGNOSIS — I87.2 VENOUS (PERIPHERAL) INSUFFICIENCY: ICD-10-CM

## 2019-03-15 DIAGNOSIS — I83.013 VENOUS STASIS ULCER OF RIGHT ANKLE WITH FAT LAYER EXPOSED, UNSPECIFIED WHETHER VARICOSE VEINS PRESENT (H): ICD-10-CM

## 2019-03-15 PROCEDURE — 29581 APPL MULTLAYER CMPRN SYS LEG: CPT

## 2019-03-15 PROCEDURE — A6454 SELF-ADHER BAND W>=3" <5"/YD: HCPCS

## 2019-03-15 PROCEDURE — A6441 PAD BAND W>=3" <5"/YD: HCPCS

## 2019-03-15 PROCEDURE — 97597 DBRDMT OPN WND 1ST 20 CM/<: CPT

## 2019-03-15 NOTE — PROGRESS NOTES
[facility] [place] Nurse Note    Subject: Juwan Montoya presents for nurse only visit for dressing change right medial ankle ulcer. Improving with compression wraps.       Exam:  /70 (BP Location: Left arm)   Pulse 74   Temp 97.5  F (36.4  C) (Temporal)   Resp 16   Wound (used by OP [facility] only) 03/04/19 0926 Right medial ankle ulceration, venous (Active)   Length (cm) 0.3 3/15/2019 11:00 AM   Width (cm) 0.2 3/15/2019 11:00 AM   Depth (cm) 0.2 3/15/2019 11:00 AM   Wound (cm^2) 0.06 cm^2 3/15/2019 11:00 AM   Wound Volume (cm^3) 0.01 cm^3 3/15/2019 11:00 AM   Wound healing % 70 3/15/2019 11:00 AM   Dressing Appearance moist drainage 3/15/2019 11:00 AM   Drainage Characteristics/Odor serosanguineous 3/15/2019 11:00 AM   Drainage Amount moderate 3/15/2019 11:00 AM   Thickness/Stage full thickness 3/15/2019 11:31 AM   Base red/granulating 3/15/2019 11:31 AM   Red (%), Wound Tissue Color 100 3/15/2019 11:31 AM   Periwound intact 3/15/2019 11:31 AM   Periwound Temperature warm 3/15/2019 11:31 AM   Periwound Skin Turgor soft 3/15/2019 11:31 AM   Edges open 3/15/2019 11:31 AM   Care, Wound debrided 3/15/2019 11:31 AM     Procedure:  Time out was called, informed consent obtained, and topical anesthetic of 4% lidocaine was applied,selective debridement was performed using a curette to remove non-viable tissue less than 20 sq cm, no bleeding occurred. Patient tolerated procedure well.  Wound was redressed with acticoat 7, mepilex foam, and 2 layer coflex wrap.     Plan: Patient will return to the clinic on Monday for evaluation with Dr. Floyd.  Winter Olivo, FLYNNN, RN, CWOCN  March 15, 2019

## 2019-03-18 ENCOUNTER — TELEPHONE (OUTPATIENT)
Dept: FAMILY MEDICINE | Facility: CLINIC | Age: 79
End: 2019-03-18

## 2019-03-18 ENCOUNTER — HOSPITAL ENCOUNTER (OUTPATIENT)
Dept: WOUND CARE | Facility: CLINIC | Age: 79
Discharge: HOME | End: 2019-03-18
Attending: SURGERY | Admitting: SURGERY
Payer: MEDICARE

## 2019-03-18 VITALS
RESPIRATION RATE: 16 BRPM | SYSTOLIC BLOOD PRESSURE: 153 MMHG | DIASTOLIC BLOOD PRESSURE: 83 MMHG | TEMPERATURE: 96.5 F | HEART RATE: 67 BPM

## 2019-03-18 DIAGNOSIS — L97.312 VENOUS STASIS ULCER OF RIGHT ANKLE WITH FAT LAYER EXPOSED, UNSPECIFIED WHETHER VARICOSE VEINS PRESENT (H): ICD-10-CM

## 2019-03-18 DIAGNOSIS — I83.013 VENOUS STASIS ULCER OF RIGHT ANKLE WITH FAT LAYER EXPOSED, UNSPECIFIED WHETHER VARICOSE VEINS PRESENT (H): ICD-10-CM

## 2019-03-18 PROCEDURE — 97597 DBRDMT OPN WND 1ST 20 CM/<: CPT

## 2019-03-18 PROCEDURE — A6441 PAD BAND W>=3" <5"/YD: HCPCS

## 2019-03-18 PROCEDURE — 11042 DBRDMT SUBQ TIS 1ST 20SQCM/<: CPT

## 2019-03-18 PROCEDURE — A6454 SELF-ADHER BAND W>=3" <5"/YD: HCPCS

## 2019-03-18 PROCEDURE — 29581 APPL MULTLAYER CMPRN SYS LEG: CPT | Mod: 59 | Performed by: SURGERY

## 2019-03-18 PROCEDURE — 97597 DBRDMT OPN WND 1ST 20 CM/<: CPT | Performed by: SURGERY

## 2019-03-18 NOTE — TELEPHONE ENCOUNTER
Reason for Call:  Other call back    Detailed comments: patient was scheduled to see Dr. Oviedo today at 1:30.  Patient came into clinic to cancel this visit.  He is thinking that the reason Dr. Oviedo wants to see him is to check the wound on his foot.  The wound is still cleaned and wrapped at wound clinic. The last time he was in, Dr. Oviedo did not want to unwrap the leg to check the wound.  Patient cancelled his visit for today, but can schedule another visit it he needs to be seen for something else!    If patient is OK to wait, he will come in when he is done with having the wound wrapped and cleaned with the wound clinic.    Phone Number Patient can be reached at: Home number on file [phone] (home)    Best Time: anytime    Can we leave a detailed message on this number? YES    Call taken on 3/18/2019 at 11:23 AM by Shannon Julio  .

## 2019-03-18 NOTE — DISCHARGE INSTRUCTIONS
[facility]   [address] [facility] [address], [place] [address]   Appointment Phone [phone] Nurse Advisors [phone]     Juwannellie Montoya 1940     Wound Dressing Change to right medial ankle   Cleanse wound with wound cleanser   Cover wound with Acticoat 7 cut to fit the size of the wound   Followed by foam dressing   Change dressing weekly     Compression:   Your compression wrap is a 2 layer coflex wrap and should stay on until next week.   Please remove compression dressing if toes turn blue and/or tingle and can not be relieved by raising the leg for one hour.   Please raise your legs above your heart for 30 mins 3 times a day to promote wound healing.     Dylon Floyd M.D. March 18, 2019     Call us at [phone] if you have any questions about your wounds, have redness or swelling around your wound, have a fever of 101 or greater or if you have any other problems or concerns. We answer the phone Monday through Friday 8 am to 4 pm, please leave a message as we check the voicemail frequently throughout the day.     Follow up with the nursing staff weekly for 2 weeks and see Dr. Floyd in 3 weeks

## 2019-03-18 NOTE — PROGRESS NOTES
[facility]ound Healing [place] Progress Note    Subject: Juwan Montoya returns for follow-up of his right medial ankle venous stasis ulcer.  He has been in a dynamic compression wrap using underlying Acticoat 7 with some improvement.  He is very ambulatory and tolerating this weekly dressing change well with continued improvement of the ulcer.    He has excellent arterial blood supply with palpable pulses.  However, he had obvious varicose veins.  We performed a bilateral venous evaluation at the vein [facility] office on 3/8/2019 that was reviewed today.    On his symptomatic right leg there is incompetence of the deep system from the common femoral vein to the popliteal vein.  There is very little we can do about this.  Greater saphenous vein is markedly dilated incompetent from the saphenofemoral junction down to the ankle.  This measures from 7-10 mm in the thigh and 7 the 9 [place] proximal calf and 3.5 mm distally.  There are several varicose veins in the calf region coming off the greater saphenous vein is noted clinically.  There is incompetent  8 cm above the medial malleolus measuring 4.3 mm in diameter going to a varicose vein.  Incompetence of the left greater saphenous vein is also noted but not clinically significant at this time.      Exam:  /83 (BP Location: Left arm)   Pulse 67   Temp 96.5  F (35.8  C) (Temporal)   Resp 16   Alert and appropriate.  Swelling is well controlled with the compression wrap that was removed.  Ulceration is smaller now measuring 0.5 x 0.2 x 0.3 cm.  Surrounding skin is also less macerated and healthier.  Palpable dorsalis pedis pulse.    Procedure:   Patient was determined to be capable of making their own medical decisions and informed consent was obtained. Topical anesthetic of 4% lidocaine was applied, a selective debridement was performed with a #15 blade scalpel removing the slough on the small ulcerated area.  Adequate bleeding is noted.   Bleeding controlled with light pressure. Patient tolerated procedure well.    Leg was redressed with Acticoat 7 over the ulcerated area and a 2 layer dynamic compression wrap.    Impression:.  Slow improvement of venous stasis ulcer.  Continue with weekly dynamic compression wraps.                       Incompetence of the right deep system with no DVT.  Significant competence and dilatation of the entire right greater saphenous vein with multiple varicosities.  He does have the incompetent  but this is well above the ulcer and have less clinical concern.  This does fit in the greater saphenous system.  To help not only heal his ulcer but also prevent recurrence I would recommend that he undergo medically necessary closure of the entire greater saphenous system along with medically necessary stab phlebectomies at our [facility] under the tumescent anesthetic in light oral sedation with Ativan and clonidine.  No need for prophylactic antibiotics.  No specific treatment of the incompetent  since I would suspect this would seal with closure of the greater saphenous system.  I discussed this with the patient and he does concur.  We will obtain approval for this and discuss this further with him on his clinic visits at the wound clinic.    Plan: We will dress the wounds with above.  Patient will return to the clinic in 1 weeks time    Dylon Floyd MD on 3/18/2019 at 11:01 AM

## 2019-03-26 ENCOUNTER — HOSPITAL ENCOUNTER (OUTPATIENT)
Dept: WOUND CARE | Facility: CLINIC | Age: 79
Discharge: HOME | End: 2019-03-26
Attending: SURGERY | Admitting: SURGERY
Payer: MEDICARE

## 2019-03-26 VITALS
DIASTOLIC BLOOD PRESSURE: 81 MMHG | TEMPERATURE: 97.3 F | RESPIRATION RATE: 16 BRPM | HEART RATE: 70 BPM | SYSTOLIC BLOOD PRESSURE: 138 MMHG

## 2019-03-26 DIAGNOSIS — L97.312 VENOUS STASIS ULCER OF RIGHT ANKLE WITH FAT LAYER EXPOSED, UNSPECIFIED WHETHER VARICOSE VEINS PRESENT (H): ICD-10-CM

## 2019-03-26 DIAGNOSIS — I83.013 VENOUS STASIS ULCER OF RIGHT ANKLE WITH FAT LAYER EXPOSED, UNSPECIFIED WHETHER VARICOSE VEINS PRESENT (H): ICD-10-CM

## 2019-03-26 PROCEDURE — A6441 PAD BAND W>=3" <5"/YD: HCPCS

## 2019-03-26 PROCEDURE — A6454 SELF-ADHER BAND W>=3" <5"/YD: HCPCS

## 2019-03-26 PROCEDURE — 97597 DBRDMT OPN WND 1ST 20 CM/<: CPT

## 2019-03-26 PROCEDURE — A6209 FOAM DRSG <=16 SQ IN W/O BDR: HCPCS

## 2019-03-26 NOTE — PROGRESS NOTES
[facility] Wound Healing [place] Nurse Note    Subject: Juwan Montoya presents for nurse only visit for dressing change to right ankle      Exam:  /81 (BP Location: Left arm)   Pulse 70   Temp 97.3  F (36.3  C) (Temporal)   Resp 16   Wound (used by OP [facility] only) 03/04/19 0926 Right medial ankle ulceration, venous (Active)   Length (cm) 0.3 3/26/2019  2:49 PM   Width (cm) 0.2 3/26/2019  2:49 PM   Depth (cm) 0.2 3/26/2019  2:49 PM   Wound (cm^2) 0.06 cm^2 3/26/2019  2:49 PM   Wound Volume (cm^3) 0.01 cm^3 3/26/2019  2:49 PM   Wound healing % 70 3/26/2019  2:49 PM   Dressing Appearance moist drainage 3/26/2019  2:49 PM   Drainage Characteristics/Odor serosanguineous 3/26/2019  2:49 PM   Drainage Amount moderate 3/26/2019  2:49 PM   Thickness/Stage full thickness 3/26/2019  2:49 PM   Base red/granulating 3/26/2019  2:49 PM   Red (%), Wound Tissue Color 100 3/26/2019  2:49 PM   Periwound intact 3/26/2019  2:49 PM   Periwound Temperature warm 3/26/2019  2:49 PM   Periwound Skin Turgor firm 3/26/2019  2:49 PM   Care, Wound non-select wound debridement performed 3/26/2019  2:49 PM     Procedure:  Time out was called, informed consent obtained, and topical anesthetic of 4% lidocaine was applied,selective debridement was performed, no bleeding occurred. Patient tolerated procedure well.  Wound was redressed with Acticoat 7, mepilex foam, and 2 layer coflex wrap.     Plan: Patient will return to the clinic in 1 weeks time  March 26, 2019

## 2019-04-01 ENCOUNTER — HOSPITAL ENCOUNTER (OUTPATIENT)
Dept: WOUND CARE | Facility: CLINIC | Age: 79
Discharge: HOME | End: 2019-04-01
Attending: SURGERY | Admitting: SURGERY
Payer: MEDICARE

## 2019-04-01 VITALS
DIASTOLIC BLOOD PRESSURE: 57 MMHG | TEMPERATURE: 97.3 F | SYSTOLIC BLOOD PRESSURE: 115 MMHG | HEART RATE: 84 BPM | RESPIRATION RATE: 16 BRPM

## 2019-04-01 DIAGNOSIS — I83.013 VENOUS STASIS ULCER OF RIGHT ANKLE WITH FAT LAYER EXPOSED, UNSPECIFIED WHETHER VARICOSE VEINS PRESENT (H): ICD-10-CM

## 2019-04-01 DIAGNOSIS — L97.312 VENOUS STASIS ULCER OF RIGHT ANKLE WITH FAT LAYER EXPOSED, UNSPECIFIED WHETHER VARICOSE VEINS PRESENT (H): ICD-10-CM

## 2019-04-01 PROCEDURE — G0463 HOSPITAL OUTPT CLINIC VISIT: HCPCS

## 2019-04-01 NOTE — PROGRESS NOTES
[facility] Wound He[facility] [place] Nurse Note    Subject: Juwan Montoya presents for nurse only visit for dressing change to right medial ankle.      Exam:  /57 (BP Location: Left arm)   Pulse 84   Temp 97.3  F (36.3  C) (Temporal)   Resp 16      Procedure:  Wound wiped with dry gauze to remove thin film. Wound healed underneath.   Wound was redressed with bandaid to protect from rubbing on Spandagrip D. Patient will wear this or his compression sock daily.     Plan: Patient will return to the clinic in 1 weeks time to see Dr. Floyd and discuss next steps for vein ablation. Tiffany Blackmon, BSN, RN-BC, CWCN  April 1, 2019

## 2019-04-08 ENCOUNTER — HOSPITAL ENCOUNTER (OUTPATIENT)
Dept: WOUND CARE | Facility: CLINIC | Age: 79
Discharge: HOME | End: 2019-04-08
Attending: SURGERY | Admitting: SURGERY
Payer: MEDICARE

## 2019-04-08 VITALS
SYSTOLIC BLOOD PRESSURE: 130 MMHG | TEMPERATURE: 96.7 F | RESPIRATION RATE: 18 BRPM | DIASTOLIC BLOOD PRESSURE: 79 MMHG | HEART RATE: 83 BPM

## 2019-04-08 DIAGNOSIS — Z71.9 HEALTH EDUCATION: ICD-10-CM

## 2019-04-08 PROCEDURE — G0463 HOSPITAL OUTPT CLINIC VISIT: HCPCS

## 2019-04-08 PROCEDURE — 99213 OFFICE O/P EST LOW 20 MIN: CPT | Performed by: SURGERY

## 2019-04-08 NOTE — PROGRESS NOTES
[facility] Wound Healing [place] Progress Note    Subject: Juwan Montoya returns for follow-up of his right medial ankle venous insufficiency ulcer.  We have been using Acticoat 7 and a dynamic compression wrap.  On his last visit on 4/1/2009.  This had healed over.  He is been applying a Band-Aid and a Spandagrip to the site.  He has no specific complaints and the swelling is under better control.    Did undergo a venous exam at our [facility] office revealing incompetence of the right deep system but no DVT.  Entire greater saphenous vein was incompetent with multiple varicosities.  Radial saphenous vein is markedly dilated with diameters between 7 and 10 mm in the thigh and proximal calf and 3.5 mm at the ankle level.  Incompetent perforators noted 8 cm above the ankle and likely not causing his venous ulcer problem.  This does communicate with the greater saphenous system.    He did fracture his ankle and had ORIF with medial and lateral plates.  Medial plate has been removed though he still has one proximal screw in the lateral plate is still intact.  It was not felt that this has any relationship to his ulceration.    Patient has a normal vascular exam with a palpable dorsalis pedis pulse.      Exam:  /79 (BP Location: Left arm)   Pulse 83   Temp 96.7  F (35.9  C) (Temporal)   Resp 18   Alert and appropriate.  Very comfortable.  Medial ankle ulcer still has a depression but is completely epithelialized.  Chronic surrounding skin changes but no signs of infection or swelling.  +3 dorsalis pedis pulse.        Impression: Healed right medial ankle venous ulcer with known deep venous insufficiency and incompetence of the greater saphenous vein and one  as described above.  Patient will require chronic below-knee compression stockings due to his deep venous insufficiency and he does have these at home.  Help decrease the risk of recurrent venous ulcerations and would recommend that he  undergo medically necessary closure the entire right greater saphenous vein and medically necessary stab phlebectomies.  No specific treatment of the incompetent perforators previously described as necessary.  I discussed the operative seizure was performed at our [facility] office under light oral sedation and tumescent anesthetic.  Due to the hardware in his ankle we will give him amoxicillin 2000 mg preprocedure prophylactically.  I discussed the post compression protocol and duplex follow-up with him today and answered all questions.      He does have incompetence of the left greater saphenous system but no history of venous ulcers or other problems  ( he did note a scab over his left anterior lateral ankle recently but does not appear to be related all to any arterial venous issue).  No surgical treatment is indicated on this leg and this is also been discussed.    Plan: We will dress the wounds with a Spandagrip until he is venous surgery on the right leg.  Patient will return to the clinic as needed.  Follow-up in our [facility] office.    Dylon Floyd MD on 4/8/2019 at 10:42 AM

## 2019-04-08 NOTE — PROGRESS NOTES
CWCN time spent educating pt on care of healed wound, compression therapy and follow up care at [facility].

## 2019-05-03 ENCOUNTER — APPOINTMENT (OUTPATIENT)
Dept: VASCULAR SURGERY | Facility: CLINIC | Age: 79
End: 2019-05-03

## 2019-05-03 PROCEDURE — 99207 ZZC VEINSOLUTIONS NO CHARGE VISIT: CPT | Performed by: SURGERY

## 2019-05-22 ENCOUNTER — OFFICE VISIT (OUTPATIENT)
Dept: VASCULAR SURGERY | Facility: CLINIC | Age: 79
End: 2019-05-22
Payer: MEDICARE

## 2019-05-22 ENCOUNTER — APPOINTMENT (OUTPATIENT)
Dept: VASCULAR SURGERY | Facility: CLINIC | Age: 79
End: 2019-05-22
Payer: MEDICARE

## 2019-05-22 DIAGNOSIS — Z53.9 ERRONEOUS ENCOUNTER--DISREGARD: Primary | ICD-10-CM

## 2019-05-22 PROCEDURE — 36475 ENDOVENOUS RF 1ST VEIN: CPT | Mod: RT | Performed by: SURGERY

## 2019-05-22 PROCEDURE — 37765 STAB PHLEB VEINS XTR 10-20: CPT | Mod: RT | Performed by: SURGERY

## 2019-05-22 NOTE — PROGRESS NOTES
[facility]  [facility] Operative Report    Preoperative diagnosis:    1.  Recurrent right ankle venous insufficiency ulcerations.  2.  Incompetent right greater saphenous vein    Post operative diagnosis:  Same    Procedure:  1.  Radiofrequency ablation right greater saphenous vein from saphenofemoral junction to proximal ankle.  2.  Medically necessary stab phlebectomies right thigh-calf-ankle varicose veins    Preoperative medications: 2 mg ativan, 0.1 mg clonidine    Surgeon  Dylon Floyd MD    First assistant  Cheryl Houselog CST/CSFA    Operative description  Indications: 79-year-old patient has recurrent right ankle venous insufficiency ulcers.  He is been treated at her wound clinic with subsequent healing.  He is very compliant with compression.  To help decrease recurrence we felt that venous surgery was indicated.  He has incompetence of the deep system as expected.  Greater saphenous vein is incompetent from the saphenofemoral junction of the ankle with diameters up to 9 mm in the thigh and 6 mm in the calf.  Multiple varicosities are noted in the distal thigh and calf region feeding towards the venous ulcer.  There is an incompetent  into the greater saphenous vein at the mid calf which likely is not related to the recurrent ulcer.  We felt that complete closure of the greater saphenous vein was indicated along with medically necessary stab phlebectomies to help decrease the recurrence of his venous ulcer.  He is aware that he would chronically wear at least a knee-high graduated compression stocking due to his deep venous insufficiency.  Risk benefits discussed with the patient and his wife.  Surface varicosities were marked.    Procedure: Patient was brought to our procedure room.  The entire right leg and groin were prepped and draped.  Timeout was called and sites were identified.    VNUS: Patient was placed in reverse Trendelenburg.  We identified a very large  single channel greater saphenous vein from the saphenofemoral junction down to the mid calf.  Has had several branches primarily in the calf region as noted on the previously marked surface varicosities.  At the distal calf just above the ankle it branched into an anterior and posterior branch but were quite scarred and small.  Under ultrasound guidance I initially tried to access the posterior branch going behind the medial malleolus and just lateral to the ulcer but due to the vein going to spasm we could not pass our guidewire.  We therefore entered the greater saphenous vein just above the ulcerated healed area in the distal one quarter of the calf where it was quite dilated under ultrasound guidance with no difficulty.  Guidewire was passed through the needle with no difficulty.  The this was dilated with a 7 [place] sheath.    Closure fast cath was selected and flushed.  This was passed through the sheath to the saphenofemoral junction with no difficulty.  The catheter was withdrawn and secured at 1.74 cm from the junction.  He was placed in Trendelenburg where the position of the tip of the catheter was confirmed and documented.  Under ultrasound guidance we then injected tumescent solution initially from the knee to the groin and then from the ankle to the knee with good effects.    We then began our treatment sessions.  The very large size of the pain in the thigh each segment required 2 treatments at 120  C for 20 seconds.  Ultrasound pressure was held over the sites with good visualization effect and no discomfort the patient.  At the knee level we treated again the larger pain with 2 segments.  The proximal third of the calf to the distal one third single treatments were all that was necessary with a good impedance being noted.  Again ultrasound pressure was held.  Sheath and catheter removed and pressure was held over the site with good hemostasis with the patient Trendelenburg.  We documented confirmed  patency of the common femoral vein at the saphenofemoral junction upon completion of the treatment.    Stab phlebectomy: We then anesthetized the rest of the marked sites of the calf and anterior ankle region.  Interval micro-ophthalmic blade we made 15 stepladder incisions over the marked sites.  Vein hooks were used to remove these veins with no difficulty in their entirety with very minimal bleeding.    Vaseline ointment was applied over all sites followed by ABD pads cast rolls and Ace bandage from the toes to the thigh.    Lindsay Payne RN this was done under my direct supervision very stable during the entire procedure monitored blood pressure, pulse and pulse oximetry continuously throughout the procedure.    VNUS Procedure:    Pharmacologic agents:   Local anesthesia:   10 mL Lidocaine 1% with Epinephrine + 1 mL Sodium Bicarbonate 8.4%    Total injected volume: 10 mL    Tumescent Anesthesia: 500 ml bag 0.9% Sodium Chloride +60 ml 1% lidocaine with epi 1:100,000 +12 ml 8.4% Sodium Bicarbonate (total volume: 572ml per bag)  Total injected volume: 250 mL    Use of ultrasound guidance:  Yes  Start time: 1235 hrs.  End time: 1342 hrs.  Catheter insertion site: Right distal calf/ankle.  Length of vein treated: 60 cm  Treatment with 16 RF cycles for 5 minutes and 20 seconds.    Total of 18 stab phlebectomy sites were necessary.    Blood loss was less than 5 mL.  Patient recovered her sweets and discharged home with his family with postoperative instructions and follow-up in 72 hours.      Dylon Floyd MD       Dictated 5/22/2019

## 2019-05-24 ENCOUNTER — APPOINTMENT (OUTPATIENT)
Dept: VASCULAR SURGERY | Facility: CLINIC | Age: 79
End: 2019-05-24
Payer: MEDICARE

## 2019-05-24 PROCEDURE — 93971 EXTREMITY STUDY: CPT | Performed by: SURGERY

## 2019-05-24 PROCEDURE — 99207 ZZC VEINSOLUTIONS POST OPERATIVE VISIT: CPT | Performed by: SURGERY

## 2019-07-08 ENCOUNTER — OFFICE VISIT (OUTPATIENT)
Dept: VASCULAR SURGERY | Facility: CLINIC | Age: 79
End: 2019-07-08
Payer: MEDICARE

## 2019-07-08 DIAGNOSIS — Z53.9 ERRONEOUS ENCOUNTER--DISREGARD: Primary | ICD-10-CM

## 2019-07-08 PROCEDURE — 99207 ZZC VEINSOLUTIONS POST OPERATIVE VISIT: CPT | Performed by: SURGERY

## 2019-07-08 NOTE — PROGRESS NOTES
[facility]: Stacia Montoya returns for 6-week follow-up.  This 79-year-old patient had recurrent right medial ankle venous ulceration.  He had known deep venous insufficiency along with a markedly dilated incompetent greater saphenous vein and multiple varicosities particular in the calf.  We felt the closure of the greater saphenous vein and medically necessary stab phlebectomies would be important to help decrease recurrent venous ulceration.    Surgery was performed on 5/22/2019 with no complications.  Follow-up duplex ultrasound on 5/24/2019 revealed closure of the greater saphenous vein 8.8 mm from the junction to the mid calf.    Patient is done very well since that time.  The ulcer is not reopened.  He does wear either knee or thigh-high graduated compression stocking due to his deep venous insufficiency on a daily basis.  He has markedly dilated varicose veins in the left leg but no specific symptoms related to this and thus does not plan any treatment on the left leg.    He does have some chronic numbness of the right medial ankle that occurred even before the surgery related to his venous ulcerations at that site.    Exam: No evidence of any recurrent varicosities in the right leg.             Good distal pulses.  Mild ankle numbness as described             No visible varicosities on the entire right leg.             No distal edema.             Well-healed right medial ankle venous ulcer with some firmness at this site but the rest of the calf and ankle are soft and supple.      Impression: Successful treatment of right leg with closure of the greater saphenous vein and extensive stab phlebectomies.  He does have the underlying deep system insufficiency and thus will need daily either knee or thigh-high gradually compression stockings and he understands this.  Appropriate lotion on the ankle site due to the drying skin from the healed ulcer.  No planned surgery on the left leg unless  symptoms should develop.  Repeat right leg venous duplex ultrasound in 1 year to confirm continued thrombosis of the greater saphenous system and no recurrent varicosities.      Dylon Floyd MD

## 2019-12-23 ENCOUNTER — OFFICE VISIT (OUTPATIENT)
Dept: VASCULAR SURGERY | Facility: CLINIC | Age: 79
End: 2019-12-23
Payer: MEDICARE

## 2019-12-23 VITALS — WEIGHT: 160 LBS | BODY MASS INDEX: 24.25 KG/M2 | RESPIRATION RATE: 20 BRPM | HEIGHT: 68 IN

## 2019-12-23 DIAGNOSIS — I83.812 VARICOSE VEINS OF LEFT LOWER EXTREMITY WITH PAIN: Primary | ICD-10-CM

## 2019-12-23 PROCEDURE — 99214 OFFICE O/P EST MOD 30 MIN: CPT | Performed by: SURGERY

## 2019-12-23 RX ORDER — LIDOCAINE/PRILOCAINE 2.5 %-2.5%
CREAM (GRAM) TOPICAL PRN
Qty: 30 G | Refills: 3 | Status: SHIPPED | OUTPATIENT
Start: 2019-12-23 | End: 2022-02-18

## 2019-12-23 ASSESSMENT — MIFFLIN-ST. JEOR: SCORE: 1415.26

## 2019-12-23 NOTE — PROGRESS NOTES
[place] [facility]    Juwan Montoya Returns for follow-up.  This 79-year-old had recurrent right medial ankle venous ulcers.  He had no deep venous insufficiency along with that incompetent very dilated greater saphenous vein and varicosity in his calf.  He underwent successful closure the entire greater saphenous vein and medically necessary stab phlebectomies on 5/22/2019 with subsequent healing of his ulcers.    He was last seen on 7/8/2019.  At that time his ulcer had remained healed.    His right leg is been fine.  Wears his compression.  The ulcer is remained healed and he has no swelling or pain.    However, approximately 2 months ago he developed an ulceration on his left medial heel on the nonweightbearing portion.  This is been quite painful.  He has been applying some dressings to this.  He tried bacitracin was too painful and had to remove this.  He does wear compression.    We know from his prior ultrasound performed on 3/8/2019 the has incompetence of the left common femoral-proximal and distal superficial femoral and popliteal vein.  Tibial veins appear to be competent.  No incompetent perforators.  Markedly incompetent and dilated greater saphenous vein from the saphenofemoral junction all the way down to the ankle with diameters up to 10.2 mm in the thigh and 6.1 mm in the calf.  The large surface varicosities come off the distal portion of the thigh segment.  The distal lesser saphenous vein is also incompetent with a diameter of 3.9 mm.    Exam: Alert and appropriate.  Here with his wife.             Chest= clear             Cardiovascular= regular rate              +3 dorsalis pedis pulses bilaterally              Right leg looks excellent.  No edema.  Healed ulcer.                Brownish discoloration of the skin from the ulcer site but                   relatively supple             Left leg with significant dilated varicose vein starting the distal medial thigh and  extending down to the ankle.  Medial heel has a tender ulceration measuring 1.5 x 2.5 cm with a depth of 0.1 cm.  No obvious cellulitis.    Impression: Left medial heel ulceration due to his underlying deep venous insufficiency and incompetence of the greater and lesser saphenous veins along with multiple significant varicosities.  No incompetent perforators at this site.  I do feel that we need to treat the underlying surface varicosities and greater and lesser saphenous veins to decrease the venous reflux and allow us to start to treat the ulcer.  I would recommend closure of both the left greater saphenous and lesser veins with extensive medically necessary stab phlebectomies just as we had done on the right side.  This was performed in our office under oral sedation and a tumescent anesthetic.  Risk benefits were reviewed with the patient his wife though he is very aware of this from his prior surgeries.    I do not feel that a repeat venous duplex ultrasound will be of any aid to any my recommendation since that is already is markedly incompetent.    Local care until post venous treatment of the ulcer.  No evidence of infection.  Will try EMLA over the area to see if this can give him pain relief and allow him to sleep better and try to avoid opioids.    Spent over 30 minutes with the patient today with over 50% in counseling.    Dylon Floyd MD

## 2019-12-23 NOTE — LETTER
12/23/2019         RE: Juwan Montoya  [address]  [facility] [id number]        Dear Colleague,    Thank you for referring your patient, Juwan Montoya, to the [facility]. Please see a copy of my visit note below.    [facility]    Juwan Montoya Returns for follow-up.  This 79-year-old had recurrent right medial ankle venous ulcers.  He had no deep venous insufficiency along with that incompetent very dilated greater saphenous vein and varicosity in his calf.  He underwent successful closure the entire greater saphenous vein and medically necessary stab phlebectomies on 5/22/2019 with subsequent healing of his ulcers.    He was last seen on 7/8/2019.  At that time his ulcer had remained healed.    His right leg is been fine.  Wears his compression.  The ulcer is remained healed and he has no swelling or pain.    However, approximately 2 months ago he developed an ulceration on his left medial heel on the nonweightbearing portion.  This is been quite painful.  He has been applying some dressings to this.  He tried bacitracin was too painful and had to remove this.  He does wear compression.    We know from his prior ultrasound performed on 3/8/2019 the has incompetence of the left common femoral-proximal and distal superficial femoral and popliteal vein.  Tibial veins appear to be competent.  No incompetent perforators.  Markedly incompetent and dilated greater saphenous vein from the saphenofemoral junction all the way down to the ankle with diameters up to 10.2 mm in the thigh and 6.1 mm in the calf.  The large surface varicosities come off the distal portion of the thigh segment.  The distal lesser saphenous vein is also incompetent with a diameter of 3.9 mm.    Exam: Alert and appropriate.  Here with his wife.             Chest= clear             Cardiovascular= regular rate              +3 dorsalis pedis pulses bilaterally               Right leg looks excellent.  No edema.  Healed ulcer.                Brownish discoloration of the skin from the ulcer site but                   relatively supple             Left leg with significant dilated varicose vein starting the distal medial thigh and extending down to the ankle.  Medial heel has a tender ulceration measuring 1.5 x 2.5 cm with a depth of 0.1 cm.  No obvious cellulitis.    Impression: Left medial heel ulceration due to his underlying deep venous insufficiency and incompetence of the greater and lesser saphenous veins along with multiple significant varicosities.  No incompetent perforators at this site.  I do feel that we need to treat the underlying surface varicosities and greater and lesser saphenous veins to decrease the venous reflux and allow us to start to treat the ulcer.  I would recommend closure of both the left greater saphenous and lesser veins with extensive medically necessary stab phlebectomies just as we had done on the right side.  This was performed in our office under oral sedation and a tumescent anesthetic.  Risk benefits were reviewed with the patient his wife though he is very aware of this from his prior surgeries.    I do not feel that a repeat venous duplex ultrasound will be of any aid to any my recommendation since that is already is markedly incompetent.    Local care until post venous treatment of the ulcer.  No evidence of infection.  Will try EMLA over the area to see if this can give him pain relief and allow him to sleep better and try to avoid opioids.    Spent over 30 minutes with the patient today with over 50% in counseling.    Dylon Floyd MD       Again, thank you for allowing me to participate in the care of your patient.        Sincerely,        Dylon Floyd MD

## 2019-12-24 DIAGNOSIS — I83.812 VARICOSE VEINS OF LEFT LOWER EXTREMITY WITH PAIN: Primary | ICD-10-CM

## 2019-12-31 ENCOUNTER — ANCILLARY PROCEDURE (OUTPATIENT)
Dept: ULTRASOUND IMAGING | Facility: CLINIC | Age: 79
End: 2019-12-31
Attending: SURGERY
Payer: MEDICARE

## 2019-12-31 DIAGNOSIS — I83.812 VARICOSE VEINS OF LEFT LOWER EXTREMITY WITH PAIN: ICD-10-CM

## 2019-12-31 PROCEDURE — 93971 EXTREMITY STUDY: CPT | Mod: LT | Performed by: SURGERY

## 2020-01-03 ENCOUNTER — TELEPHONE (OUTPATIENT)
Dept: VASCULAR SURGERY | Facility: CLINIC | Age: 80
End: 2020-01-03

## 2020-01-03 DIAGNOSIS — L97.322 SKIN ULCER OF LEFT ANKLE WITH FAT LAYER EXPOSED (H): Primary | ICD-10-CM

## 2020-01-03 NOTE — TELEPHONE ENCOUNTER
Called patient, indicates he is using the lidocaine cream as prescribed, but it's only effective for a couple hours at the most then he has to reapply. Pain is worst at night. Takes advil as needed , effective for a couple hours, but does not want to take it consistently every 2-4 hours.     Patient denies he is seeing anyone else besides Dr. Floyd for wound care. Informed patient Dr. Floyd is not in office until Monday afternoon, will reach out to him them. But for patient to also check with his primary care provider.     During call, patient states Dr. Floyd was going to call him os ultrasound results last week, but patient haven't heard anything. Informed patient that writer will message [facility] regarding this.

## 2020-01-03 NOTE — TELEPHONE ENCOUNTER
[facility] today, [place] stated he is in a lot of pain in left leg,Pt has a left leg heel ulcer. Having a hard time sleeping at night. He has tried elevation,no relief. Pain scale (1-10) during day is 6 on a pain scale and 8 during the night on a pain scale. Pt would like to know if he could have some pain meds to help with his pain until [facility].

## 2020-01-07 RX ORDER — HYDROCODONE BITARTRATE AND ACETAMINOPHEN 5; 325 MG/1; MG/1
1 TABLET ORAL EVERY 6 HOURS PRN
Qty: 25 TABLET | Refills: 0 | Status: SHIPPED | OUTPATIENT
Start: 2020-01-07 | End: 2020-04-27

## 2020-01-07 NOTE — TELEPHONE ENCOUNTER
[facility]    Called pt and told him his recent U/S is unchanged from his previous one per Dr. Floyd and same procedure is planned for him. Also, informed pt that his [place] Rx is ready for him to  at the clinic.    Preoperative Nurse Call    Informed patient: when to take medications (0730), when to check in (0800), to have a /someone that will be responsible for them the rest of the day, to wear loose-fitting comfortable clothing, and to bring their compression hose. Informed patient, that if possible, they should sit in the backseat to elevate their leg on the ride home.    Patient is in agreement with preoperative information and has no further questions.

## 2020-01-15 ENCOUNTER — OFFICE VISIT (OUTPATIENT)
Dept: VASCULAR SURGERY | Facility: CLINIC | Age: 80
End: 2020-01-15
Payer: MEDICARE

## 2020-01-15 VITALS — DIASTOLIC BLOOD PRESSURE: 78 MMHG | SYSTOLIC BLOOD PRESSURE: 138 MMHG | HEART RATE: 87 BPM | OXYGEN SATURATION: 100 %

## 2020-01-15 DIAGNOSIS — I83.812 VARICOSE VEINS OF LEFT LOWER EXTREMITY WITH PAIN: Primary | ICD-10-CM

## 2020-01-15 DIAGNOSIS — L97.322 CHRONIC ULCER OF LEFT ANKLE, WITH FAT LAYER EXPOSED (H): ICD-10-CM

## 2020-01-15 DIAGNOSIS — L03.116 CELLULITIS OF LEFT ANKLE: ICD-10-CM

## 2020-01-15 PROCEDURE — 11042 DBRDMT SUBQ TIS 1ST 20SQCM/<: CPT | Mod: 59 | Performed by: SURGERY

## 2020-01-15 PROCEDURE — 36476 ENDOVENOUS RF VEIN ADD-ON: CPT | Mod: LT | Performed by: SURGERY

## 2020-01-15 PROCEDURE — 36475 ENDOVENOUS RF 1ST VEIN: CPT | Mod: LT | Performed by: SURGERY

## 2020-01-15 PROCEDURE — 37766 PHLEB VEINS - EXTREM 20+: CPT | Mod: LT | Performed by: SURGERY

## 2020-01-15 NOTE — LETTER
1/15/2020         RE: Juwan Montoya  [address]  [facility] [address]        Dear Colleague,    Thank you for referring your patient, Juwan Montoya, to the [facility]. Please see a copy of my visit note below.        Preoperative diagnosis:    1.  Nonhealing left medial ankle ulcer.    2.  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  Radiofrequency ablation of left greater saphenous vein from saphenofemoral junction to ankle   2.  Radiofrequency ablation left lesser saphenous vein  3.  Extensive medically necessary left thigh and calf stab phlebectomy  4.  Full-thickness/subcutaneous excisional debridement left medial ankle ulcer (3.5 x 2.5 x 0.8 cm)    Preoperative medications: 2 mg ativan, 0.1 mg clonidine                                                  Augmentin 2000 mg orally      Cat Closure  Date/Time: 1/15/2020 5:13 PM  Performed by: Dylon Floyd MD  Authorized by: Dylon Floyd MD     Time out: Immediately prior to the procedure a time out was called    Preparation: Patient was prepped and draped in usual sterile fashion    1st Assist:  MIRIAN Palacios  Circulator:  MIRIAN Broussard  Procedure:  VNUS  Procedure side:  Left  Vein Treated:  GSV and SSV  Patient tolerance:  Patient tolerated the procedure well with no immediate complications  Wrap/Hose:  Wraps  Phlebectomy  Date/Time: 1/15/2020 5:14 PM  Performed by: Dylon Floyd MD  Authorized by: Dylon Floyd MD     1st Assist:  MIRIAN Palacios  Circulator:  MIRIAN Broussard  Procedure:  Phlebectomies  Type:  Medically Necessary  Procedure side:  Left  Stabs:  >20  Wrap/Hose:  Wraps        Operative description  Indications: 79-year-old patient has developed a nonhealing ulceration of left medial ankle/heel.  He has extensive  left thigh and calf varicose veins that have been painful and very large.  Incompetence and marked dilatation entire left greater saphenous vein from the saphenofemoral junction to the ankle along with incompetence of the distal one half of the lesser saphenous vein.  Ulceration is failed to heal with conservative treatment is actually increasing in size and now has evidence of cellulitis.  Extremely painful and requiring narcotics for pain control.    We felt that ablation of the incompetent greater and lesser saphenous veins will decrease the venous hypertension in his ankle and aid in healing of the ulcer.  Also removing extensive surface varicosities.  We also plan to debride the ulcer to start more aggressive wound care.    Risk benefits discussed with the patient and his wife and daughter.  Oral antibiotics given preoperatively for prophylaxis.  All surface varicosities marked.    Procedure: Brought to our procedure room.  Entire left leg and groin were prepped and draped.  We isolated the ulcer with gauze and Tegaderm.  Timeout was called the sites identified    VNUS: We placed the patient in reverse Trendelenburg.  Identified the greater saphenous vein from the saphenofemoral junction down to the ankle.  Anesthetized the ankle and under ultrasound guidance entered the very distal ankle greater saphenous vein with a needle followed by a guidewire and a 7 Greek sheath.  Closure fast catheter was flushed and passed up the sheath to the saphenofemoral junction.  Tip was positioned 2.0 cm from the junction of the and secured.  Placed in Trendelenburg with the tip of the catheter was rechecked and documented.  Placed in Trendelenburg.  Under ultrasound guidance tumescent solution was injected from the knee to the groin and then from the ankle to the knee under ultrasound guidance.  Minimal 1 cm between the catheter and the dermis was obtained.  We then began our treatments.  The first 7 cm required 3 treatment  sessions with ultrasound pressure held over the area good impedance noted and good visualization effect.  Due to the very large size of the greater saphenous vein all segments down to the ankle required 2 treatment sessions with good impedance being noted and no discomfort.  Sheath and catheter removed pressure held of the ankle.  Documented patency of the common femoral vein at the saphenofemoral junction.    Stab phlebectomy: We then anesthetized the markedly dilated varicosities on the distal thigh-patellar region proximal and mid medial and lateral calf.  With a micro-, blade we performed a total of 70 stepladder incisions over the dilated varicose veins.  These were removed with a vein hook with good results being noted.  Mild amount of bleeding appreciated stopped with pressure.    VNUS CLOSURE LSV: Patient then placed supine.  Again in reverse Trendelenburg we identified the lesser saphenous vein from the ankle up to the popliteal region.  We entered after injecting the lidocaine solution the very distal lateral ankle lesser saphenous vein with a needle followed by a guidewire and a 7 [place] sheath.  Closure fast catheter was passed but went to the mid calf where would go no further.  We identified a sharp bend within the lesser saphenous vein we accessed the lesser saphenous vein in a similar fashion at the mid calf guidewire in place.  Placed in Trendelenburg.  Tumescent solution injected from the ankle to the mid calf.  For 7 cm were treated with 2 sessions and the rest with a single session at 101  C for 20 seconds with good impedance being noted.  Sheath and catheter removed.    With the patient still in Trendelenburg we accessed the wire in the mid calf with the sheath.  Closure fast catheter was passed up to the popliteal crease.  We released 2 cm from the popliteal vein at this level.  Tumescent solution again injected.  We treated three 7 cm segments with 2 sessions each in the final segment with a  single session with good impedance being noted no discomfort.  Sheath and cath removed and pressure was held.      Ulcer debridement: Placed again supine.  We wrapped the leg with Vaseline ointment ABD pads and cast rolls to isolate this from the ankle ulcer.  Tegaderm and gauze removed.  Using a tumescent solution of injected circumferentially around the ulcer.  Using a 15 blade scalpel full-thickness/subcutaneous excisional debridement was performed excising the ulcer down to healthier skin edges and down to the base which was still quite firm.  All of infected nonviable tissue was excised.  Upon completion of the excisional debridement this measured 3.5 cm x 2.5 cm x 0.8 cm.  Bleeding was controlled with simple pressure.  We used an Aquacel Ag soaked in our tumescent solution to place this in the ulcer followed by ABD pads cast rolls.  Ace bandage from the toes to the groin applied.    EBL: 40 mL    Flowsheet Data 1/15/2020   Procedure Start Time: 18716   Prep: Chloraprep;Other - Use Comments   Side: Left   Tx Length (cm): LT GSV 75   Junction (cm): LT GSV 2.0   RF Cycles: LT GSV 23   RF TX Time (Minutes): LT GSV 7:40   How many additional vein treatments are being performed? 2   Tx Length (cm) - 2: LT Distal SSV 15   Junction 2 (cm): LT Distal SSV 0   RF Cycles 2 : LT Distal SSV 3   RF TX Time (Minutes) 2: LT Distal SSV 1:00   Tx Length (cm)- 3: LT Proximal SSV 18   Junction 3 (cm): LT Proximal SSV 0   RF Cycles 3: LT Proximal SSV 5   RF TX Time (Minutes) 3: LT Proximal SSV 1:40   # PHLEB Sites: 70   Sedation taken: Yes   Pre Pt. Physical / Cognitive Limitations: WNL   TOTAL Local anesthesia Injected (ml): 11   Max Volume Local Anesthesia (ml): 11   Max Volume Tumescent (ml): 572   Post Pt. Physical / Cognitive Limitations: WNL   Procedure End Time: 06994   D/C Instructions given, states readiness to leave and escorted to car: Yes       Patient's blood pressure, pulse and pulse oximetry were continuously  monitored throughout the procedure under my direct supervision and was stable during the procedure.    Patient recovered in our suites and discharged home with their family with postoperative instructions and follow up.     Due to the cellulitis in the ankle he will be started on Septra DS 1 p.o. twice daily for a total of 2 weeks.  He will see us in 72 hours for follow-up duplex ultrasound and dressing change at the ankle.  He will establish care at our wound center for this chronic ulcer.  Also given 5 mg oxycodone due to the severe pain in the ulcer preventing him from sleeping a total of #40 tablets to be used sparingly.    Dylon Floyd MD        Again, thank you for allowing me to participate in the care of your patient.        Sincerely,        Dylon Floyd MD

## 2020-01-15 NOTE — PROGRESS NOTES
Preoperative diagnosis:    1.  Nonhealing left medial ankle ulcer.    2.  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  Radiofrequency ablation of left greater saphenous vein from saphenofemoral junction to ankle   2.  Radiofrequency ablation left lesser saphenous vein  3.  Extensive medically necessary left thigh and calf stab phlebectomy  4.  Full-thickness/subcutaneous excisional debridement left medial ankle ulcer (3.5 x 2.5 x 0.8 cm)    Preoperative medications: 2 mg ativan, 0.1 mg clonidine                                                  Augmentin 2000 mg orally      Cat Closure  Date/Time: 1/15/2020 5:13 PM  Performed by: Dylon Floyd MD  Authorized by: Dylon Floyd MD     Time out: Immediately prior to the procedure a time out was called    Preparation: Patient was prepped and draped in usual sterile fashion    1st Assist:  MIRIAN Palacios  Circulator:  MIRIAN Broussard  Procedure:  VNUS  Procedure side:  Left  Vein Treated:  GSV and SSV  Patient tolerance:  Patient tolerated the procedure well with no immediate complications  Wrap/Hose:  Wraps  Phlebectomy  Date/Time: 1/15/2020 5:14 PM  Performed by: Dylon Floyd MD  Authorized by: Dylon Floyd MD     1st Assist:  MIRIAN Palacios  Circulator:  MIRIAN Broussard  Procedure:  Phlebectomies  Type:  Medically Necessary  Procedure side:  Left  Stabs:  >20  Wrap/Hose:  Wraps        Operative description  Indications: 79-year-old patient has developed a nonhealing ulceration of left medial ankle/heel.  He has extensive left thigh and calf varicose veins that have been painful and very large.  Incompetence and marked dilatation entire left greater saphenous vein from the saphenofemoral junction to the ankle along with incompetence of the distal one half of the lesser saphenous vein.   Ulceration is failed to heal with conservative treatment is actually increasing in size and now has evidence of cellulitis.  Extremely painful and requiring narcotics for pain control.    We felt that ablation of the incompetent greater and lesser saphenous veins will decrease the venous hypertension in his ankle and aid in healing of the ulcer.  Also removing extensive surface varicosities.  We also plan to debride the ulcer to start more aggressive wound care.    Risk benefits discussed with the patient and his wife and daughter.  Oral antibiotics given preoperatively for prophylaxis.  All surface varicosities marked.    Procedure: Brought to our procedure room.  Entire left leg and groin were prepped and draped.  We isolated the ulcer with gauze and Tegaderm.  Timeout was called the sites identified    VNUS: We placed the patient in reverse Trendelenburg.  Identified the greater saphenous vein from the saphenofemoral junction down to the ankle.  Anesthetized the ankle and under ultrasound guidance entered the very distal ankle greater saphenous vein with a needle followed by a guidewire and a 7 [place] sheath.  Closure fast catheter was flushed and passed up the sheath to the saphenofemoral junction.  Tip was positioned 2.0 cm from the junction of the and secured.  Placed in Trendelenburg with the tip of the catheter was rechecked and documented.  Placed in Trendelenburg.  Under ultrasound guidance tumescent solution was injected from the knee to the groin and then from the ankle to the knee under ultrasound guidance.  Minimal 1 cm between the catheter and the dermis was obtained.  We then began our treatments.  The first 7 cm required 3 treatment sessions with ultrasound pressure held over the area good impedance noted and good visualization effect.  Due to the very large size of the greater saphenous vein all segments down to the ankle required 2 treatment sessions with good impedance being noted and no  discomfort.  Sheath and catheter removed pressure held of the ankle.  Documented patency of the common femoral vein at the saphenofemoral junction.    Stab phlebectomy: We then anesthetized the markedly dilated varicosities on the distal thigh-patellar region proximal and mid medial and lateral calf.  With a micro-, blade we performed a total of 70 stepladder incisions over the dilated varicose veins.  These were removed with a vein hook with good results being noted.  Mild amount of bleeding appreciated stopped with pressure.    VNUS CLOSURE LSV: Patient then placed supine.  Again in reverse Trendelenburg we identified the lesser saphenous vein from the ankle up to the popliteal region.  We entered after injecting the lidocaine solution the very distal lateral ankle lesser saphenous vein with a needle followed by a guidewire and a 7 Khmer sheath.  Closure fast catheter was passed but went to the mid calf where would go no further.  We identified a sharp bend within the lesser saphenous vein we accessed the lesser saphenous vein in a similar fashion at the mid calf guidewire in place.  Placed in Trendelenburg.  Tumescent solution injected from the ankle to the mid calf.  For 7 cm were treated with 2 sessions and the rest with a single session at 101  C for 20 seconds with good impedance being noted.  Sheath and catheter removed.    With the patient still in Trendelenburg we accessed the wire in the mid calf with the sheath.  Closure fast catheter was passed up to the popliteal crease.  We released 2 cm from the popliteal vein at this level.  Tumescent solution again injected.  We treated three 7 cm segments with 2 sessions each in the final segment with a single session with good impedance being noted no discomfort.  Sheath and cath removed and pressure was held.      Ulcer debridement: Placed again supine.  We wrapped the leg with Vaseline ointment ABD pads and cast rolls to isolate this from the ankle ulcer.   Tegaderm and gauze removed.  Using a tumescent solution of injected circumferentially around the ulcer.  Using a 15 blade scalpel full-thickness/subcutaneous excisional debridement was performed excising the ulcer down to healthier skin edges and down to the base which was still quite firm.  All of infected nonviable tissue was excised.  Upon completion of the excisional debridement this measured 3.5 cm x 2.5 cm x 0.8 cm.  Bleeding was controlled with simple pressure.  We used an Aquacel Ag soaked in our tumescent solution to place this in the ulcer followed by ABD pads cast rolls.  Ace bandage from the toes to the groin applied.    EBL: 40 mL    Flowsheet Data 1/15/2020   Procedure Start Time: 23071   Prep: Chloraprep;Other - Use Comments   Side: Left   Tx Length (cm): LT GSV 75   Junction (cm): LT GSV 2.0   RF Cycles: LT GSV 23   RF TX Time (Minutes): LT GSV 7:40   How many additional vein treatments are being performed? 2   Tx Length (cm) - 2: LT Distal SSV 15   Junction 2 (cm): LT Distal SSV 0   RF Cycles 2 : LT Distal SSV 3   RF TX Time (Minutes) 2: LT Distal SSV 1:00   Tx Length (cm)- 3: LT Proximal SSV 18   Junction 3 (cm): LT Proximal SSV 0   RF Cycles 3: LT Proximal SSV 5   RF TX Time (Minutes) 3: LT Proximal SSV 1:40   # PHLEB Sites: 70   Sedation taken: Yes   Pre Pt. Physical / Cognitive Limitations: WNL   TOTAL Local anesthesia Injected (ml): 11   Max Volume Local Anesthesia (ml): 11   Max Volume Tumescent (ml): 572   Post Pt. Physical / Cognitive Limitations: WNL   Procedure End Time: 11840   D/C Instructions given, states readiness to leave and escorted to car: Yes       Patient's blood pressure, pulse and pulse oximetry were continuously monitored throughout the procedure under my direct supervision and was stable during the procedure.    Patient recovered in our suites and discharged home with their family with postoperative instructions and follow up.     Due to the cellulitis in the ankle he will  be started on Septra DS 1 p.o. twice daily for a total of 2 weeks.  He will see us in 72 hours for follow-up duplex ultrasound and dressing change at the ankle.  He will establish care at our wound center for this chronic ulcer.  Also given 5 mg oxycodone due to the severe pain in the ulcer preventing him from sleeping a total of #40 tablets to be used sparingly.    Dylon Floyd MD

## 2020-01-15 NOTE — NURSING NOTE
Pre-procedure Nursing Note    Juwan Montoya presents to clinic for Vein Procedure  .   /Person Responsible for Patient: Gemini Negro  Phone Number: [phone]    Prophylactic Medication:Amoxicillin 500mg  Sedation Medication: Ativan, 1 mg ,   Time Taken: 1:00pm and Clonidine, 0.1mg,   Time Taken: 1:00pm  Compression Stockings: Hose at home  The procedure is being performed on LLE.  Patient understanding of procedure matches consent? YES        An Morales MA

## 2020-01-17 ENCOUNTER — ANCILLARY PROCEDURE (OUTPATIENT)
Dept: ULTRASOUND IMAGING | Facility: CLINIC | Age: 80
End: 2020-01-17
Attending: SURGERY
Payer: MEDICARE

## 2020-01-17 ENCOUNTER — OFFICE VISIT (OUTPATIENT)
Dept: VASCULAR SURGERY | Facility: CLINIC | Age: 80
End: 2020-01-17
Attending: SURGERY
Payer: MEDICARE

## 2020-01-17 DIAGNOSIS — I83.812 VARICOSE VEINS OF LEFT LOWER EXTREMITY WITH PAIN: ICD-10-CM

## 2020-01-17 DIAGNOSIS — Z09 POSTOP CHECK: Primary | ICD-10-CM

## 2020-01-17 PROCEDURE — 93971 EXTREMITY STUDY: CPT | Mod: LT | Performed by: SURGERY

## 2020-01-17 PROCEDURE — 99207 ZZC NO CHARGE NURSE ONLY: CPT

## 2020-01-17 NOTE — PROGRESS NOTES
[facility]    Postoperative Note / Nurse Visit    Patient is here for their 72 hour postoperative visit. See postoperative flowsheet.  Ultrasound result: The left GSV is closed 36.7mm from SFJ to distal calf. The left SSV is closed 18.4mm from SPJ to distal calf.  Questions or concerns: Instructed pt and pt's wife and friend on how to wrap his leg with an ace wrap and how to dress his ankle wound. Pt's leg was cleaned with soap and water and his wound was cleaned with Vashe then Aquacel AG soaked in 1% Lidocaine was placed on the wound bed. Then wrapped with cast padding and ace wrap as pt's phlebectomy incisions were still oozing.     Reviewed postoperative instructions with patient and provided them with written material of common things to expect from their procedure. Patient's next [facility] appointment:6 week postop with Dr. Floyd. Pt to see  at wound clinic on Monday 1/27.    Aleida Paris RN

## 2020-01-17 NOTE — LETTER
1/17/2020         RE: Juwan Montoya  [address]  [facility] [address]        Dear Colleague,    Thank you for referring your patient, Juwan Montoya, to the [facility]. Please see a copy of my visit note below.    [facility]    Postoperative Note / Nurse Visit    Patient is here for their 72 hour postoperative visit. See postoperative flowsheet.  Ultrasound result: The left GSV is closed 36.7mm from SFJ to distal calf. The left SSV is closed 18.4mm from SPJ to distal calf.  Questions or concerns: Instructed pt and pt's wife and friend on how to wrap his leg with an ace wrap and how to dress his ankle wound. Pt's leg was cleaned with soap and water and his wound was cleaned with Vashe then Aquacel AG soaked in 1% Lidocaine was placed on the wound bed. Then wrapped with cast padding and ace wrap as pt's phlebectomy incisions were still oozing.     Reviewed postoperative instructions with patient and provided them with written material of common things to expect from their procedure. Patient's next [facility] appointment:6 week postop with Dr. Floyd. Pt to see  at wound clinic on Monday 1/27.    Again, thank you for allowing me to participate in the care of your patient.        Sincerely,         Vein Nurse

## 2020-01-23 ENCOUNTER — OFFICE VISIT (OUTPATIENT)
Dept: VASCULAR SURGERY | Facility: CLINIC | Age: 80
End: 2020-01-23
Payer: MEDICARE

## 2020-01-23 DIAGNOSIS — Z09 POSTOP CHECK: Primary | ICD-10-CM

## 2020-01-23 PROCEDURE — 99207 ZZC NO CHARGE NURSE ONLY: CPT

## 2020-01-23 NOTE — NURSING NOTE
[facility]    Triage Nurse Visit     Situation: Pt stopped into clinic and had some questions for the nurse.    Pt had his left leg ace wrapped from his knee down to his ankle and then his ankle wound was wrapped with gauze.    Background: Pt had vein procedure: Left leg VNUS closure GSV,SSV and 2 unit phlebs (med nec) on 1/15/20 with Dr. Floyd.    Assessment: Moderate amount of bruising on his left leg as to be expected from his procedure on 1/15/20. LLE < 3 sec capillary refill, good pedal pulses. Pt noted on the lateral side of his ankle some bruising that he states is sore/tender and is concerned that it will become a wound eventually as that is how his current wound looked when it first developed.    Recommendation: Rewrapped pt's ankle wound after cleaning it with Vashe, placing Aquacel AG on it then gauze 4x4 then wrapped his foot with cast padding for extra protection and then ace wrapped from his foot up to his knee. Instructed pt to wrap his foot as well otherwise his foot will be swollen which he noted when he stopped in.    Pt has an appointment with  on Monday 1/27 at the [facility] Clinic. Informed pt to take his oxycodone tablet 1 hour prior to this appt to help with the discomfort if  debrides the wound. Instructed pt to talk to Dr. Floyd on Monday regarding his concerns with how painful it is when the wound is debrided.    Pt is in agreement with plan and had no further questions.    Aleida Paris RN

## 2020-01-27 ENCOUNTER — HOSPITAL ENCOUNTER (OUTPATIENT)
Dept: WOUND CARE | Facility: CLINIC | Age: 80
Discharge: HOME | End: 2020-01-27
Attending: SURGERY | Admitting: SURGERY
Payer: MEDICARE

## 2020-01-27 VITALS
HEART RATE: 81 BPM | TEMPERATURE: 97 F | RESPIRATION RATE: 16 BRPM | DIASTOLIC BLOOD PRESSURE: 87 MMHG | SYSTOLIC BLOOD PRESSURE: 149 MMHG

## 2020-01-27 DIAGNOSIS — L97.321 VENOUS STASIS ULCER OF LEFT ANKLE LIMITED TO BREAKDOWN OF SKIN WITH VARICOSE VEINS (H): ICD-10-CM

## 2020-01-27 DIAGNOSIS — I83.023 VENOUS STASIS ULCER OF LEFT ANKLE LIMITED TO BREAKDOWN OF SKIN WITH VARICOSE VEINS (H): ICD-10-CM

## 2020-01-27 PROBLEM — L97.329: Status: ACTIVE | Noted: 2019-03-04

## 2020-01-27 PROCEDURE — A6021 COLLAGEN DRESSING <=16 SQ IN: HCPCS

## 2020-01-27 PROCEDURE — 11042 DBRDMT SUBQ TIS 1ST 20SQCM/<: CPT

## 2020-01-27 PROCEDURE — 11042 DBRDMT SUBQ TIS 1ST 20SQCM/<: CPT | Performed by: SURGERY

## 2020-01-27 RX ORDER — SULFAMETHOXAZOLE/TRIMETHOPRIM 800-160 MG
TABLET ORAL
COMMUNITY
Start: 2020-01-15 | End: 2020-04-27

## 2020-01-27 RX ORDER — OXYCODONE HYDROCHLORIDE 5 MG/1
TABLET ORAL
COMMUNITY
Start: 2020-01-15 | End: 2020-04-27

## 2020-01-27 NOTE — DISCHARGE INSTRUCTIONS
[facility]  [address] [address]    Call us at [phone] if you have any questions about your wounds, have redness or swelling around your wound, have a fever of 101 or greater or if you have any other problems or concerns. We answer the phone Monday through Friday 8 am to 4 pm, please leave a message as we check the voicemail frequently throughout the day.     Juwan Sethdionte Montoya      1940    Left medial ankle venous ulcer wound care recommendations  Clean the wound in the shower with soap and water, rinse well then apply Endoform Antimicrobial to wound bed followed by Spandage, gauze then Spandagrip. Do daily    Our office will order the medical supplies from [employer] to be delivered to your home.   Remove compression dressing if toes turn blue and/or start to feel numb and is not relieved by elevating the leg for one hour.   Please raise your legs above your heart for 30 mins 3 times a day to promote wound healing.     Dylon Floyd M.D.. January 27, 2020    Appointments for you to make:  Wound Healing Clinic  Follow up with Dr. Floyd in 2 weeks

## 2020-01-27 NOTE — PROGRESS NOTES
[facility] Wound Healing [place] Progress Note    Subject: Juwan Montoya is a history of venous ulcers.  He been treated in the past successfully on his right leg at the wound clinic.  However he developed an ulceration venous over the left medial heel.  He had significant underlying venous insufficiency as the likely etiology.  Ulcer was nonhealing and he underwent radiofrequency ablation of left greater saphenous vein from the saphenofemoral junction of the ankle along with the lesser saphenous vein and extensive medically necessary stab phlebectomies on the thigh and calf varicosities.  We also debrided the ulcer that measured at that time 3.5 x 2.5 x 0.8 cm at the time of the procedure on 1/15/2020.    Patient was using Aquacell AG over the ulcer that still remains quite painful.  However, this is improving.  He has been changing the dressings daily.    At the time of the surgery his ulceration was infected.  Has been on Bactrim DS twice daily with several days left.      Also continues with compression ACE wraps since the procedure.  Does have oxycodone that we prescribed for this.      PMH:   Past Medical History:   Diagnosis Date     Asymptomatic varicose veins      Bladder stone      Chronic depressive personality disorder      Esophageal reflux      Hepatic cyst      Hypertrophy (benign) of prostate 9/94    turp operation     Nephrolithiasis      S/P TURP      Unspecified essential hypertension      Patient Active Problem List   Diagnosis     S/P TURP     Other specified disorder of skin     Esophageal reflux     Ankle pain     CARDIOVASCULAR SCREENING; LDL GOAL LESS THAN 130     Advanced directives, counseling/discussion     Anemia, unspecified type     Multiple rib fractures     Pain in joint involving ankle and foot, right     Venous stasis ulcer of right ankle (H)     Social Hx:   Social History     Socioeconomic History     Marital status:      Spouse name: barbara     Jillian of  children: 1     Years of education: high school     Highest education level: Not on file   Occupational History     Not on file   Social Needs     Financial resource strain: Not on file     Food insecurity:     Worry: Not on file     Inability: Not on file     Transportation needs:     Medical: Not on file     Non-medical: Not on file   Tobacco Use     Smoking status: Former Smoker     Packs/day: 1.00     Years: 30.00     Pack years: 30.00     Smokeless tobacco: Former User     Quit date: 12/6/1961     Tobacco comment: 2 packs per year   Substance and Sexual Activity     Alcohol use: Yes     Alcohol/week: 0.0 standard drinks     Comment: socially     Drug use: No     Sexual activity: Yes     Partners: Female   Lifestyle     Physical activity:     Days per week: Not on file     Minutes per session: Not on file     Stress: Not on file   Relationships     Social connections:     Talks on phone: Not on file     Gets together: Not on file     Attends Christianity service: Not on file     Active member of club or organization: Not on file     Attends meetings of clubs or organizations: Not on file     Relationship status: Not on file     Intimate partner violence:     Fear of current or ex partner: Not on file     Emotionally abused: Not on file     Physically abused: Not on file     Forced sexual activity: Not on file   Other Topics Concern     Not on file   Social History Narrative    , 1 daughter    Retired post-       Surgical Hx:   Past Surgical History:   Procedure Laterality Date     C ANESTH,REMOVAL OF PROSTATE  9/1994    TURP     SURGICAL HISTORY OF -   7/2008    Cystourethroscopy, with laser ablation of ureteral stone     SURGICAL HISTORY OF -   3/2009    ORIF right ankle fracture     SURGICAL HISTORY OF -   6/2010    Removal of hardware, right ankle       Allergies:  No Known Allergies    Medications:   Current Outpatient Medications   Medication     COENZYME Q-10 PO      HYDROcodone-acetaminophen (NORCO) 5-325 MG tablet     lidocaine-prilocaine (EMLA) 2.5-2.5 % external cream     Lutein 40 MG CAPS     oxyCODONE (ROXICODONE) 5 MG tablet     PRILOSEC OTC 20 MG OR TBEC     sulfamethoxazole-trimethoprim (BACTRIM DS/SEPTRA DS) 800-160 MG tablet     TURMERIC PO     No current facility-administered medications for this encounter.        Labs:   Recent Labs   Lab Test 02/12/19  1452   ALBUMIN 4.3   HGB 12.4*   WBC 5.3     Creatinine   Date Value Ref Range Status   02/12/2019 0.84 0.66 - 1.25 mg/dL Final     GFR Estimate   Date Value Ref Range Status   02/12/2019 83 >60 mL/min/[1.73_m2] Final     Comment:     Non  GFR Calc  Starting 12/18/2018, serum creatinine based estimated GFR (eGFR) will be   calculated using the Chronic Kidney Disease Epidemiology Collaboration   (CKD-EPI) equation.       GFR Estimate If Black   Date Value Ref Range Status   02/12/2019 >90 >60 mL/min/[1.73_m2] Final     Comment:      GFR Calc  Starting 12/18/2018, serum creatinine based estimated GFR (eGFR) will be   calculated using the Chronic Kidney Disease Epidemiology Collaboration   (CKD-EPI) equation.       WBC   Date Value Ref Range Status   02/12/2019 5.3 4.0 - 11.0 10e9/L Final     Lab Results   Component Value Date    CR 0.84 02/12/2019        Nutrition requirements were discussed with patient today.  Objective:  BP (!) 149/87   Pulse 81   Temp 97  F (36.1  C) (Temporal)   Resp 16   Wound (used by OP I only) 01/27/20 0816 Left medial foot ulceration, venous (Active)   Length (cm) 2 1/27/2020  8:00 AM   Width (cm) 3.2 1/27/2020  8:00 AM   Depth (cm) 0.4 1/27/2020  8:00 AM   Wound (cm^2) 6.4 cm^2 1/27/2020  8:00 AM   Wound Volume (cm^3) 2.56 cm^3 1/27/2020  8:00 AM   Dressing Appearance moist drainage 1/27/2020  8:00 AM   Drainage Characteristics/Odor serosanguineous 1/27/2020  8:00 AM   Drainage Amount moderate 1/27/2020  8:00 AM        General:  Patient is alert and  orientated, no acute distress.   As the infection from the ulcer resolves he is much more comfortable.  Chest= clear  Cardiovascular= regular rate  Ulceration measures 2 x 3.2 x 0.4 cm.  Mild amount of slough and fibrin.  We are seeing some healthy granulation tissue developing.  Very minimal border erythema and borders are all viable with no undermining.  Slight changes noted over the left lateral foot but no obvious ulcer.    Multiple bruising from the stab phlebectomy sites but overall looks fine with no infection.  Mild calf and ankle edema is noted.      Vascular: Good circulation noted from the wound healing [place] consult documentation.    Procedure:   Patient was determined to be capable of making their own medical decisions and informed consent was obtained, topical anesthetic of 4% lidocaine was applied, debridement was performed.  We were able to excise all nonviable tissue including debridement of the skin edges and base down to healthy bleeding tissue.  A #15 blade was used to debride ulcer down to and including subcutaneous tissue. Bleeding controlled with light pressure. Patient tolerated procedure well with the topical anesthetic only and oral oxycodone he took.    Impression: Proving left medial heel venous ulcer.  Successful ablation of incompetent superficial veins along with the greater and lesser saphenous veins.  Infection is resolving and he will finish off his oral antibiotics.  We will initiate a fibrin dressing on the medial heel ulcer along with compression including edema wear for improved subdermal lymphatic drainage.  No specific dressings required for the lateral foot though this needs to be watched closely.    Plan:  We will dress the wounds with endoform antimicrobial followed by EdemaWear and Spandagrip.  He will change his daily.    Patient will return to the clinic in 2 weeks time.     Dylon Floyd MD on 1/27/2020 at 8:26 AM

## 2020-02-10 ENCOUNTER — HOSPITAL ENCOUNTER (OUTPATIENT)
Dept: WOUND CARE | Facility: CLINIC | Age: 80
Discharge: HOME | End: 2020-02-10
Attending: SURGERY | Admitting: SURGERY
Payer: COMMERCIAL

## 2020-02-10 VITALS — HEART RATE: 70 BPM | TEMPERATURE: 96.1 F | DIASTOLIC BLOOD PRESSURE: 82 MMHG | SYSTOLIC BLOOD PRESSURE: 145 MMHG

## 2020-02-10 DIAGNOSIS — I83.023 VENOUS STASIS ULCER OF LEFT ANKLE LIMITED TO BREAKDOWN OF SKIN WITH VARICOSE VEINS (H): ICD-10-CM

## 2020-02-10 DIAGNOSIS — L97.321 VENOUS STASIS ULCER OF LEFT ANKLE LIMITED TO BREAKDOWN OF SKIN WITH VARICOSE VEINS (H): ICD-10-CM

## 2020-02-10 PROCEDURE — 11042 DBRDMT SUBQ TIS 1ST 20SQCM/<: CPT | Performed by: SURGERY

## 2020-02-10 PROCEDURE — 11042 DBRDMT SUBQ TIS 1ST 20SQCM/<: CPT

## 2020-02-10 PROCEDURE — A6260 WOUND CLEANSER ANY TYPE/SIZE: HCPCS

## 2020-02-10 PROCEDURE — A6021 COLLAGEN DRESSING <=16 SQ IN: HCPCS

## 2020-02-10 NOTE — DISCHARGE INSTRUCTIONS
[facility]  [address] [address]     Call us at [phone] if you have any questions about your wounds, have redness or  swelling around your wound, have a fever of 101 or greater or if you have any other  problems or concerns. We answer the phone Monday through Friday 8 am to 4 pm,  please leave a message as we check the voicemail frequently throughout the day.    Juwan Montoya 1940    Left medial ankle venous ulcer wound care recommendations  After cleansing with saline or wound cleanser, apply small amount of VASHE on gauze, lay into wound bed, let sit for 10 minutes, remove gauze (do not rinse) then apply  Endoform Antimicrobial to wound bed followed by Spandage, gauze then Spandagrip E. Do daily    Remove compression dressing if toes turn blue and/or start to feel numb and is not relieved by elevating the leg for one hour.  Please raise your legs above your heart for 30 mins 3 times a day to promote wound healing.    Dylon Floyd M.D. February 10, 2020    Wound Healing Clinic Follow up with Dr. Floyd in 2 weeks

## 2020-02-10 NOTE — PROGRESS NOTES
[facility] Wound Healing [place] Progress Note    Subject: Juwan Montoya returns for follow-up of millimeters left medial heel ulcer.  Felt this was related to venous insufficiency and he is undergone recent successful closure and medically necessary stab phlebectomies on 1/15/2020 with good results.    On his last visit on 1/27/2020 we debrided the ulcer.  We then initiated him on endoform antimicrobial dressing for the ulcer along with EdemaWear and Spandagrip.  He is noticing much less pain at the ulcerated area.    He also also developed some early changes in the left lateral ankle distal to the malleolus.  With his compression protocol this is also improving.    Patient Active Problem List   Diagnosis     S/P TURP     Other specified disorder of skin     Esophageal reflux     Ankle pain     CARDIOVASCULAR SCREENING; LDL GOAL LESS THAN 130     Advanced directives, counseling/discussion     Anemia, unspecified type     Multiple rib fractures     Pain in joint involving ankle and foot, right     Venous stasis ulcer of left ankle (H)     Past Medical History:   Diagnosis Date     Asymptomatic varicose veins      Bladder stone      Chronic depressive personality disorder      Esophageal reflux      Hepatic cyst      Hypertrophy (benign) of prostate 9/94    turp operation     Nephrolithiasis      S/P TURP      Unspecified essential hypertension      Exam:  BP (!) 145/82   Pulse 70   Temp 96.1  F (35.6  C) (Temporal)   Wound (used by OP [facility] only) 01/27/20 0816 Left medial foot ulceration, venous (Active)   Length (cm) 1.7 2/10/2020  9:40 AM   Width (cm) 2.5 2/10/2020  9:40 AM   Depth (cm) 0.3 2/10/2020  9:40 AM   Wound (cm^2) 4.25 cm^2 2/10/2020  9:40 AM   Wound Volume (cm^3) 1.28 cm^3 2/10/2020  9:40 AM   Wound healing % 33.59 2/10/2020  9:40 AM   Dressing Appearance moist drainage 2/10/2020  9:40 AM   Drainage Characteristics/Odor serosanguineous 2/10/2020  9:40 AM   Drainage Amount moderate 2/10/2020  9:40  AM     Alert and appropriate.  Very comfortable.  Here with his wife.  Ulcerated area is improving.  Mild amount of slough and fibrin.  No undermining.  No cellulitis.  A few small scabs with no open ulcers noted in the lateral ankle region.    Stab phlebectomy sites on his calf and thigh are healing well.  Several underlying hematomas in the removed vein channel noted with no inflammatory changes or discomfort.    Procedure:   Patient was determined to be capable of making their own medical decisions and informed consent was obtained. Topical anesthetic of 4% lidocaine was applied, debridement was performed using a #15 blade down to and including subcutaneous tissue.  All slough and fibrin debrided.  Skin edges slightly excised down to healthy tissue.  He tolerated this quite well implying that the ulcer is less tender now that the cellulitis has been treated.  Granulation tissue is fairly robust following debridement.  Bleeding controlled with light pressure. Patient tolerated procedure well.    Impression: Continued improvement.  Will continue with endoform antimicrobial that is changing daily along with the EdemaWear and Spandagrip for swelling control.  He is soaking with VASCHE and gently washing the area before each dressing change.        We did discuss the ongoing wound care.  Also discussed the potential use of a split-thickness skin graft for final healing.  He does want to see if this will heal by secondary intent just like the ulcer on the right side.  This will be revisited on next visit.            During most of the day he has essentially no pain at the ulcer site.  Somewhat more bothersome at night.  He is taken Advil.  Does have a few oxycodones left from the surgery that he may need at night if the pain is preventing him from sleeping.    Plan: We will dress the wounds with endoform antimicrobial along with EdemaWear and Spandagrip.  These will be changed daily.    .  Patient will return to the  clinic in 2 weeks time    Dylon Floyd MD on 2/10/2020 at 9:53 AM

## 2020-02-12 ENCOUNTER — TELEPHONE (OUTPATIENT)
Dept: WOUND CARE | Facility: CLINIC | Age: 80
End: 2020-02-12

## 2020-02-12 NOTE — TELEPHONE ENCOUNTER
Clarified his wound care instructions.    Left medial ankle venous ulcer wound care recommendations  After cleansing with saline or wound cleanser, apply small amount of VASHE on gauze,  lay into wound bed, let sit for 10 minutes, remove gauze (do not rinse) then apply  Endoform Antimicrobial to wound bed followed by Spandage, gauze then Spandagrip  E. Do daily.    He said he understood and was doing it correctly.

## 2020-02-24 ENCOUNTER — HOSPITAL ENCOUNTER (OUTPATIENT)
Dept: WOUND CARE | Facility: CLINIC | Age: 80
Discharge: HOME | End: 2020-02-24
Attending: SURGERY | Admitting: SURGERY
Payer: MEDICARE

## 2020-02-24 VITALS — DIASTOLIC BLOOD PRESSURE: 84 MMHG | TEMPERATURE: 96.6 F | HEART RATE: 70 BPM | SYSTOLIC BLOOD PRESSURE: 141 MMHG

## 2020-02-24 DIAGNOSIS — L97.321 VENOUS STASIS ULCER OF LEFT ANKLE LIMITED TO BREAKDOWN OF SKIN WITH VARICOSE VEINS (H): ICD-10-CM

## 2020-02-24 DIAGNOSIS — I83.023 VENOUS STASIS ULCER OF LEFT ANKLE LIMITED TO BREAKDOWN OF SKIN WITH VARICOSE VEINS (H): ICD-10-CM

## 2020-02-24 PROCEDURE — A6021 COLLAGEN DRESSING <=16 SQ IN: HCPCS

## 2020-02-24 PROCEDURE — 11042 DBRDMT SUBQ TIS 1ST 20SQCM/<: CPT

## 2020-02-24 PROCEDURE — 11042 DBRDMT SUBQ TIS 1ST 20SQCM/<: CPT | Performed by: SURGERY

## 2020-02-24 PROCEDURE — 99212 OFFICE O/P EST SF 10 MIN: CPT | Mod: 25 | Performed by: SURGERY

## 2020-02-24 NOTE — DISCHARGE INSTRUCTIONS
[facility]  [address] [address]    Call us at [phone] if you have any questions about your wounds, have redness or  swelling around your wound, have a fever of 101 or greater or if you have any other  problems or concerns. We answer the phone Monday through Friday 8 am to 4 pm,  please leave a message as we check the voicemail frequently throughout the day.    Juwan Montoya 1940    Left medial ankle venous ulcer wound care recommendations  Clean wound with Vashe then apply Endoform Antimicrobial to wound bed followed by Spandage, gauze then Spandagrip  E. Do daily    Remove compression dressing if toes turn blue and/or start to feel numb and is not relieved by elevating the leg for one hour.  Please raise your legs above your heart for 30 mins 3 times a day to promote wound healing.    Dylon Floyd M.D. February 24, 2020    Wound Healing Clinic Follow up with Dr. Floyd in 2 weeks

## 2020-02-24 NOTE — PROGRESS NOTES
[facility] Wound Healing [place] Progress Note    Subject: Juwan Montoya Returns for follow-up of a left medial heel ulcer.  Spelt this is related to his chronic venous insufficiency.  He underwent successful closure and medically necessary stab phlebectomies on 1/15/2020 at our [facility] office.  Last seen on 2/10/2020.  He has been using endoform antimicrobial along with EdemaWear and a Spandagrip that he is changing daily.  Pain had been improving and he is taking only an Advil at night to help him sleep.    We have been debriding and treating the ulcerated area at the wound clinic.  He continues to improve.  Only occasionally needs an Advil at night.  He is using the endoform antimicrobial in the EdemaWear and starting to wear his medical grade thigh-high compression stocking which he is able to apply relatively easily.    Patient Active Problem List   Diagnosis     S/P TURP     Other specified disorder of skin     Esophageal reflux     Ankle pain     CARDIOVASCULAR SCREENING; LDL GOAL LESS THAN 130     Advanced directives, counseling/discussion     Anemia, unspecified type     Multiple rib fractures     Pain in joint involving ankle and foot, right     Venous stasis ulcer of left ankle (H)     Past Medical History:   Diagnosis Date     Asymptomatic varicose veins      Bladder stone      Chronic depressive personality disorder      Esophageal reflux      Hepatic cyst      Hypertrophy (benign) of prostate 9/94    turp operation     Nephrolithiasis      S/P TURP      Unspecified essential hypertension      Exam:  BP (!) 141/84   Pulse 70   Temp 96.6  F (35.9  C) (Temporal)      Alert and appropriate.  Here with his wife.  Quite comfortable.  Wearing a low-grade compression stocking on the right leg.  Still swollen mildly in the left calf.  The posterior medial ankle/heel ulcer continues to improve.  Mild amount of slough and fibrin is noted but much less.  Well-defined skin edges with no undermining.   Starting to see at least 80% the wound with healthy firm granulation tissue.  No signs of cellulitis.    Procedure:   Patient was determined to be capable of making their own medical decisions and informed consent was obtained. Topical anesthetic of 4% lidocaine was applied, debridement was performed using a #15 blade down to and including subcutaneous tissue.  All slough and fibrin removed.  Edges slightly debrided to encourage tissue ingrowth and bleeding.  Bleeding controlled with light pressure. Patient tolerated procedure well.    Impression: Continued improvement of left posterior medial ankle/heel ulcer.  He has been soaking this with VASCHE daily between dressing changes.  However we found that he was moistening his endoform antimicrobial on this which may counteract its effect.  We recommend that once he soaks the ankle to rinse it off with saline and use the saline to moisten the endoform antimicrobial.  Continue with the EdemaWear and his compression.    Plan: We will dress the wounds with endoform antimicrobial moistened and activated with saline..  Patient will return to the clinic in 2 weeks time    Dylon Floyd MD on 2/24/2020 at 9:38 AM

## 2020-03-09 ENCOUNTER — HOSPITAL ENCOUNTER (OUTPATIENT)
Dept: WOUND CARE | Facility: CLINIC | Age: 80
Discharge: HOME | End: 2020-03-09
Attending: SURGERY | Admitting: SURGERY
Payer: MEDICARE

## 2020-03-09 VITALS
RESPIRATION RATE: 16 BRPM | DIASTOLIC BLOOD PRESSURE: 88 MMHG | HEART RATE: 75 BPM | SYSTOLIC BLOOD PRESSURE: 148 MMHG | TEMPERATURE: 96.8 F

## 2020-03-09 DIAGNOSIS — L97.321 VENOUS STASIS ULCER OF LEFT ANKLE LIMITED TO BREAKDOWN OF SKIN WITH VARICOSE VEINS (H): ICD-10-CM

## 2020-03-09 DIAGNOSIS — I83.023 VENOUS STASIS ULCER OF LEFT ANKLE LIMITED TO BREAKDOWN OF SKIN WITH VARICOSE VEINS (H): ICD-10-CM

## 2020-03-09 PROCEDURE — A6021 COLLAGEN DRESSING <=16 SQ IN: HCPCS

## 2020-03-09 PROCEDURE — 11042 DBRDMT SUBQ TIS 1ST 20SQCM/<: CPT | Performed by: SURGERY

## 2020-03-09 PROCEDURE — 11042 DBRDMT SUBQ TIS 1ST 20SQCM/<: CPT

## 2020-03-09 NOTE — DISCHARGE INSTRUCTIONS
[facility]  [address] [address]    Call us at [phone] if you have any questions about your wounds, have redness or swelling around your wound, have a fever of 101 or greater or if you have any other problems or concerns. We answer the phone Monday through Friday 8 am to 4 pm, please leave a message as we check the voicemail frequently throughout the day.    Juwan Montoya 1940    Left medial ankle venous ulcer wound care recommendations  Clean wound with Vashe then apply Endoform Antimicrobial to wound bed followed by Spandage, gauze then Spandagrip E. Do daily    Remove compression dressing if toes turn blue and/or start to feel numb and is not relieved by elevating the leg for one hour.  Please raise your legs above your heart for 30 mins 3 times a day to promote wound healing.    Dylon Floyd M.D. March 9, 2020    Wound Healing Clinic Follow up with Dr. Floyd in 2 weeks

## 2020-03-09 NOTE — PROGRESS NOTES
[facility] Wound Healing [place] Progress Note    Subject: Juwan Montoya returns for follow-up of his left medial ankle ulcer felt due to venous insufficiency.  Underwent closure of incompetent greater system at the vein solution office successfully.  Have been treating the ulcer with every other week debridement here in the clinic along with endoform antimicrobial-EdemaWear-compression stockings ([employer])  He has been tolerating this very well.  He changes the dressing daily and does take down the outer dressing at the end of the day to moisten the site.  Essentially has no pain.  Purchased a Bernard which allows him to get his compression stocking over the EdemaWear with no difficulty.    Appetite and nutrition have been good.  He is fully ambulatory.  Lives independently with his wife.    Patient Active Problem List   Diagnosis     S/P TURP     Other specified disorder of skin     Esophageal reflux     Ankle pain     CARDIOVASCULAR SCREENING; LDL GOAL LESS THAN 130     Advanced directives, counseling/discussion     Anemia, unspecified type     Multiple rib fractures     Pain in joint involving ankle and foot, right     Venous stasis ulcer of left ankle (H)     Past Medical History:   Diagnosis Date     Asymptomatic varicose veins      Bladder stone      Chronic depressive personality disorder      Esophageal reflux      Hepatic cyst      Hypertrophy (benign) of prostate 9/94    turp operation     Nephrolithiasis      S/P TURP      Unspecified essential hypertension      Exam:  BP (!) 148/88   Pulse 75   Temp 96.8  F (36  C) (Temporal)   Resp 16   Wound (used by OP [facility] only) 01/27/20 0816 Left medial foot ulceration, venous (Active)   Length (cm) 1.5 03/09/20 0936   Width (cm) 2.1 03/09/20 0936   Depth (cm) 0.2 03/09/20 0936   Wound (cm^2) 3.15 cm^2 03/09/20 0936   Wound Volume (cm^3) 0.63 cm^3 03/09/20 0936   Wound healing % 50.78 03/09/20 0936   Dressing Appearance moist drainage 03/09/20 0936   Drainage  Characteristics/Odor serosanguineous 03/09/20 0936   Drainage Amount moderate 03/09/20 0936     Alert and appropriate.  Very comfortable.  Here with his wife.  Dressings removed.  Ulcer continues to improve.  Mild amount of slough and fibrin.  Very healthy granulation tissue otherwise noted.  No edema.  No cellulitis.  No undermining.  +3 palpable left dorsalis pedis pulse.    Procedure:   Patient was determined to be capable of making their own medical decisions and informed consent was obtained. Topical anesthetic of 4% lidocaine was applied, debridement was performed using a #15 blade down to and including subcutaneous tissue.  All slough and fibrin removed and debrided the skin edges 0.1 cm.  Very healthy excellent granulation tissue noted throughout.  Bleeding controlled with light pressure. Patient tolerated procedure well.    Impression: Continued improvement.  Continue with the endoform antimicrobial along with the compression as described above.  His compression stockings are quite old and we have given him a prescription for the medically necessary compression stockings due to his venous ulceration that he will have filled at [facility].    Plan: We will dress the wounds with above.  Patient will return to the clinic in 2 weeks time    Dylon Floyd MD on 3/9/2020 at 9:49 AM

## 2020-03-24 DIAGNOSIS — L97.319 VARICOSE VEINS OF RIGHT LOWER EXTREMITY WITH ULCER OF ANKLE (H): Primary | ICD-10-CM

## 2020-03-24 DIAGNOSIS — I83.013 VARICOSE VEINS OF RIGHT LOWER EXTREMITY WITH ULCER OF ANKLE (H): Primary | ICD-10-CM

## 2020-04-27 ENCOUNTER — HOSPITAL ENCOUNTER (OUTPATIENT)
Dept: WOUND CARE | Facility: CLINIC | Age: 80
Discharge: HOME | End: 2020-04-27
Attending: SURGERY | Admitting: SURGERY
Payer: MEDICARE

## 2020-04-27 VITALS
HEART RATE: 80 BPM | TEMPERATURE: 97.7 F | RESPIRATION RATE: 18 BRPM | DIASTOLIC BLOOD PRESSURE: 80 MMHG | SYSTOLIC BLOOD PRESSURE: 128 MMHG

## 2020-04-27 DIAGNOSIS — I83.023 VENOUS STASIS ULCER OF LEFT ANKLE LIMITED TO BREAKDOWN OF SKIN WITH VARICOSE VEINS (H): ICD-10-CM

## 2020-04-27 DIAGNOSIS — L97.321 VENOUS STASIS ULCER OF LEFT ANKLE LIMITED TO BREAKDOWN OF SKIN WITH VARICOSE VEINS (H): ICD-10-CM

## 2020-04-27 PROCEDURE — 11042 DBRDMT SUBQ TIS 1ST 20SQCM/<: CPT

## 2020-04-27 PROCEDURE — 11042 DBRDMT SUBQ TIS 1ST 20SQCM/<: CPT | Performed by: SURGERY

## 2020-04-27 PROCEDURE — A6021 COLLAGEN DRESSING <=16 SQ IN: HCPCS

## 2020-04-27 NOTE — DISCHARGE INSTRUCTIONS
[facility]  [address] [address]    Call us at [phone] if you have any questions about your wounds, have redness  or swelling around your wound, have a fever of 101 or greater or if you have any  other problems or concerns. We answer the phone Monday through Friday 8 am to 4  pm, please leave a message as we check the voicemail frequently throughout the day.    Juwan Montoya 1940    Left medial ankle venous ulcer wound care recommendations  Clean wound with Vashe then apply Endoform Antimicrobial to wound bed followed  by Spandage, gauze then Spandagrip E. Do daily    Remove compression dressing if toes turn blue and/or start to feel numb and is not  relieved by elevating the leg for one hour.  Please raise your legs above your heart for 30 mins 3 times a day to promote  wound healing.    Dylon Floyd M.D. April 27, 2020    Wound Healing Clinic Follow up with Dr. Floyd in 3 weeks

## 2020-04-27 NOTE — PROGRESS NOTES
[facility] Wound [facility]aling [place] Progress Note    Subject: Juwan Montoya returns for follow-up of his left posterior medial calf venous insufficiency ulcer.  This had a history of infection treated with debridement and antibiotics.  Underwent closure of the incompetent greater saphenous vein subsequent wound care.  Last seen in the clinic on 3/9/2020 where the ulcer is significant improved with good granulation tissue.  Also at that time measured approximately 3 x 2 x 0.3 cm.  He has been using endoform antimicrobial on the ulcer along with compression stockings.    He has had minimal if any pain associated with the ulcer.  Size is significantly decreased as noted in the photograph.  However he does get some slough at the base of the wound and a trying to clean this.  He is been trying to use a toothbrush but not able to get it into the smaller ulcer.  Comes for follow-up today.    Lives independently.  Fully ambulatory and very active.  Very compliant with compression.    Patient Active Problem List   Diagnosis     S/P TURP     Other specified disorder of skin     Esophageal reflux     Ankle pain     CARDIOVASCULAR SCREENING; LDL GOAL LESS THAN 130     Advanced directives, counseling/discussion     Anemia, unspecified type     Multiple rib fractures     Pain in joint involving ankle and foot, right     Venous stasis ulcer of left ankle (H)     Past Medical History:   Diagnosis Date     Asymptomatic varicose veins      Bladder stone      Chronic depressive personality disorder      Esophageal reflux      Hepatic cyst      Hypertrophy (benign) of prostate 9/94    turp operation     Nephrolithiasis      S/P TURP      Unspecified essential hypertension      Exam:  /80   Pulse 80   Temp 97.7  F (36.5  C) (Temporal)   Resp 18   Wound (used by OP [facility] only) 01/27/20 0816 Left medial foot ulceration, venous (Active)   Length (cm) 0.5 04/27/20 1400   Width (cm) 0.9 04/27/20 1400   Depth (cm) 0.3 04/27/20 1400    Wound (cm^2) 0.45 cm^2 04/27/20 1400   Wound Volume (cm^3) 0.14 cm^3 04/27/20 1400   Wound healing % 92.97 04/27/20 1400   Dressing Appearance moist drainage 04/27/20 1400   Drainage Characteristics/Odor serosanguineous 04/27/20 1400   Drainage Amount moderate 04/27/20 1400     Alert and appropriate.  Very comfortable.  Normal affect.  Good palpable pedal pulses.  Very well controlled swelling in his leg with his compression.  Ulceration has significantly decreased in size now measuring 0.5 x 0.9 x 0.3 cm.  Mild amount of slough is noted at the base of the wound.  No evidence of undermining or infection.  No periwound edema.    Procedure:   Patient was determined to be capable of making their own medical decisions and informed consent was obtained. Topical anesthetic of 4% lidocaine was applied, debridement was performed using a #15 blade down to and including subcutaneous tissue.  All slough and fibrin removed and skin edges debrided down to bleeding tissue.  Tolerated this very well.  Bleeding controlled with light pressure. Patient tolerated procedure well.    Impression: Continued improvement in the left posterior medial venous insufficiency ulcer.  Excellent arterial blood supply treated underlying venous insufficiency (no incompetent perforators at the location of the ulcer).  We will continue with endoform antimicrobial dressing changes.  I given him a #15 blade scalpel to allow him to be removed some of the slough himself much better than the attempts with a toothbrush.    Plan: We will dress the wounds with above.  Patient will return to the clinic in 3 weeks time    Dylon Floyd MD on 4/27/2020 at 3:04 PM

## 2020-05-18 ENCOUNTER — HOSPITAL ENCOUNTER (OUTPATIENT)
Dept: WOUND CARE | Facility: CLINIC | Age: 80
Discharge: HOME | End: 2020-05-18
Attending: SURGERY | Admitting: SURGERY
Payer: MEDICARE

## 2020-05-18 VITALS
DIASTOLIC BLOOD PRESSURE: 81 MMHG | HEART RATE: 77 BPM | TEMPERATURE: 96.7 F | SYSTOLIC BLOOD PRESSURE: 156 MMHG | RESPIRATION RATE: 20 BRPM

## 2020-05-18 DIAGNOSIS — I83.023 VENOUS STASIS ULCER OF LEFT ANKLE LIMITED TO BREAKDOWN OF SKIN WITH VARICOSE VEINS (H): ICD-10-CM

## 2020-05-18 DIAGNOSIS — L97.321 VENOUS STASIS ULCER OF LEFT ANKLE LIMITED TO BREAKDOWN OF SKIN WITH VARICOSE VEINS (H): ICD-10-CM

## 2020-05-18 PROCEDURE — 97597 DBRDMT OPN WND 1ST 20 CM/<: CPT

## 2020-05-18 PROCEDURE — 97597 DBRDMT OPN WND 1ST 20 CM/<: CPT | Performed by: SURGERY

## 2020-05-18 PROCEDURE — 11042 DBRDMT SUBQ TIS 1ST 20SQCM/<: CPT

## 2020-05-18 NOTE — DISCHARGE INSTRUCTIONS
[facility]  [address] [address]    Call us at [phone] if you have any questions about your wounds, have redness  or swelling around your wound, have a fever of 101 or greater or if you have any  other problems or concerns. We answer the phone Monday through Friday 8 am to 4  pm, please leave a message as we check the voicemail frequently throughout the day.    Juwan Montoya 1940    Left medial ankle venous ulcer wound care recommendations  Clean wound in the shower then apply Plurogel to wound bed followed  by Band-Aid followed by Spandage. Do daily.     Continue to wear your compression socks daily.  Remove compression dressing if toes turn blue and/or start to feel numb and is not  relieved by elevating the leg for one hour.  Please raise your legs above your heart for 30 mins 3 times a day to promote  wound healing.    Dylon Floyd M.D. May 18, 2020    Wound Healing Clinic follow up with Dr. Floyd in 4 weeks

## 2020-05-18 NOTE — PROGRESS NOTES
[facility] Wound Healing [place] Progress Note    Subject: Juwan Montoya turns for follow-up of his left medial ankle venous insufficiency ulcer.  He underwent successful closure of his incompetent greater saphenous vein and stab phlebectomies followed by wound debridement and dressings.    He has been using endoform over the area followed by a Cristy-Spandagrip- compression stocking is changing on a daily basis.  He has had no pain at the site.  Ulcer is significantly decreased in size making the endoform difficult to apply to the area.  Comes for follow-up today.    He has had a history of similar ulcers on the right leg that were treated and has had no recurrence.  He continues with compression on that side also.    Patient Active Problem List   Diagnosis     S/P TURP     Other specified disorder of skin     Esophageal reflux     Ankle pain     CARDIOVASCULAR SCREENING; LDL GOAL LESS THAN 130     Advanced directives, counseling/discussion     Anemia, unspecified type     Multiple rib fractures     Pain in joint involving ankle and foot, right     Venous stasis ulcer of left ankle (H)     Past Medical History:   Diagnosis Date     Asymptomatic varicose veins      Bladder stone      Chronic depressive personality disorder      Esophageal reflux      Hepatic cyst      Hypertrophy (benign) of prostate 9/94    turp operation     Nephrolithiasis      S/P TURP      Unspecified essential hypertension      Exam:  BP (!) 156/81   Pulse 77   Temp 96.7  F (35.9  C) (Temporal)   Resp 20   Wound (used by OP [facility] only) 01/27/20 0816 Left medial foot ulceration, venous (Active)   Length (cm) 0.9 05/18/20 1000   Width (cm) 0.2 05/18/20 1000   Depth (cm) 0.2 05/18/20 1000   Wound (cm^2) 0.18 cm^2 05/18/20 1000   Wound Volume (cm^3) 0.04 cm^3 05/18/20 1000   Wound healing % 97.19 05/18/20 1000   Dressing Appearance moist drainage 05/18/20 1000   Drainage Characteristics/Odor serosanguineous 05/18/20 1000   Drainage Amount  moderate 05/18/20 1000   Thickness/Stage full thickness 05/18/20 1115   Base red/granulating 05/18/20 1115   Periwound intact;edematous 05/18/20 1115   Periwound Temperature warm 05/18/20 1115   Care, Wound debrided 05/18/20 1115     Alert and appropriate.  Very comfortable.  No left  Leg or foot edema.  No cellulitis.  Good distal pulses.  Ulceration is significantly decreased in size.  Surrounding callus noted.    Procedure:   Patient was determined to be capable of making their own medical decisions and informed consent was obtained. Topical anesthetic of 4% lidocaine was applied, debridement was performed using a #15 blade scalpel in a selective fashion.  Remove the surrounding callus.  Debrided the base of the wound is slightly debrided the skin edges of the superficial ulcer with very excellent granulation tissue.  Bleeding controlled with light pressure. Patient tolerated procedure well.    Impression: Continued improvement in healing.  Due to the decreased size of the ulcer we will switch over to a PleuroGel.  He will apply an overlying Band-Aid or 2 x 2 followed by the Spandagrip in his compression stocking.  He will change this on a daily basis.  No restrictions to his activities.    Plan: We will dress the wounds with PleuroGel and compression.  Patient will return to the clinic in 4 weeks time    Dylon Floyd MD on 5/18/2020 at 11:20 AM

## 2020-06-15 ENCOUNTER — HOSPITAL ENCOUNTER (OUTPATIENT)
Dept: WOUND CARE | Facility: CLINIC | Age: 80
Discharge: HOME | End: 2020-06-15
Attending: SURGERY | Admitting: SURGERY
Payer: MEDICARE

## 2020-06-15 VITALS
TEMPERATURE: 96.7 F | HEART RATE: 94 BPM | SYSTOLIC BLOOD PRESSURE: 143 MMHG | RESPIRATION RATE: 20 BRPM | DIASTOLIC BLOOD PRESSURE: 76 MMHG

## 2020-06-15 DIAGNOSIS — L97.321 VENOUS STASIS ULCER OF LEFT ANKLE LIMITED TO BREAKDOWN OF SKIN WITH VARICOSE VEINS (H): ICD-10-CM

## 2020-06-15 DIAGNOSIS — I83.023 VENOUS STASIS ULCER OF LEFT ANKLE LIMITED TO BREAKDOWN OF SKIN WITH VARICOSE VEINS (H): ICD-10-CM

## 2020-06-15 PROCEDURE — 97597 DBRDMT OPN WND 1ST 20 CM/<: CPT | Performed by: SURGERY

## 2020-06-15 PROCEDURE — 97597 DBRDMT OPN WND 1ST 20 CM/<: CPT

## 2020-06-15 NOTE — PROGRESS NOTES
[facility] Wound Healing [place] Progress Note    Subject: Juwan Montoya developed a posterior medial venous insufficiency ulcer.  He is undergone successful ablation of his incompetent greater saphenous vein and varicosities.  Ulcer is been slowly improving.  He has been using PleuroGel on this on a daily basis along with his compression.  He has no specific complaints related to this.    Slight scab had developed over the ulcerated area and he felt this had likely healed.  However, several days ago he started to peel off the scab and realize he still had a superficial ulcer.  Has continued with the PleuroGel.    Patient Active Problem List   Diagnosis     S/P TURP     Other specified disorder of skin     Esophageal reflux     Ankle pain     CARDIOVASCULAR SCREENING; LDL GOAL LESS THAN 130     Advanced directives, counseling/discussion     Anemia, unspecified type     Multiple rib fractures     Pain in joint involving ankle and foot, right     Venous stasis ulcer of left ankle (H)     Past Medical History:   Diagnosis Date     Asymptomatic varicose veins      Bladder stone      Chronic depressive personality disorder      Esophageal reflux      Hepatic cyst      Hypertrophy (benign) of prostate 9/94    turp operation     Nephrolithiasis      S/P TURP      Unspecified essential hypertension      Exam:  BP (!) 143/76   Pulse 94   Temp 96.7  F (35.9  C) (Temporal)   Resp 20   Wound (used by OP [facility] only) 01/27/20 0816 Left medial foot ulceration, venous (Active)   Length (cm) 0.3 06/15/20 1100   Width (cm) 0.8 06/15/20 1100   Depth (cm) 0.3 06/15/20 1100   Wound (cm^2) 0.24 cm^2 06/15/20 1100   Wound Volume (cm^3) 0.07 cm^3 06/15/20 1100   Wound healing % 96.25 06/15/20 1100   Dressing Appearance moist drainage 06/15/20 1100   Drainage Characteristics/Odor serosanguineous 06/15/20 1100   Drainage Amount moderate 06/15/20 1100     No complaints.  Very comfortable.  Superficial ulcer as described with minimal  if any fibrin.  Slight overhanging of the skin edges.  No induration.  No erythema  Skin is soft and supple on the calf and foot with his compression use.  +3 palpable left dorsalis pedis pulse    Procedure:   Patient was determined to be capable of making their own medical decisions and informed consent was obtained. Topical anesthetic of 4% lidocaine was applied, debridement was performed using a #15 blade in a selective fashion to remove the slightly overlying skin edges and slightly debrided the base of the wound so we had good bleeding.  Base is very firm with good healthy granulation tissue.  No undermining after skin edges were slightly cut back.  Bleeding controlled with light pressure. Patient tolerated procedure well.    Impression: Ulceration is still present.  However, considering original size it looks quite good.  With this new opening we will continue with the PleuroGel along with a Band-Aid.  Spandage and compression stocking are applied.    Plan: We will dress the wounds with above.  Patient will return to the clinic in 4 weeks time    Dylon Floyd MD on 6/15/2020 at 11:11 AM

## 2020-06-15 NOTE — DISCHARGE INSTRUCTIONS
[facility]  [address] [address]    Call us at [phone] if you have any questions about your wounds, have redness  or swelling around your wound, have a fever of 101 or greater or if you have any  other problems or concerns. We answer the phone Monday through Friday 8 am to 4  pm, please leave a message as we check the voicemail frequently throughout the day.    Juwan Montoya 1940    Left medial ankle venous ulcer wound care recommendations  Clean wound in the shower then apply Plurogel to wound bed followed  by Band-Aid followed by Spandage. Do daily.    Continue to wear your compression socks daily.    Remove compression dressing if toes turn blue and/or start to feel numb and is not  relieved by elevating the leg for one hour.  Please raise your legs above your heart for 30 mins 3 times a day to promote  wound healing.    Dylon Floyd M.D. Estrellita 15, 2020    Wound Healing Clinic follow up with Dr. Floyd in 4 weeks

## 2020-07-10 ENCOUNTER — TRANSFERRED RECORDS (OUTPATIENT)
Dept: HEALTH INFORMATION MANAGEMENT | Facility: CLINIC | Age: 80
End: 2020-07-10

## 2020-07-13 ENCOUNTER — HOSPITAL ENCOUNTER (OUTPATIENT)
Dept: WOUND CARE | Facility: CLINIC | Age: 80
Discharge: HOME | End: 2020-07-13
Attending: SURGERY | Admitting: SURGERY
Payer: MEDICARE

## 2020-07-13 VITALS — HEART RATE: 71 BPM | SYSTOLIC BLOOD PRESSURE: 146 MMHG | DIASTOLIC BLOOD PRESSURE: 79 MMHG | TEMPERATURE: 96.9 F

## 2020-07-13 DIAGNOSIS — L97.321 VENOUS STASIS ULCER OF LEFT ANKLE LIMITED TO BREAKDOWN OF SKIN WITH VARICOSE VEINS (H): ICD-10-CM

## 2020-07-13 DIAGNOSIS — I83.023 VENOUS STASIS ULCER OF LEFT ANKLE LIMITED TO BREAKDOWN OF SKIN WITH VARICOSE VEINS (H): ICD-10-CM

## 2020-07-13 PROCEDURE — 97597 DBRDMT OPN WND 1ST 20 CM/<: CPT | Performed by: SURGERY

## 2020-07-13 PROCEDURE — 97597 DBRDMT OPN WND 1ST 20 CM/<: CPT

## 2020-07-13 NOTE — DISCHARGE INSTRUCTIONS
[facility]  [address] [address]    Call us at [phone] if you have any questions about your wounds, have redness or swelling around your wound, have a fever of 101 or greater or if you have any other problems or concerns. We answer the phone Monday through Friday 8 am to 4 pm, please leave a message as we check the voicemail frequently throughout the day.     Juwan Montoya      1940    Wound care recommendations to left medial ankle  Clean with soap and water in the shower. Fold a 2x2 gauze in half and place on top of wound, let your compression sock hold in place. Change daily to every other day    Please raise your legs above your heart for 30 mins 3 times a day to promote wound healing.    Remove compression dressing if toes turn blue and/or start to feel numb and is not relieved by elevating the leg for one hour.      Dylon Floyd M.D. July 13, 2020    Follow up in 3 weeks if needed

## 2020-07-13 NOTE — PROGRESS NOTES
[facility] Wound Healing [place] Progress Note    Subject: Juwan Montoya returns for follow-up of his left medial heel ulceration secondary to venous insufficiency.  Is undergone successful closure of his incompetent left greater saphenous vein and medically necessary stab phlebectomies.  Ulcer has significant decreased in size but always of the very small opening is noted.  Patient is completely asymptomatic.  Has been placing PleuroGel and folded 2 x 2 under his compression stocking on the site.  Has no complaints at all.    However, we had expected complete healing on the visit today but there is still a small ulceration with a firmer surrounding tissue.    Patient Active Problem List   Diagnosis     S/P TURP     Other specified disorder of skin     Esophageal reflux     Ankle pain     CARDIOVASCULAR SCREENING; LDL GOAL LESS THAN 130     Advanced directives, counseling/discussion     Anemia, unspecified type     Multiple rib fractures     Pain in joint involving ankle and foot, right     Venous stasis ulcer of left ankle (H)     Past Medical History:   Diagnosis Date     Asymptomatic varicose veins      Bladder stone      Chronic depressive personality disorder      Esophageal reflux      Hepatic cyst      Hypertrophy (benign) of prostate 9/94    turp operation     Nephrolithiasis      S/P TURP      Unspecified essential hypertension      Exam:  There were no vitals taken for this visit.  Wound (used by [facility] only) 01/27/20 0816 Left medial foot ulceration, venous (Active)   Length (cm) 0.4 07/13/20 1019   Width (cm) 0.8 07/13/20 1019   Depth (cm) 0.2 07/13/20 1019   Wound (cm^2) 0.32 cm^2 07/13/20 1019   Wound Volume (cm^3) 0.06 cm^3 07/13/20 1019   Wound healing % 95 07/13/20 1019   Dressing Appearance moist drainage 07/13/20 1019   Drainage Characteristics/Odor serosanguineous 07/13/20 1019   Drainage Amount moderate 07/13/20 1019     Alert and appropriate.  Very comfortable.  No significant edema.   Good pulses.  Medial ulcer located [facility] between the calcaneus and medial malleolus is still present.  Somewhat firmer tissue around the ulcer which is very small and superficial and quite clean.  Rest of his calf and foot is soft and supple.    Procedure:   Patient was determined to be capable of making their own medical decisions and informed consent was obtained. Topical anesthetic of 4% lidocaine was applied, debridement was performed using a #15 blade in a selective fashion.  Remove the callus around the edge and slightly debrided the base of the wound with healthy bleeding tissue noted that is quite superficial.  Bleeding controlled with light pressure. Patient tolerated procedure well.    Impression: Very clean site but complete healing has not occurred.  Selective debridement performed today down to a clean base.  He will place a folded 2 x 2 over the site with no underlying PleuroGel along with compression changes daily to see if this will allow for healing.    Plan: We will dress the wounds with above.  Patient will return to the clinic in 3 weeks time    Dylon Floyd MD on 7/13/2020 at 10:19 AM

## 2020-08-05 ENCOUNTER — TELEPHONE (OUTPATIENT)
Dept: SURGERY | Facility: CLINIC | Age: 80
End: 2020-08-05

## 2020-12-10 ENCOUNTER — TRANSFERRED RECORDS (OUTPATIENT)
Dept: HEALTH INFORMATION MANAGEMENT | Facility: CLINIC | Age: 80
End: 2020-12-10

## 2021-02-12 ENCOUNTER — IMMUNIZATION (OUTPATIENT)
Dept: NURSING | Facility: CLINIC | Age: 81
End: 2021-02-12
Payer: MEDICARE

## 2021-02-12 PROCEDURE — 91300 PR COVID VAC PFIZER DIL RECON 30 MCG/0.3 ML IM: CPT

## 2021-02-12 PROCEDURE — 0001A PR COVID VAC PFIZER DIL RECON 30 MCG/0.3 ML IM: CPT

## 2021-03-05 ENCOUNTER — IMMUNIZATION (OUTPATIENT)
Dept: NURSING | Facility: CLINIC | Age: 81
End: 2021-03-05
Attending: INTERNAL MEDICINE
Payer: MEDICARE

## 2021-03-05 PROCEDURE — 0002A PR COVID VAC PFIZER DIL RECON 30 MCG/0.3 ML IM: CPT

## 2021-03-05 PROCEDURE — 91300 PR COVID VAC PFIZER DIL RECON 30 MCG/0.3 ML IM: CPT

## 2021-10-30 ENCOUNTER — OFFICE VISIT (OUTPATIENT)
Dept: URGENT CARE | Facility: URGENT CARE | Age: 81
End: 2021-10-30
Payer: MEDICARE

## 2021-10-30 ENCOUNTER — ANCILLARY PROCEDURE (OUTPATIENT)
Dept: GENERAL RADIOLOGY | Facility: CLINIC | Age: 81
End: 2021-10-30
Attending: PHYSICIAN ASSISTANT
Payer: MEDICARE

## 2021-10-30 VITALS
HEART RATE: 66 BPM | SYSTOLIC BLOOD PRESSURE: 144 MMHG | TEMPERATURE: 98.7 F | OXYGEN SATURATION: 97 % | DIASTOLIC BLOOD PRESSURE: 76 MMHG

## 2021-10-30 DIAGNOSIS — M25.552 HIP PAIN, LEFT: ICD-10-CM

## 2021-10-30 DIAGNOSIS — M25.552 HIP PAIN, LEFT: Primary | ICD-10-CM

## 2021-10-30 PROCEDURE — 73502 X-RAY EXAM HIP UNI 2-3 VIEWS: CPT | Mod: LT | Performed by: INTERNAL MEDICINE

## 2021-10-30 PROCEDURE — 99213 OFFICE O/P EST LOW 20 MIN: CPT | Performed by: PHYSICIAN ASSISTANT

## 2021-10-30 ASSESSMENT — ENCOUNTER SYMPTOMS: ARTHRALGIAS: 1

## 2021-10-30 NOTE — PATIENT INSTRUCTIONS
Patient Education     Hip Strain    You have a strain of the muscles around the hip joint. A muscle strain is a stretching or tearing of muscle fibers. This causes pain, especially when you move that muscle. There may also be some swelling and bruising.  Home care    Stay off the injured leg as much as possible until you can walk on it without pain. If you have a lot of pain with walking, crutches or a walker may be prescribed. These can be rented or purchased at many pharmacies and surgical or orthopedic supply stores. Follow your healthcare provider's advice about when to start putting weight on that leg.    Apply an ice pack over the injured area for 15 to 20 minutes every 3 to 6 hours. Do this for the first 24 to 48 hours. You can make an ice pack by filling a plastic bag that seals at the top with ice cubes and then wrapping it with a thin towel. Be careful not to injure your skin with the ice treatments. Ice should never be applied directly to skin. Continue the use of ice packs for relief of pain and swelling as needed. After 48 hours, apply heat (warm shower or warm bath) for 15 to 20 minutes several times a day, or alternate ice and heat.    You may use over-the-counter pain medicine to control pain, unless another pain medicine was prescribed. If you have chronic liver or kidney disease or ever had a stomach ulcer or gastrointestinal bleeding, talk with your healthcare provider before using these medicines.    If you play sports, you may resume these activities when you are able to hop and run on the injured leg without pain.  Follow-up care  Follow up with your healthcare provider, or as advised. If your symptoms don't start to get better after a week, more tests may be needed.  If X-rays were taken, you will be told of any new findings that may affect your care.  When to seek medical advice  Call your healthcare provider right away if any of these occur:    Increased swelling or bruising    Increased  pain    Losing the ability to put weight on the injured side  Ty last reviewed this educational content on 5/1/2018 2000-2021 [employer]. All rights reserved. This information is not intended as a substitute for professional medical care. Always follow your healthcare professional's instructions.

## 2021-10-30 NOTE — PROGRESS NOTES
SUBJECTIVE:   Juwan Montoya is a 81 year old male presenting with a chief complaint of   Chief Complaint   Patient presents with     Urgent Care     Fall     patient fell and twisted left hip       He is an established patient of [facility].  Patient presents with complaints of left hip pain after falling and twisting his left hip PTA.  Patient was blowing leaves and tripped over stump into bush.  No know left hip problems.  No osteopenia/porosis.  Can ambulate, but with limp.          Review of Systems   Musculoskeletal: Positive for arthralgias.   All other systems reviewed and are negative.      Past Medical History:   Diagnosis Date     Asymptomatic varicose veins      Bladder stone      Chronic depressive personality disorder      Esophageal reflux      Hepatic cyst      Hypertrophy (benign) of prostate 9/94    turp operation     Nephrolithiasis      S/P TURP      Unspecified essential hypertension      Family History   Problem Relation Age of Onset     Family History Negative Father      Current Outpatient Medications   Medication Sig Dispense Refill     COENZYME Q-10 PO Take 100 mg by mouth At Bedtime       lidocaine-prilocaine (EMLA) 2.5-2.5 % external cream Apply topically as needed for moderate pain 30 g 3     Lutein 40 MG CAPS Take 40 mg by mouth At Bedtime       order for [facility] Order Phone [phone] Fax [phone]    Primary Dressing Endoform Antimicrobial 2x2  Qty 5 sheets  Secondary Dressing 4x4 nonsterile gauze Qty 200 ct loaf    Length of Need: 1 month  Frequency of dressing change: daily 30 days 0     order for [employer] Compression Stockings  Phone #[phone]  Fax #[phone]    Compression sock, 30-40 mmHg  Length of Need: Life Time  # of Pairs 2  Open Wound   Swelling of limb  Localized Edema  Lymphedema  Chronic Ulcer of skin  Varicose Vein of Lower Ext 2 Device 0     PRILOSEC OTC 20 MG OR TBEC 1 TABLET EVERY OTHER DAY AT NIGHT 14 0     TURMERIC PO Take 1 capsule by  mouth At Bedtime       Social History     Tobacco Use     Smoking status: Former Smoker     Packs/day: 1.00     Years: 30.00     Pack years: 30.00     Smokeless tobacco: Former User     Quit date: 12/6/1961     Tobacco comment: 2 packs per year   Substance Use Topics     Alcohol use: Yes     Alcohol/week: 0.0 standard drinks     Comment: socially       OBJECTIVE  BP (!) 144/76   Pulse 66   Temp 98.7  F (37.1  C) (Tympanic)   SpO2 97%     Physical Exam  Vitals and nursing note reviewed.   Constitutional:       Appearance: Normal appearance. He is normal weight.   Eyes:      Extraocular Movements: Extraocular movements intact.      Conjunctiva/sclera: Conjunctivae normal.   Cardiovascular:      Rate and Rhythm: Normal rate.   Musculoskeletal:      Comments: Ambulating with a can with slight limp.  Pain with external rotation on left hip.  Tenderness to palpation of posterior trochanteric bursa.  No tenderness to back at all or PSIS.    Left knee exam is unremarkable.   Neurological:      General: No focal deficit present.      Mental Status: He is alert.   Psychiatric:         Mood and Affect: Mood normal.         Labs:  No results found for this or any previous visit (from the past 24 hour(s)).    X-Ray was done, my findings are: NAD        ASSESSMENT:      ICD-10-CM    1. Hip pain, left  M25.552 XR Pelvis and Hip Left 2 Views        Medical Decision Making:    Differential Diagnosis:  MS Injury Pain: fracture, muscle strain and osteoarthritis    Serious Comorbid Conditions:  Adult:  reviewed    PLAN:    Continue with cane/walker.  Tylenol/motrin prn.   xrays can be radiographically occult. If there is persistent or worsening pain, it is recommend patient return for repeat xrays in 7-10 days.    Followup:    If not improving or if condition worsens, follow up with your Primary Care Provider, If not improving or if conditions worsens over the next 12-24 hours, go to the Emergency Department    Patient Instructions      Patient Education     Hip Strain    You have a strain of the muscles around the hip joint. A muscle strain is a stretching or tearing of muscle fibers. This causes pain, especially when you move that muscle. There may also be some swelling and bruising.  Home care    Stay off the injured leg as much as possible until you can walk on it without pain. If you have a lot of pain with walking, crutches or a walker may be prescribed. These can be rented or purchased at many pharmacies and surgical or orthopedic supply stores. Follow your healthcare provider's advice about when to start putting weight on that leg.    Apply an ice pack over the injured area for 15 to 20 minutes every 3 to 6 hours. Do this for the first 24 to 48 hours. You can make an ice pack by filling a plastic bag that seals at the top with ice cubes and then wrapping it with a thin towel. Be careful not to injure your skin with the ice treatments. Ice should never be applied directly to skin. Continue the use of ice packs for relief of pain and swelling as needed. After 48 hours, apply heat (warm shower or warm bath) for 15 to 20 minutes several times a day, or alternate ice and heat.    You may use over-the-counter pain medicine to control pain, unless another pain medicine was prescribed. If you have chronic liver or kidney disease or ever had a stomach ulcer or gastrointestinal bleeding, talk with your healthcare provider before using these medicines.    If you play sports, you may resume these activities when you are able to hop and run on the injured leg without pain.  Follow-up care  Follow up with your healthcare provider, or as advised. If your symptoms don't start to get better after a week, more tests may be needed.  If X-rays were taken, you will be told of any new findings that may affect your care.  When to seek medical advice  Call your healthcare provider right away if any of these occur:    Increased swelling or bruising    Increased  pain    Losing the ability to put weight on the injured side  Ty last reviewed this educational content on 5/1/2018 2000-2021 [employer]. All rights reserved. This information is not intended as a substitute for professional medical care. Always follow your healthcare professional's instructions.

## 2022-02-18 ENCOUNTER — OFFICE VISIT (OUTPATIENT)
Dept: FAMILY MEDICINE | Facility: CLINIC | Age: 82
End: 2022-02-18
Payer: MEDICARE

## 2022-02-18 VITALS
BODY MASS INDEX: 24.65 KG/M2 | HEART RATE: 83 BPM | WEIGHT: 162.1 LBS | OXYGEN SATURATION: 99 % | DIASTOLIC BLOOD PRESSURE: 81 MMHG | RESPIRATION RATE: 16 BRPM | TEMPERATURE: 97.3 F | SYSTOLIC BLOOD PRESSURE: 126 MMHG

## 2022-02-18 DIAGNOSIS — L02.215 ABSCESS, PERINEUM: Primary | ICD-10-CM

## 2022-02-18 PROCEDURE — 99213 OFFICE O/P EST LOW 20 MIN: CPT | Performed by: NURSE PRACTITIONER

## 2022-02-18 ASSESSMENT — PAIN SCALES - GENERAL: PAINLEVEL: NO PAIN (0)

## 2022-02-18 NOTE — PATIENT INSTRUCTIONS
Patient Education     Abscess   An abscess happens when bacteria get trapped under the skin and start to grow. Pus forms inside the abscess as the body responds to the bacteria. An abscess can happen with an insect bite, ingrown hair, blocked oil gland, pimple, cyst, or puncture wound. It is sometimes call a boil.   In the early stages, your wound may be red and tender. For this stage, you may get antibiotics. If the abscess does not get better with antibiotics, it will need to be drained with a small cut.   Home care  These tips will help you care for your abscess at home:    Soak the wound in hot water or apply hot packs (small towel soaked in hot water) to the area for 20 minutes at a time. Do this 3 to 4 times a day, or as instructed. Use a new towel each time. Wash the towels afterward because they may be contaminated with bacteria after use.    Don't cut, squeeze, or pop the boil yourself.    You may use an over-the-counter pain medicine to control pain, unless another pain medicine was prescribed. Talk with your provider before taking these medicines if you have chronic liver or kidney disease or ever had a stomach ulcer or digestive bleeding.  Follow-up care  Follow up with your healthcare provider, or as advised. Check your wound each day for the signs that the infection may be getting worse (see below).   When to seek medical advice  Get prompt medical attention if any of these occur:    An increase in redness or swelling    Red streaks in the skin leading away from the abscess    An increase in local pain or swelling    Fever of 100.4 F (38 C) or higher, or as directed by your healthcare provider    Pus or fluid coming from the abscess    Boil returns after getting better  Ty last reviewed this educational content on 8/1/2019 2000-2021 [employer]. All rights reserved. This information is not intended as a substitute for professional medical care. Always follow your healthcare  professional's instructions.

## 2022-02-18 NOTE — PROGRESS NOTES
Assessment & Plan   Problem List Items Addressed This Visit     None      Visit Diagnoses     Abscess, perineum    -  Primary           Tiny improving abscess posterior to the scrotum. Nothing to drain today. Instructed to sit in a hot bath to help with healing. Follow-up if symptoms worsen       RONA Kauffman CNP  [facility] NOEMI Echeverria is a 82 year old who presents for the following health issues     HPI     2 weeks ago noticed a lump right behind the scrotum   It is getting smaller after it popped and drained a little   It is only a little sore       Review of Systems   Detailed as above         Objective    /81 (BP Location: Left arm, Patient Position: Sitting, Cuff Size: Adult Regular)   Pulse 83   Temp 97.3  F (36.3  C) (Temporal)   Resp 16   Wt 73.5 kg (162 lb 1.6 oz)   SpO2 99%   BMI 24.65 kg/m    Body mass index is 24.65 kg/m .  Physical Exam  Constitutional:       Appearance: Normal appearance.   Pulmonary:      Effort: Pulmonary effort is normal.   Genitourinary:     Comments: Tiny pea sized area of induration posterior to scrotum without overlying erythema   Neurological:      Mental Status: He is alert.   Psychiatric:         Mood and Affect: Mood normal.

## 2022-05-30 ENCOUNTER — APPOINTMENT (OUTPATIENT)
Dept: CT IMAGING | Facility: CLINIC | Age: 82
End: 2022-05-30
Attending: EMERGENCY MEDICINE
Payer: MEDICARE

## 2022-05-30 ENCOUNTER — HOSPITAL ENCOUNTER (EMERGENCY)
Facility: CLINIC | Age: 82
Discharge: HOME | End: 2022-05-30
Attending: EMERGENCY MEDICINE | Admitting: EMERGENCY MEDICINE
Payer: MEDICARE

## 2022-05-30 VITALS
OXYGEN SATURATION: 96 % | SYSTOLIC BLOOD PRESSURE: 145 MMHG | HEIGHT: 68 IN | TEMPERATURE: 98.1 F | RESPIRATION RATE: 20 BRPM | DIASTOLIC BLOOD PRESSURE: 78 MMHG | WEIGHT: 160 LBS | HEART RATE: 69 BPM | BODY MASS INDEX: 24.25 KG/M2

## 2022-05-30 DIAGNOSIS — N20.1 URETEROLITHIASIS: ICD-10-CM

## 2022-05-30 DIAGNOSIS — R10.9 FLANK PAIN: ICD-10-CM

## 2022-05-30 LAB
ALBUMIN SERPL-MCNC: 3.8 G/DL (ref 3.4–5)
ALBUMIN UR-MCNC: 10 MG/DL
ALP SERPL-CCNC: 52 U/L (ref 40–150)
ALT SERPL W P-5'-P-CCNC: 36 U/L (ref 0–70)
ANION GAP SERPL CALCULATED.3IONS-SCNC: 6 MMOL/L (ref 3–14)
APPEARANCE UR: CLEAR
AST SERPL W P-5'-P-CCNC: 25 U/L (ref 0–45)
BASOPHILS # BLD AUTO: 0.1 10E3/UL (ref 0–0.2)
BASOPHILS NFR BLD AUTO: 1 %
BILIRUB DIRECT SERPL-MCNC: 0.1 MG/DL (ref 0–0.2)
BILIRUB SERPL-MCNC: 0.9 MG/DL (ref 0.2–1.3)
BILIRUB UR QL STRIP: NEGATIVE
BUN SERPL-MCNC: 28 MG/DL (ref 7–30)
CALCIUM SERPL-MCNC: 9.2 MG/DL (ref 8.5–10.1)
CHLORIDE BLD-SCNC: 109 MMOL/L (ref 94–109)
CO2 SERPL-SCNC: 25 MMOL/L (ref 20–32)
COLOR UR AUTO: YELLOW
CREAT SERPL-MCNC: 1.01 MG/DL (ref 0.66–1.25)
EOSINOPHIL # BLD AUTO: 0.2 10E3/UL (ref 0–0.7)
EOSINOPHIL NFR BLD AUTO: 3 %
ERYTHROCYTE [DISTWIDTH] IN BLOOD BY AUTOMATED COUNT: 13.6 % (ref 10–15)
GFR SERPL CREATININE-BSD FRML MDRD: 74 ML/MIN/1.73M2
GLUCOSE BLD-MCNC: 141 MG/DL (ref 70–99)
GLUCOSE UR STRIP-MCNC: NEGATIVE MG/DL
HCT VFR BLD AUTO: 38.6 % (ref 40–53)
HGB BLD-MCNC: 12.4 G/DL (ref 13.3–17.7)
HGB UR QL STRIP: NEGATIVE
IMM GRANULOCYTES # BLD: 0 10E3/UL
IMM GRANULOCYTES NFR BLD: 0 %
KETONES UR STRIP-MCNC: NEGATIVE MG/DL
LEUKOCYTE ESTERASE UR QL STRIP: NEGATIVE
LIPASE SERPL-CCNC: 83 U/L (ref 73–393)
LYMPHOCYTES # BLD AUTO: 2.3 10E3/UL (ref 0.8–5.3)
LYMPHOCYTES NFR BLD AUTO: 38 %
MCH RBC QN AUTO: 30.5 PG (ref 26.5–33)
MCHC RBC AUTO-ENTMCNC: 32.1 G/DL (ref 31.5–36.5)
MCV RBC AUTO: 95 FL (ref 78–100)
MONOCYTES # BLD AUTO: 0.7 10E3/UL (ref 0–1.3)
MONOCYTES NFR BLD AUTO: 12 %
MUCOUS THREADS #/AREA URNS LPF: PRESENT /LPF
NEUTROPHILS # BLD AUTO: 2.8 10E3/UL (ref 1.6–8.3)
NEUTROPHILS NFR BLD AUTO: 46 %
NITRATE UR QL: NEGATIVE
NRBC # BLD AUTO: 0 10E3/UL
NRBC BLD AUTO-RTO: 0 /100
PH UR STRIP: 6 [PH] (ref 5–7)
PLATELET # BLD AUTO: 265 10E3/UL (ref 150–450)
POTASSIUM BLD-SCNC: 4.2 MMOL/L (ref 3.4–5.3)
PROT SERPL-MCNC: 6.9 G/DL (ref 6.8–8.8)
RBC # BLD AUTO: 4.07 10E6/UL (ref 4.4–5.9)
RBC URINE: 14 /HPF
SODIUM SERPL-SCNC: 140 MMOL/L (ref 133–144)
SP GR UR STRIP: 1.03 (ref 1–1.03)
SQUAMOUS EPITHELIAL: <1 /HPF
UROBILINOGEN UR STRIP-MCNC: NORMAL MG/DL
WBC # BLD AUTO: 6 10E3/UL (ref 4–11)
WBC URINE: 5 /HPF

## 2022-05-30 PROCEDURE — 250N000011 HC RX IP 250 OP 636: Performed by: EMERGENCY MEDICINE

## 2022-05-30 PROCEDURE — 81001 URINALYSIS AUTO W/SCOPE: CPT | Performed by: EMERGENCY MEDICINE

## 2022-05-30 PROCEDURE — 96374 THER/PROPH/DIAG INJ IV PUSH: CPT | Mod: 59

## 2022-05-30 PROCEDURE — 96376 TX/PRO/DX INJ SAME DRUG ADON: CPT

## 2022-05-30 PROCEDURE — 85025 COMPLETE CBC W/AUTO DIFF WBC: CPT | Performed by: EMERGENCY MEDICINE

## 2022-05-30 PROCEDURE — 96375 TX/PRO/DX INJ NEW DRUG ADDON: CPT

## 2022-05-30 PROCEDURE — 74177 CT ABD & PELVIS W/CONTRAST: CPT | Mod: ME

## 2022-05-30 PROCEDURE — G1004 CDSM NDSC: HCPCS

## 2022-05-30 PROCEDURE — 82248 BILIRUBIN DIRECT: CPT | Performed by: EMERGENCY MEDICINE

## 2022-05-30 PROCEDURE — 250N000009 HC RX 250: Performed by: EMERGENCY MEDICINE

## 2022-05-30 PROCEDURE — 82310 ASSAY OF CALCIUM: CPT | Performed by: EMERGENCY MEDICINE

## 2022-05-30 PROCEDURE — 99285 EMERGENCY DEPT VISIT HI MDM: CPT | Mod: 25

## 2022-05-30 PROCEDURE — 36415 COLL VENOUS BLD VENIPUNCTURE: CPT | Performed by: EMERGENCY MEDICINE

## 2022-05-30 PROCEDURE — 83690 ASSAY OF LIPASE: CPT | Performed by: EMERGENCY MEDICINE

## 2022-05-30 RX ORDER — ONDANSETRON 2 MG/ML
4 INJECTION INTRAMUSCULAR; INTRAVENOUS EVERY 30 MIN PRN
Status: DISCONTINUED | OUTPATIENT
Start: 2022-05-30 | End: 2022-05-30 | Stop reason: HOSPADM

## 2022-05-30 RX ORDER — TAMSULOSIN HYDROCHLORIDE 0.4 MG/1
0.4 CAPSULE ORAL DAILY
Qty: 7 CAPSULE | Refills: 0 | Status: SHIPPED | OUTPATIENT
Start: 2022-05-30 | End: 2022-06-06

## 2022-05-30 RX ORDER — HYDROMORPHONE HCL IN WATER/PF 6 MG/30 ML
0.2 PATIENT CONTROLLED ANALGESIA SYRINGE INTRAVENOUS
Status: COMPLETED | OUTPATIENT
Start: 2022-05-30 | End: 2022-05-30

## 2022-05-30 RX ORDER — OXYCODONE HYDROCHLORIDE 5 MG/1
2.5 TABLET ORAL EVERY 6 HOURS PRN
Qty: 6 TABLET | Refills: 0 | Status: SHIPPED | OUTPATIENT
Start: 2022-05-30 | End: 2022-06-02

## 2022-05-30 RX ORDER — KETOROLAC TROMETHAMINE 15 MG/ML
10 INJECTION, SOLUTION INTRAMUSCULAR; INTRAVENOUS ONCE
Status: COMPLETED | OUTPATIENT
Start: 2022-05-30 | End: 2022-05-30

## 2022-05-30 RX ORDER — IOPAMIDOL 755 MG/ML
81 INJECTION, SOLUTION INTRAVASCULAR ONCE
Status: COMPLETED | OUTPATIENT
Start: 2022-05-30 | End: 2022-05-30

## 2022-05-30 RX ADMIN — ONDANSETRON 4 MG: 2 INJECTION INTRAMUSCULAR; INTRAVENOUS at 05:01

## 2022-05-30 RX ADMIN — HYDROMORPHONE HYDROCHLORIDE 0.2 MG: 0.2 INJECTION, SOLUTION INTRAMUSCULAR; INTRAVENOUS; SUBCUTANEOUS at 05:01

## 2022-05-30 RX ADMIN — KETOROLAC TROMETHAMINE 10 MG: 15 INJECTION, SOLUTION INTRAMUSCULAR; INTRAVENOUS at 06:20

## 2022-05-30 RX ADMIN — HYDROMORPHONE HYDROCHLORIDE 0.2 MG: 0.2 INJECTION, SOLUTION INTRAMUSCULAR; INTRAVENOUS; SUBCUTANEOUS at 05:48

## 2022-05-30 RX ADMIN — ONDANSETRON 4 MG: 2 INJECTION INTRAMUSCULAR; INTRAVENOUS at 05:48

## 2022-05-30 RX ADMIN — SODIUM CHLORIDE 63 ML: 900 INJECTION INTRAVENOUS at 05:37

## 2022-05-30 RX ADMIN — HYDROMORPHONE HYDROCHLORIDE 0.2 MG: 0.2 INJECTION, SOLUTION INTRAMUSCULAR; INTRAVENOUS; SUBCUTANEOUS at 05:16

## 2022-05-30 RX ADMIN — IOPAMIDOL 81 ML: 755 INJECTION, SOLUTION INTRAVENOUS at 05:37

## 2022-05-30 NOTE — ED PROVIDER NOTES
"  History   Chief Complaint:  Abdominal Pain     The history is provided by the patient.      Juwan Montoya is a 82 year old male with history of hypertension, bladder stone and nephrolithiasis who presents with abdominal pain. The patient tells us that he woke up around 0300 with a sudden onset of right sided abdominal pain that is now radiating into his right flank/back. In addition, he tells us that he is nauseous and feels like he has to throw up but has not yet. The patient denies vomiting, diarrhea, constipation, fever, chills, cough, dysuria or testicular pain.     To note, the patient tells us that this pain feels like his previous kidney stone.     Review of Systems   All other systems reviewed and are negative.    Allergies:  The patient has no known allergies.     Medications:  Lutein   Prilosec  Tumeric     Past Medical History:     Varicose veins  Bladder stone  Chronic depressive personality disorder  Esophageal reflux  Hepatic cyst  Hypertrophy of prostate  Nephrolithiasis   Cataract   Hypertension     Past Surgical History:    Cystourethroscopy   ORIF right ankle  Removal of hardware right ankle  TURP   Cataract extraction - bilateral   YAG Capsulotomy     Family History:    The patient denies past family history.     Social History:  The patient presents to the ED with his wife   The patient denies alcohol use  The patient denies tobacco use  The patient denies drug use     Physical Exam     Patient Vitals for the past 24 hrs:   BP Temp Temp src Pulse Resp SpO2 Height Weight   05/30/22 0630 (!) 145/78 -- -- 69 -- 96 % -- --   05/30/22 0424 (!) 170/73 98.1  F (36.7  C) Temporal 85 20 99 % 1.727 m (5' 8\") 72.6 kg (160 lb)     Physical Exam    General: Resting on the bed.  Head: No obvious trauma to head.  Ears, Nose, Throat:  External ears normal.  Nose normal.    Eyes:  Conjunctivae clear.  Pupils are equal, round, and reactive.   Neck: Normal range of motion.  Neck supple.   CV: Regular rate " and rhythm.  No murmurs.      Respiratory: Effort normal and breath sounds normal.  No wheezing or crackles.   Gastrointestinal: Soft.  No distension. There is right upper and right lower quadrant tenderness.  There is no rigidity, no rebound and no guarding.   Musculoskeletal: No CVA tenderness  Neuro: Alert. Moving all extremities appropriately.  Normal speech.    Skin: Skin is warm and dry.  No rash noted.       Emergency Department Course     Imaging:  CT Abdomen Pelvis w Contrast   Final Result   IMPRESSION:    1.  3 mm calculus proximal third right ureter. Mild right hydronephrosis.   2.  Bladder diverticuli.   3.  Diverticulosis.        Report per radiology    Laboratory:  Labs Ordered and Resulted from Time of ED Arrival to Time of ED Departure   BASIC METABOLIC PANEL - Abnormal       Result Value    Sodium 140      Potassium 4.2      Chloride 109      Carbon Dioxide (CO2) 25      Anion Gap 6      Urea Nitrogen 28      Creatinine 1.01      Calcium 9.2      Glucose 141 (*)     GFR Estimate 74     ROUTINE UA WITH MICROSCOPIC REFLEX TO CULTURE - Abnormal    Color Urine Yellow      Appearance Urine Clear      Glucose Urine Negative      Bilirubin Urine Negative      Ketones Urine Negative      Specific Gravity Urine 1.026      Blood Urine Negative      pH Urine 6.0      Protein Albumin Urine 10  (*)     Urobilinogen Urine Normal      Nitrite Urine Negative      Leukocyte Esterase Urine Negative      Mucus Urine Present (*)     RBC Urine 14 (*)     WBC Urine 5      Squamous Epithelials Urine <1     CBC WITH PLATELETS AND DIFFERENTIAL - Abnormal    WBC Count 6.0      RBC Count 4.07 (*)     Hemoglobin 12.4 (*)     Hematocrit 38.6 (*)     MCV 95      MCH 30.5      MCHC 32.1      RDW 13.6      Platelet Count 265      % Neutrophils 46      % Lymphocytes 38      % Monocytes 12      % Eosinophils 3      % Basophils 1      % Immature Granulocytes 0      NRBCs per 100 WBC 0      Absolute Neutrophils 2.8      Absolute  Lymphocytes 2.3      Absolute Monocytes 0.7      Absolute Eosinophils 0.2      Absolute Basophils 0.1      Absolute Immature Granulocytes 0.0      Absolute NRBCs 0.0     HEPATIC FUNCTION PANEL - Normal    Bilirubin Total 0.9      Bilirubin Direct 0.1      Protein Total 6.9      Albumin 3.8      Alkaline Phosphatase 52      AST 25      ALT 36     LIPASE - Normal    Lipase 83        Emergency Department Course:    Reviewed:  I reviewed nursing notes, vitals, past medical history, Care Everywhere and [facility]    Assessments:  0451 I obtained history and examined the patient as noted above.   0622 I rechecked the patient and explained findings.     Interventions:  0501 Dilaudid 0.2 mg IV  0516 Dilaudid 0.2 mg IV  0537 Iopamidol 81 mL IV  0548 Dilaudid 0.2 mg IV  0548 Zofran 4 mg IV x 3   0620 Toradol 10 mg IV     Disposition:  The patient was discharged to home.     Impression & Plan     Medical Decision Making:  The patient presented with unilateral flank and abdominal pain consistent with renal colic.  Patient initially hypertensive but improved with pain control.  Broad differential was pursued including not limited to AAA, pyelonephritis, nephrolithiasis, colitis, diverticulitis, appendicitis, obstruction, etc.  CT confirms a ureteral stone.  CBC without leukocytosis or anemia.  BMP without acute electrolyte, metabolic or renal dysfunction.  LFTs and lipase are reassuring, not concerning for obstructive biliary process, hepatitis, pancreatitis.  UA with evidence of blood but no signs of infection.  Pain is controlled with interventions in the Emergency Department. There is no fever or evidence of a urinary tract infection. The patient will be discharged with opioid analgesics and Ibuprofen for pain. Flomax will be prescribed daily to attempt to ease stone passage.   I considered other etiologies for these symptoms including AAA and pyelonephritis but these are unlikely given the otherwise normal CT scan and urinalysis.   The patient is instructed to return if increasing pain not controlled with pain meds, vomiting, and fever. Strain urine to look for stone, if detected, submit to primary doctor for lab analysis. Instructions were given to follow up with urology within one week, sooner if pain continues, as retrieval of the stone may be required for refractory symptoms.     Diagnosis:    ICD-10-CM    1. Ureterolithiasis  N20.1    2. Flank pain  R10.9        Discharge Medications:  Discharge Medication List as of 5/30/2022  7:21 AM      START taking these medications    Details   oxyCODONE (ROXICODONE) 5 MG tablet Take 0.5 tablets (2.5 mg) by mouth every 6 hours as needed for severe pain, Disp-6 tablet, R-0, E-Prescribe      tamsulosin (FLOMAX) 0.4 MG capsule Take 1 capsule (0.4 mg) by mouth daily for 7 days, Disp-7 capsule, R-0, E-Prescribe           Scribe Disclosure:  Calvin BREWER, am serving as a scribe at 4:43 AM on 5/30/2022 to document services personally performed by Tiffany Jamison MD, based on my observations and the provider's statements to me.            Tiffany Jamison MD  05/30/22 [id number]

## 2022-05-30 NOTE — ED TRIAGE NOTES
[facility]  ED Arrival Note    Arrived to triage c/o R sided abdominal pain that woke him up from sleep at 0300. Pt also c/o nausea. Hx of kidney stones.    Visitors during triage: None      Triage Interventions: N/A    Ambulatory: Yes    Directed to: Main ED         Triage Assessment     Row Name 05/30/22 0428       Triage Assessment (Adult)    Airway WDL WDL       Respiratory WDL    Respiratory WDL WDL       Cardiac WDL    Cardiac WDL WDL       Peripheral/Neurovascular WDL    Peripheral Neurovascular WDL WDL       Cognitive/Neuro/Behavioral WDL    Cognitive/Neuro/Behavioral WDL WDL

## 2022-06-10 ENCOUNTER — NURSE TRIAGE (OUTPATIENT)
Dept: FAMILY MEDICINE | Facility: CLINIC | Age: 82
End: 2022-06-10
Payer: MEDICARE

## 2022-06-10 NOTE — TELEPHONE ENCOUNTER
"Patient seen in ED 5/30 for right sided kidney stones. Patient passed stone 1 week ago.    Patient having left sided flank pain starting 1 week ago  Intermittent 3/10 non-radiating pain  Denies pain/burning with urination  No blood in urine  Denies fever  Wakes up in early AM with night sweats/heat flashes       Facial pain has been discussed with PCP in past, approximately 5 yrs ago.   Pain has started again at same time of Kidney stone incident 5/30   Taking 650mg tylenol- helps for the day.  Rates 10/10 sharp stabbing pain/ Intermittent   Goes up let side of face closer to ear along skull-top of left side of head is sensitive when this occurs. Top of ear is sensitive.  Will wake up in early AM with night sweats/heat flash  Has Hx of Trigeminal Neuralgia noted 2013        Per disposition, patient needs to be seen today or tomorrow in office.   Due to availability, patient is advised to go to UC/ED or to [facility] walk-in clinic. Patient declines and requests PCP recommendations.Patient is more concerned with facial pain that has increased with frequency with recent kidney stone incidents.        1. LOCATION: \"Where does it hurt?\" (e.g., left, right)      Left side  2. ONSET: \"When did the pain start?\"      1 week ago  3. SEVERITY: \"How bad is the pain?\" (e.g., Scale 1-10; mild, moderate, or severe)    - MILD (1-3): doesn't interfere with normal activities     - MODERATE (4-7): interferes with normal activities or awakens from sleep     - SEVERE (8-10): excruciating pain and patient unable to do normal activities (stays in bed)        3/10 Intermittent  4. PATTERN: \"Does the pain come and go, or is it constant?\"       Intermittent  5. CAUSE: \"What do you think is causing the pain?\"      Kidney Stone  6. OTHER SYMPTOMS:  \"Do you have any other symptoms?\" (e.g., fever, abdominal pain, vomiting, leg weakness, burning with urination, blood in urine)      Will wake up in early AM with night sweats/heat flash      Denies " "pain/burning/blood in urine    Reason for Disposition    Patient wants to be seen    Additional Information    Negative: Shock suspected (e.g., cold/pale/clammy skin, too weak to stand, low BP, rapid pulse)    Negative: Similar pain previously and it was from 'heart attack'    Negative: Similar pain previously and it was from 'angina' and not relieved by nitroglycerin    Negative: Sounds like a life-threatening emergency to the triager    Negative: Chest pain    Negative: Sinus pain and congestion    Negative: Headache or pain in upper forehead    Negative: Toothache is the main symptom    Negative: Followed a face injury    Negative: Difficulty breathing or unusual sweating (e.g., sweating without exertion)    Negative: Can't close the mouth fully (i.e., \"mouth is locked open\", patient will have difficulty talking)    Negative: Fever and localized red rash    Negative: Fever and area of face is swollen    Negative: New onset jaw pain of unknown cause AND at least one cardiac risk factor (i.e., hypertension, diabetes, obesity, smoker or strong family history of heart disease)    Negative: Patient sounds very sick or weak to the triager    Negative: SEVERE pain (e.g., excruciating, unable to do any normal activities)    Negative: Localized red rash and painful to the touch    Negative: Painful rash with multiple small blisters grouped together (i.e., dermatomal distribution or 'band' or 'stripe')    Negative: Swollen area of face and toothache    Negative: Swollen area of face that is painful to touch    Negative: Swelling around the eye    Negative: Face pain present > 24 hours    Negative: Looks like a boil or infected sore    Negative: Fever present > 3 days (72 hours)    Negative: Patient wants to be seen    Negative: Passed out (i.e., lost consciousness, collapsed and was not responding)    Negative: Shock suspected (e.g., cold/pale/clammy skin, too weak to stand, low BP, rapid pulse)    Negative: Sounds like a " life-threatening emergency to the triager    Negative: Followed a major injury to the back (e.g., MVA, fall > 10 feet or 3 meters, penetrating injury, etc.)    Negative: Upper, mid or lower back pain that occurs mainly in the midline    Negative: SEVERE pain (e.g., excruciating, scale 8-10) and present > 1 hour    Negative: Sudden onset of severe flank pain and age > 60    Negative: Abdominal pain and age > 60    Negative: Unable to urinate (or only a few drops) > 4 hours and bladder feels very full (e.g., palpable bladder or strong urge to urinate)    Negative: Pain radiates into groin, scrotum    Negative: Blood in urine (red, pink, or tea-colored)    Negative: Vomiting    Negative: Weakness of a leg or foot (e.g., unable to bear weight, dragging foot)    Negative: Patient sounds very sick or weak to the triager    Negative: Fever > 100.4 F (38.0 C)    Negative: Pain or burning with passing urine (urination)    Negative: MODERATE pain (e.g., interferes with normal activities or awakens from sleep)    Negative: Painful rash with multiple small blisters grouped together (i.e., dermatomal distribution or 'band' or 'stripe')    Negative: Pregnant (Exception: mild pain that is only present with movement)    Negative: Diabetes mellitus or weak immune system (e.g., HIV positive, cancer chemo, splenectomy, organ transplant, chronic steroids) (Exception: mild pain that is only present with movement)    Protocols used: FLANK PAIN-A-OH, FACE PAIN-A-OH

## 2022-06-10 NOTE — TELEPHONE ENCOUNTER
I spoke to Juwan Montoya and symptoms of trigeminal neuralgia have settled down today.  Will continue acetaminophen as needed     Has had this off and on over the past 9 years and did see neurology for this in the past.    Will follow up in urology for kidney stone

## 2022-08-03 ENCOUNTER — OFFICE VISIT (OUTPATIENT)
Dept: FAMILY MEDICINE | Facility: CLINIC | Age: 82
End: 2022-08-03
Payer: MEDICARE

## 2022-08-03 VITALS
HEART RATE: 70 BPM | OXYGEN SATURATION: 96 % | SYSTOLIC BLOOD PRESSURE: 133 MMHG | HEIGHT: 68 IN | RESPIRATION RATE: 18 BRPM | WEIGHT: 159 LBS | TEMPERATURE: 97.1 F | DIASTOLIC BLOOD PRESSURE: 75 MMHG | BODY MASS INDEX: 24.1 KG/M2

## 2022-08-03 DIAGNOSIS — D64.9 ANEMIA, UNSPECIFIED TYPE: ICD-10-CM

## 2022-08-03 DIAGNOSIS — Z90.79 S/P TURP: ICD-10-CM

## 2022-08-03 DIAGNOSIS — Z13.6 CARDIOVASCULAR SCREENING; LDL GOAL LESS THAN 130: ICD-10-CM

## 2022-08-03 DIAGNOSIS — Z12.11 SCREEN FOR COLON CANCER: ICD-10-CM

## 2022-08-03 DIAGNOSIS — N20.0 NEPHROLITHIASIS: ICD-10-CM

## 2022-08-03 LAB
ERYTHROCYTE [DISTWIDTH] IN BLOOD BY AUTOMATED COUNT: 13.9 % (ref 10–15)
HCT VFR BLD AUTO: 37.9 % (ref 40–53)
HGB BLD-MCNC: 12 G/DL (ref 13.3–17.7)
IRON SATN MFR SERPL: 29 % (ref 15–46)
IRON SERPL-MCNC: 112 UG/DL (ref 35–180)
MCH RBC QN AUTO: 30.1 PG (ref 26.5–33)
MCHC RBC AUTO-ENTMCNC: 31.7 G/DL (ref 31.5–36.5)
MCV RBC AUTO: 95 FL (ref 78–100)
PLATELET # BLD AUTO: 233 10E3/UL (ref 150–450)
RBC # BLD AUTO: 3.99 10E6/UL (ref 4.4–5.9)
TIBC SERPL-MCNC: 380 UG/DL (ref 240–430)
WBC # BLD AUTO: 5 10E3/UL (ref 4–11)

## 2022-08-03 PROCEDURE — 82607 VITAMIN B-12: CPT | Performed by: INTERNAL MEDICINE

## 2022-08-03 PROCEDURE — 85027 COMPLETE CBC AUTOMATED: CPT | Performed by: INTERNAL MEDICINE

## 2022-08-03 PROCEDURE — 80061 LIPID PANEL: CPT | Performed by: INTERNAL MEDICINE

## 2022-08-03 PROCEDURE — 82728 ASSAY OF FERRITIN: CPT | Performed by: INTERNAL MEDICINE

## 2022-08-03 PROCEDURE — 82746 ASSAY OF FOLIC ACID SERUM: CPT | Performed by: INTERNAL MEDICINE

## 2022-08-03 PROCEDURE — 99214 OFFICE O/P EST MOD 30 MIN: CPT | Performed by: INTERNAL MEDICINE

## 2022-08-03 PROCEDURE — 80053 COMPREHEN METABOLIC PANEL: CPT | Performed by: INTERNAL MEDICINE

## 2022-08-03 PROCEDURE — 36415 COLL VENOUS BLD VENIPUNCTURE: CPT | Performed by: INTERNAL MEDICINE

## 2022-08-03 PROCEDURE — 83550 IRON BINDING TEST: CPT | Performed by: INTERNAL MEDICINE

## 2022-08-03 ASSESSMENT — PAIN SCALES - GENERAL: PAINLEVEL: NO PAIN (0)

## 2022-08-03 NOTE — PATIENT INSTRUCTIONS
(Z12.11) Screen for colon cancer  Comment: Recommend colonoscopy  - declined  Plan:     (Z90.79) S/P TURP  Comment: Doing stable  Plan:     (D64.9) Anemia, unspecified type  Comment: check labs today - as above colonoscopy has been recommended   Plan: Comprehensive metabolic panel, CBC with         platelets, Vitamin B12, Ferritin, Folate, Iron         & Iron Binding Capacity            (Z13.6) CARDIOVASCULAR SCREENING; LDL GOAL LESS THAN 130  Comment: check fasting lipid panel   Plan: Lipid panel reflex to direct LDL Fasting            (N20.0) Nephrolithiasis  Comment: check labs today   Plan: Comprehensive metabolic panel             Shingrix vaccine is now available.  I would call your insurance to see if a shingles vaccine is covered and get this at your pharmacy

## 2022-08-03 NOTE — PROGRESS NOTES
"      Subjective   Juwan is a 82 year old, presenting for the following health issues:  Kidney Problem and Headache      HPI   Anemia  Nephrolithiasis   Juwan Montoya presents to the clinic today for follow up of anemia and recent kidney stone.  He had stone attack that caused pain in the right flank.  Had 3 mm stone that he passed without difficulty.  He did not see a urologist.  He developed some left side pain as well.  He no longer has any pain.  No blood in the urine.  Last office visit in urology was in 2012.  Taking prostate supplement    Review of Systems   Constitutional, HEENT, cardiovascular, pulmonary, GI, , musculoskeletal, neuro, skin, endocrine and psych systems are negative, except as otherwise noted.      Objective    /75   Pulse 70   Temp 97.1  F (36.2  C) (Temporal)   Resp 18   Ht 1.727 m (5' 8\")   Wt 72.1 kg (159 lb)   SpO2 96%   BMI 24.18 kg/m    Body mass index is 24.18 kg/m .  Physical Exam   GENERAL: healthy, alert and no distress  EYES: Eyes grossly normal to inspection,    HENT: ear canals and TM's normal, nose and mouth without ulcers or lesions  NECK: no adenopathy, no asymmetry,   RESP: lungs clear to auscultation - no rales, rhonchi or wheezes  CV: regular rate and rhythm, normal S1 S2, no S3 or S4, no murmur, click or rub, no peripheral edema    ABDOMEN: soft, nontender, no hepatosplenomegaly, no masses and bowel sounds normal  : negative flank tenderness bilaterally   MS: no gross musculoskeletal defects noted, no edema  SKIN: no suspicious lesions or rashes  NEURO: Normal strength and tone, mentation intact and speech normal  PSYCH: mentation appears normal, affect normal/bright    Recent Results (from the past 240 hour(s))   CBC with platelets    Collection Time: 08/03/22  3:58 PM   Result Value Ref Range    WBC Count 5.0 4.0 - 11.0 10e3/uL    RBC Count 3.99 (L) 4.40 - 5.90 10e6/uL    Hemoglobin 12.0 (L) 13.3 - 17.7 g/dL    Hematocrit 37.9 (L) 40.0 - 53.0 %    " MCV 95 78 - 100 fL    MCH 30.1 26.5 - 33.0 pg    MCHC 31.7 31.5 - 36.5 g/dL    RDW 13.9 10.0 - 15.0 %    Platelet Count 233 150 - 450 10e3/uL         Patient Instructions   (Z12.11) Screen for colon cancer  Comment: Recommend colonoscopy  - declined  Plan:     (Z90.79) S/P TURP  Comment: Doing stable  Plan:     (D64.9) Anemia, unspecified type  Comment: check labs today - as above colonoscopy has been recommended   Plan: Comprehensive metabolic panel, CBC with         platelets, Vitamin B12, Ferritin, Folate, Iron         & Iron Binding Capacity            (Z13.6) CARDIOVASCULAR SCREENING; LDL GOAL LESS THAN 130  Comment: check fasting lipid panel   Plan: Lipid panel reflex to direct LDL Fasting            (N20.0) Nephrolithiasis  Comment: check labs today   Plan: Comprehensive metabolic panel             Shingrix vaccine is now available.  I would call your insurance to see if a shingles vaccine is covered and get this at your pharmacy                .  ..

## 2022-08-03 NOTE — LETTER
August 4, 2022      Juwan Montoya  1946 [address] [facility] [id number]        Dear ,    We are writing to inform you of your test results.    Jarrod Echeverria,     I had the opportunity to review your recent labs and a summary of your labs reads as follows:     Your complete blood counts show persistent anemia, normal white blood cell count and platelets.   Your iron studies returned within normal limits, and we are awaiting the results of your vitamin B12 and folic acid levels.  I would still recommend colonoscopy as we discussed   Your comprehensive metabolic panel showed normal renal function, normal liver function, and normal fasting blood glucose indicating no evidence of diabetes mellitus.   Your fasting lipid panel show   - normal HDL (good) cholesterol -as your goal is greater than 40   - low LDL (bad) cholesterol as your goal is less than 100   - normal triglyceride levels       Resulted Orders   Lipid panel reflex to direct LDL Fasting   Result Value Ref Range    Cholesterol 158 <200 mg/dL    Triglycerides 60 <150 mg/dL    Direct Measure HDL 59 >=40 mg/dL    LDL Cholesterol Calculated 87 <=100 mg/dL    Non HDL Cholesterol 99 <130 mg/dL    Patient Fasting > 8hrs? No     Narrative    Cholesterol  Desirable:  <200 mg/dL    Triglycerides  Normal:  Less than 150 mg/dL  Borderline High:  150-199 mg/dL  High:  200-499 mg/dL  Very High:  Greater than or equal to 500 mg/dL    Direct Measure HDL  Female:  Greater than or equal to 50 mg/dL   Male:  Greater than or equal to 40 mg/dL    LDL Cholesterol  Desirable:  <100mg/dL  Above Desirable:  100-129 mg/dL   Borderline High:  130-159 mg/dL   High:  160-189 mg/dL   Very High:  >= 190 mg/dL    Non HDL Cholesterol  Desirable:  130 mg/dL  Above Desirable:  130-159 mg/dL  Borderline High:  160-189 mg/dL  High:  190-219 mg/dL  Very High:  Greater than or equal to 220 mg/dL   Comprehensive metabolic panel   Result Value Ref Range    Sodium 143 133 - 144  mmol/L    Potassium 4.8 3.4 - 5.3 mmol/L    Chloride 110 (H) 94 - 109 mmol/L    Carbon Dioxide (CO2) 24 20 - 32 mmol/L    Anion Gap 9 3 - 14 mmol/L    Urea Nitrogen 16 7 - 30 mg/dL    Creatinine 0.72 0.66 - 1.25 mg/dL    Calcium 9.4 8.5 - 10.1 mg/dL    Glucose 99 70 - 99 mg/dL    Alkaline Phosphatase 53 40 - 150 U/L    AST 25 0 - 45 U/L    ALT 35 0 - 70 U/L    Protein Total 7.0 6.8 - 8.8 g/dL    Albumin 4.1 3.4 - 5.0 g/dL    Bilirubin Total 0.5 0.2 - 1.3 mg/dL    GFR Estimate >90 >60 mL/min/1.73m2      Comment:      Effective December 21, 2021 eGFRcr in adults is calculated using the 2021 CKD-EPI creatinine equation which includes age and gender (Malik frost al., [facility], DOI: 10.1056/GIIEft4638682)   CBC with platelets   Result Value Ref Range    WBC Count 5.0 4.0 - 11.0 10e3/uL    RBC Count 3.99 (L) 4.40 - 5.90 10e6/uL    Hemoglobin 12.0 (L) 13.3 - 17.7 g/dL    Hematocrit 37.9 (L) 40.0 - 53.0 %    MCV 95 78 - 100 fL    MCH 30.1 26.5 - 33.0 pg    MCHC 31.7 31.5 - 36.5 g/dL    RDW 13.9 10.0 - 15.0 %    Platelet Count 233 150 - 450 10e3/uL   Ferritin   Result Value Ref Range    Ferritin 94 26 - 388 ng/mL   Iron & Iron Binding Capacity   Result Value Ref Range    Iron 112 35 - 180 ug/dL    Iron Binding Capacity 380 240 - 430 ug/dL    Iron Sat Index 29 15 - 46 %       If you have any questions or concerns, please call the clinic at the number listed above.       Sincerely,      Dipesh Oviedo MD

## 2022-08-04 LAB
ALBUMIN SERPL-MCNC: 4.1 G/DL (ref 3.4–5)
ALP SERPL-CCNC: 53 U/L (ref 40–150)
ALT SERPL W P-5'-P-CCNC: 35 U/L (ref 0–70)
ANION GAP SERPL CALCULATED.3IONS-SCNC: 9 MMOL/L (ref 3–14)
AST SERPL W P-5'-P-CCNC: 25 U/L (ref 0–45)
BILIRUB SERPL-MCNC: 0.5 MG/DL (ref 0.2–1.3)
BUN SERPL-MCNC: 16 MG/DL (ref 7–30)
CALCIUM SERPL-MCNC: 9.4 MG/DL (ref 8.5–10.1)
CHLORIDE BLD-SCNC: 110 MMOL/L (ref 94–109)
CHOLEST SERPL-MCNC: 158 MG/DL
CO2 SERPL-SCNC: 24 MMOL/L (ref 20–32)
CREAT SERPL-MCNC: 0.72 MG/DL (ref 0.66–1.25)
FASTING STATUS PATIENT QL REPORTED: NO
FERRITIN SERPL-MCNC: 94 NG/ML (ref 26–388)
FOLATE SERPL-MCNC: 12.3 NG/ML (ref 4.6–34.8)
GFR SERPL CREATININE-BSD FRML MDRD: >90 ML/MIN/1.73M2
GLUCOSE BLD-MCNC: 99 MG/DL (ref 70–99)
HDLC SERPL-MCNC: 59 MG/DL
LDLC SERPL CALC-MCNC: 87 MG/DL
NONHDLC SERPL-MCNC: 99 MG/DL
POTASSIUM BLD-SCNC: 4.8 MMOL/L (ref 3.4–5.3)
PROT SERPL-MCNC: 7 G/DL (ref 6.8–8.8)
SODIUM SERPL-SCNC: 143 MMOL/L (ref 133–144)
TRIGL SERPL-MCNC: 60 MG/DL
VIT B12 SERPL-MCNC: 317 PG/ML (ref 232–1245)

## 2022-08-04 NOTE — RESULT ENCOUNTER NOTE
Jarrod Echeverria,    I had the opportunity to review your recent labs and a summary of your labs reads as follows:    Your complete blood counts show persistent anemia, normal white blood cell count and platelets.  Your iron studies returned within normal limits, and we are awaiting the results of your vitamin B12 and folic acid levels.  I would still recommend colonoscopy as we discussed  Your comprehensive metabolic panel showed normal renal function, normal liver function, and normal fasting blood glucose indicating no evidence of diabetes mellitus.  Your fasting lipid panel show  - normal HDL (good) cholesterol -as your goal is greater than 40  - low LDL (bad) cholesterol as your goal is less than 100  - normal triglyceride levels      Sincerely,  Dipesh Oviedo MD

## 2022-10-07 ENCOUNTER — OFFICE VISIT (OUTPATIENT)
Dept: URGENT CARE | Facility: URGENT CARE | Age: 82
End: 2022-10-07
Payer: MEDICARE

## 2022-10-07 VITALS
OXYGEN SATURATION: 95 % | TEMPERATURE: 97.1 F | SYSTOLIC BLOOD PRESSURE: 152 MMHG | HEART RATE: 58 BPM | DIASTOLIC BLOOD PRESSURE: 82 MMHG

## 2022-10-07 DIAGNOSIS — H66.002 NON-RECURRENT ACUTE SUPPURATIVE OTITIS MEDIA OF LEFT EAR WITHOUT SPONTANEOUS RUPTURE OF TYMPANIC MEMBRANE: Primary | ICD-10-CM

## 2022-10-07 PROCEDURE — 99213 OFFICE O/P EST LOW 20 MIN: CPT | Performed by: PHYSICIAN ASSISTANT

## 2022-10-07 RX ORDER — AMOXICILLIN 875 MG
875 TABLET ORAL 2 TIMES DAILY
Qty: 20 TABLET | Refills: 0 | Status: SHIPPED | OUTPATIENT
Start: 2022-10-07 | End: 2022-10-17

## 2022-10-08 NOTE — PROGRESS NOTES
SUBJECTIVE:   Juwan Montoya is a 82 year old male presenting with a chief complaint of   Chief Complaint   Patient presents with     Urgent Care     Left ear plugged x 2 week       He is an established patient of [facility].  Patient presents with left ear decreased hearing.  States recently had the other ear cleaned out.  States he had a URI recently and is just getting over it.          Review of Systems   HENT: Positive for ear pain and hearing loss.    All other systems reviewed and are negative.      Past Medical History:   Diagnosis Date     Asymptomatic varicose veins      Bladder stone      Chronic depressive personality disorder      Esophageal reflux      Hepatic cyst      Hypertrophy (benign) of prostate 9/94    turp operation     Nephrolithiasis      S/P TURP      Unspecified essential hypertension      Family History   Problem Relation Age of Onset     Family History Negative Father      Current Outpatient Medications   Medication Sig Dispense Refill     amoxicillin (AMOXIL) 875 MG tablet Take 1 tablet (875 mg) by mouth 2 times daily for 10 days 20 tablet 0     Lutein 40 MG CAPS Take 40 mg by mouth At Bedtime       PRILOSEC OTC 20 MG OR TBEC 1 TABLET EVERY OTHER DAY AT NIGHT 14 0     Saw [place], [place]ren[place] repens, 320 MG CAPS Take 1 capsule by mouth daily       TURMERIC PO Take 1 capsule by mouth At Bedtime       Social History     Tobacco Use     Smoking status: Former Smoker     Packs/day: 1.00     Years: 30.00     Pack years: 30.00     Smokeless tobacco: Former User     Quit date: 12/6/1961     Tobacco comment: 2 packs per year   Substance Use Topics     Alcohol use: Yes     Alcohol/week: 0.0 standard drinks     Comment: socially       OBJECTIVE  BP (!) 152/82 (BP Location: Left arm, Patient Position: Sitting, Cuff Size: Adult Regular)   Pulse 58   Temp 97.1  F (36.2  C) (Oral)   SpO2 95%     Physical Exam  Vitals and nursing note reviewed.   Constitutional:       Appearance: Normal appearance.  He is normal weight.   HENT:      Head: Normocephalic and atraumatic.      Right Ear: Tympanic membrane, ear canal and external ear normal.      Left Ear: Ear canal and external ear normal.      Ears:      Comments: Left TM erythematous and bulging.  Eyes:      Extraocular Movements: Extraocular movements intact.      Conjunctiva/sclera: Conjunctivae normal.   Cardiovascular:      Rate and Rhythm: Normal rate.   Neurological:      Mental Status: He is alert.         Labs:  No results found for this or any previous visit (from the past 24 hour(s)).    X-Ray was not done.    ASSESSMENT:      ICD-10-CM    1. Non-recurrent acute suppurative otitis media of left ear without spontaneous rupture of tympanic membrane  H66.002 amoxicillin (AMOXIL) 875 MG tablet        Medical Decision Making:    Differential Diagnosis:  URI Adult/Peds:  Acute right otitis media, Otitis externa and cerumen impaction    Serious Comorbid Conditions:  Adult:  reviewed    PLAN:    Rx for amoxicillin.  Patient education.  Discussed reasons to seek immediate medical attention.        Followup:    If not improving or if condition worsens, follow up with your Primary Care Provider, If not improving or if conditions worsens over the next 12-24 hours, go to the Emergency Department    There are no Patient Instructions on file for this visit.

## 2023-12-15 ENCOUNTER — HOSPITAL ENCOUNTER (EMERGENCY)
Facility: CLINIC | Age: 83
Discharge: HOME | End: 2023-12-15
Attending: STUDENT IN AN ORGANIZED HEALTH CARE EDUCATION/TRAINING PROGRAM | Admitting: STUDENT IN AN ORGANIZED HEALTH CARE EDUCATION/TRAINING PROGRAM
Payer: MEDICARE

## 2023-12-15 ENCOUNTER — APPOINTMENT (OUTPATIENT)
Dept: GENERAL RADIOLOGY | Facility: CLINIC | Age: 83
End: 2023-12-15
Attending: STUDENT IN AN ORGANIZED HEALTH CARE EDUCATION/TRAINING PROGRAM
Payer: MEDICARE

## 2023-12-15 VITALS
SYSTOLIC BLOOD PRESSURE: 138 MMHG | WEIGHT: 157 LBS | HEART RATE: 73 BPM | HEIGHT: 68 IN | TEMPERATURE: 97.9 F | OXYGEN SATURATION: 99 % | BODY MASS INDEX: 23.79 KG/M2 | DIASTOLIC BLOOD PRESSURE: 90 MMHG | RESPIRATION RATE: 18 BRPM

## 2023-12-15 DIAGNOSIS — S92.352A CLOSED DISPLACED FRACTURE OF FIFTH METATARSAL BONE OF LEFT FOOT, INITIAL ENCOUNTER: ICD-10-CM

## 2023-12-15 PROCEDURE — 73610 X-RAY EXAM OF ANKLE: CPT | Mod: LT

## 2023-12-15 PROCEDURE — 28470 CLTX METATARSAL FX WO MNP EA: CPT | Mod: LT

## 2023-12-15 PROCEDURE — 99284 EMERGENCY DEPT VISIT MOD MDM: CPT | Mod: 25

## 2023-12-15 PROCEDURE — 73630 X-RAY EXAM OF FOOT: CPT | Mod: LT

## 2023-12-15 ASSESSMENT — ACTIVITIES OF DAILY LIVING (ADL): ADLS_ACUITY_SCORE: 35

## 2023-12-15 NOTE — ED PROVIDER NOTES
"  History     Chief Complaint:  Foot Pain       HPI   Juwan Montoya is a 83 year old male presents with left foot pain.  Denies prior left foot surgeries.  Patient was walking down the stairs when he tripped and fell backwards down 4 steps.  He hit his elbow.  He did not hit his head or lose consciousness.  He denies headache, neck pain, chest pain, abdominal pain or any other pain other than pain in his left foot and ankle.  He is ambulatory without assistance.  Not anticoagulated.  Denies new numbness.  At baseline he states he does have peripheral neuropathy.      Independent Historian:    None - Patient Only    Review of External Notes:  none    Allergies:  No Known Allergies     Physical Exam   Patient Vitals for the past 24 hrs:   BP Temp Temp src Pulse Resp SpO2 Height Weight   12/15/23 1436 (!) 149/67 -- -- -- -- -- -- --   12/15/23 1433 -- 97.9  F (36.6  C) Temporal 65 18 98 % 1.727 m (5' 8\") 71.2 kg (157 lb)        Physical Exam  GENERAL: Patient well-appearing  HEAD: Atraumatic. No roach sign, raccoon eyes or CSF leak  Eyes: Anicteric. PERRL  NOSE: No active bleeding  MOUTH: Moist mucosa  THROAT: Patent airway.    Neck: No rigidity  Back: No midline spinal tenderness, crepitus or gross deformity  CV: RRR, no murmurs rubs or gallops  PULM: CTAB with good aeration; no retractions, rales, rhonchi, or wheezing  ABD: Soft, nontender, nondistended, no guarding, no peritoneal signs, no bruising  DERM: No rash. Skin warm and dry.  Abrasion left elbow.  EXTREMITY: Moving all extremities.  Tenderness over fifth metatarsal without open wound.  Mild tenderness left distal ankle.  No other extremity tenderness over the left foot and ankle.  Leg compartments soft.  VASCULAR: Left posterior tibialis pulse 2+.  NEURO: A,Ox3. CN 2-12 fully intact. Strength 5/5 bilateral LE/UE. Sensation fully intact to light touch symmetrically bilateral LE/UE.  Ambulates without assistance.      Emergency Department Course    "         Laboratory: Imaging:   Labs Ordered and Resulted from Time of ED Arrival to Time of ED Departure - No data to display  Foot XR, G/E 3 views, left   Final Result   IMPRESSION: Acute mildly displaced fracture of the distal fifth   metatarsal diaphysis. No additional fractures are evident. Normal   joint spacing.       EVY ARCHULETA MD            SYSTEM ID:  [id number]      XR Ankle Left G/E 3 Views   Final Result   IMPRESSION: No fractures are identified. The ankle mortise is intact.   The talar dome is unremarkable. Soft tissue swelling in the lower leg   and ankle.       EVY ARCHULETA MD            SYSTEM ID:  [id number]                   Emergency Department Course & Assessments:             Interventions:  Medications - No data to display     Assessments, Independent Interpretation, Consult/Discussion of ManagementTests:   X-ray left foot showing metatarsal shaft fracture.    Social Determinants of Health affecting care:  None    Disposition:  The patient was discharged to home.     Impression & Plan         Medical Decision Making:  Symptoms most consistent with left fifth metatarsal shaft fracture.     Vital signs unremarkable. Neurovascularly intact.     DDx: dislocation, neurovascular injury, compartment syndrome, however evaluation not consistent with these etiologies.    Patient did fall back down 4 steps, however denies any head strike.  He has no torso tenderness.  He has no other complaints other than left foot or ankle pain.  Therefore do not think he requires trauma activation.  Do not think he requires CT imaging.    No open fracture.    Independently interpreted x-ray demonstrating fracture.    Placed in walking boot and ordered crutches.  Patient states he has crutches for home.    Provided information for [facility] orthopedics for follow-up.  Patient has ibuprofen and Tylenol for home which he states he will take for pain.  Do not think he requires narcotics.    Discussed no  weight-bearing.     Patient was evaluated for acute medical emergencies. Based on my clinical assessment, I do not think any further acute management or work-up is required.  Patient stable for discharge. Given strict return precautions. All questions answered. Patient content with plan. Recommended [facility] follow-up within the next couple weeks.      Diagnosis:    ICD-10-CM    1. Closed displaced fracture of fifth metatarsal bone of left foot, initial encounter  S92.352A Crutches Order     Ankle/Foot Bracing Supplies DME Walking Boot; Left; Pneumatic           Discharge Medications:  New Prescriptions    No medications on file          12/15/2023   Daniel Cisneros MD Foss, Kevin, MD  12/15/23 [id number]

## 2023-12-15 NOTE — DISCHARGE INSTRUCTIONS
Return to the emergency department if symptoms are worsening, become concerning, or for any other concerns. Follow-up with [facility] orthopedics within the next 1 to 2 weeks.

## 2024-02-02 ENCOUNTER — NURSE TRIAGE (OUTPATIENT)
Dept: FAMILY MEDICINE | Facility: CLINIC | Age: 84
End: 2024-02-02
Payer: MEDICARE

## 2024-02-02 NOTE — TELEPHONE ENCOUNTER
Reason for Call:  Appointment Request    Patient requesting this type of appt:  office vist    Requested provider: Dipesh Oviedo    Reason patient unable to be scheduled: Not within requested timeframe    When does patient want to be seen/preferred time: 3-7 days    Comments: Pt looking to see Dr Oviedo, preferably, asap. He states they discussed in the past a left side abdominal pain that comes and goes, an ongoing issue. He said it has been more persistent lately and would like to follow up on this.    Okay to leave a detailed message?: Yes at Cell number on file:    Telephone Information:   Mobile [phone]       Call taken on 2/2/2024 at 1:31 PM by Karen Hamm

## 2024-02-07 NOTE — TELEPHONE ENCOUNTER
Triage Patient Outreach    Attempt # 1    Was call answered?  No.  Left message on voicemail with information to call triage back.    On callback - please triage abdominal pain     Seferino Lai RN  [facility]

## 2024-02-07 NOTE — TELEPHONE ENCOUNTER
"Patient feels he is having problems with left side, more towards the back.     Denies any blood in urine or fever.     Appointment scheduled.   Reason for Disposition    Abdominal pain is a chronic symptom (recurrent or ongoing AND lasting > 4 weeks)    Answer Assessment - Initial Assessment Questions  1. LOCATION: \"Where does it hurt?\"       Back  ( flank ) pain.   2. RADIATION: \"Does the pain shoot anywhere else?\" (e.g., chest, back)      No  3. ONSET: \"When did the pain begin?\" (Minutes, hours or days ago)       Many months   4. SUDDEN: \"Gradual or sudden onset?\"      Negative.   5. PATTERN \"Does the pain come and go, or is it constant?\"     - If it comes and goes: \"How long does it last?\" \"Do you have pain now?\"      (Note: Comes and goes means the pain is intermittent. It goes away completely between bouts.)     - If constant: \"Is it getting better, staying the same, or getting worse?\"       (Note: Constant means the pain never goes away completely; most serious pain is constant and gets worse.)      Comes and goes.   6. SEVERITY: \"How bad is the pain?\"  (e.g., Scale 1-10; mild, moderate, or severe)     - MILD (1-3): Doesn't interfere with normal activities, abdomen soft and not tender to touch.      - MODERATE (4-7): Interferes with normal activities or awakens from sleep, abdomen tender to touch.      - SEVERE (8-10): Excruciating pain, doubled over, unable to do any normal activities.        4-7  7. RECURRENT SYMPTOM: \"Have you ever had this type of stomach pain before?\" If Yes, ask: \"When was the last time?\" and \"What happened that time?\"       No  8. CAUSE: \"What do you think is causing the stomach pain?\"      Kidney stone?   9. RELIEVING/AGGRAVATING FACTORS: \"What makes it better or worse?\" (e.g., antacids, bending or twisting motion, bowel movement)      No  10. OTHER SYMPTOMS: \"Do you have any other symptoms?\" (e.g., back pain, diarrhea, fever, urination pain, vomiting)        No    Protocols used: " Abdominal Pain - Male-A-[place]    Nora Ellington ,RN  -[employer]

## 2024-02-08 NOTE — TELEPHONE ENCOUNTER
Recommend office visit appointment with team provider as soon as possible or if symptoms too severe to wait for that, then recommend UC

## 2024-02-08 NOTE — TELEPHONE ENCOUNTER
Called patient. He is seeing Lenny tomorrow. Advised he go to UC if symtpoms worsen. He is in agreement with that.

## 2024-02-09 ENCOUNTER — HOSPITAL ENCOUNTER (OUTPATIENT)
Dept: CT IMAGING | Facility: CLINIC | Age: 84
Discharge: HOME | End: 2024-02-09
Attending: NURSE PRACTITIONER | Admitting: NURSE PRACTITIONER
Payer: MEDICARE

## 2024-02-09 ENCOUNTER — OFFICE VISIT (OUTPATIENT)
Dept: FAMILY MEDICINE | Facility: CLINIC | Age: 84
End: 2024-02-09
Payer: MEDICARE

## 2024-02-09 ENCOUNTER — OFFICE VISIT (OUTPATIENT)
Dept: PEDIATRICS | Facility: CLINIC | Age: 84
End: 2024-02-09
Payer: MEDICARE

## 2024-02-09 VITALS
HEART RATE: 75 BPM | BODY MASS INDEX: 23.81 KG/M2 | TEMPERATURE: 97.7 F | RESPIRATION RATE: 16 BRPM | DIASTOLIC BLOOD PRESSURE: 86 MMHG | OXYGEN SATURATION: 99 % | HEIGHT: 68 IN | WEIGHT: 157.1 LBS | SYSTOLIC BLOOD PRESSURE: 135 MMHG

## 2024-02-09 VITALS
SYSTOLIC BLOOD PRESSURE: 144 MMHG | WEIGHT: 157 LBS | DIASTOLIC BLOOD PRESSURE: 88 MMHG | BODY MASS INDEX: 23.87 KG/M2 | RESPIRATION RATE: 18 BRPM | HEART RATE: 78 BPM | OXYGEN SATURATION: 100 % | TEMPERATURE: 97.8 F

## 2024-02-09 DIAGNOSIS — R31.0 GROSS HEMATURIA: ICD-10-CM

## 2024-02-09 DIAGNOSIS — Z90.79 S/P TURP: ICD-10-CM

## 2024-02-09 DIAGNOSIS — N40.1 ENLARGED PROSTATE WITH URINARY RETENTION: ICD-10-CM

## 2024-02-09 DIAGNOSIS — R14.0 ABDOMINAL DISTENTION: Primary | ICD-10-CM

## 2024-02-09 DIAGNOSIS — R34 LOW URINE OUTPUT: ICD-10-CM

## 2024-02-09 DIAGNOSIS — R33.8 ENLARGED PROSTATE WITH URINARY RETENTION: ICD-10-CM

## 2024-02-09 DIAGNOSIS — R34 LOW URINE OUTPUT: Primary | ICD-10-CM

## 2024-02-09 DIAGNOSIS — R33.9 URINARY RETENTION WITH INCOMPLETE BLADDER EMPTYING: ICD-10-CM

## 2024-02-09 LAB
ALBUMIN SERPL BCG-MCNC: 4.7 G/DL (ref 3.5–5.2)
ALBUMIN UR-MCNC: 70 MG/DL
ALP SERPL-CCNC: 60 U/L (ref 40–150)
ALT SERPL W P-5'-P-CCNC: 26 U/L (ref 0–70)
ANION GAP SERPL CALCULATED.3IONS-SCNC: 10 MMOL/L (ref 7–15)
APPEARANCE UR: ABNORMAL
AST SERPL W P-5'-P-CCNC: 25 U/L (ref 0–45)
BASOPHILS # BLD AUTO: 0 10E3/UL (ref 0–0.2)
BASOPHILS NFR BLD AUTO: 1 %
BILIRUB SERPL-MCNC: 0.6 MG/DL
BILIRUB UR QL STRIP: NEGATIVE
BUN SERPL-MCNC: 19.9 MG/DL (ref 8–23)
CALCIUM SERPL-MCNC: 9.6 MG/DL (ref 8.8–10.2)
CHLORIDE SERPL-SCNC: 103 MMOL/L (ref 98–107)
COLOR UR AUTO: YELLOW
CREAT SERPL-MCNC: 0.82 MG/DL (ref 0.67–1.17)
CRP SERPL-MCNC: <3 MG/L
DEPRECATED HCO3 PLAS-SCNC: 27 MMOL/L (ref 22–29)
EGFRCR SERPLBLD CKD-EPI 2021: 87 ML/MIN/1.73M2
EOSINOPHIL # BLD AUTO: 0 10E3/UL (ref 0–0.7)
EOSINOPHIL NFR BLD AUTO: 1 %
ERYTHROCYTE [DISTWIDTH] IN BLOOD BY AUTOMATED COUNT: 13.5 % (ref 10–15)
GLUCOSE SERPL-MCNC: 102 MG/DL (ref 70–99)
GLUCOSE UR STRIP-MCNC: NEGATIVE MG/DL
HCT VFR BLD AUTO: 40.3 % (ref 40–53)
HGB BLD-MCNC: 12.7 G/DL (ref 13.3–17.7)
HGB UR QL STRIP: ABNORMAL
IMM GRANULOCYTES # BLD: 0 10E3/UL
IMM GRANULOCYTES NFR BLD: 0 %
KETONES UR STRIP-MCNC: NEGATIVE MG/DL
LEUKOCYTE ESTERASE UR QL STRIP: ABNORMAL
LYMPHOCYTES # BLD AUTO: 1.3 10E3/UL (ref 0.8–5.3)
LYMPHOCYTES NFR BLD AUTO: 24 %
MCH RBC QN AUTO: 30.2 PG (ref 26.5–33)
MCHC RBC AUTO-ENTMCNC: 31.5 G/DL (ref 31.5–36.5)
MCV RBC AUTO: 96 FL (ref 78–100)
MONOCYTES # BLD AUTO: 0.3 10E3/UL (ref 0–1.3)
MONOCYTES NFR BLD AUTO: 6 %
MUCOUS THREADS #/AREA URNS LPF: PRESENT /LPF
NEUTROPHILS # BLD AUTO: 3.5 10E3/UL (ref 1.6–8.3)
NEUTROPHILS NFR BLD AUTO: 68 %
NITRATE UR QL: NEGATIVE
NRBC # BLD AUTO: 0 10E3/UL
NRBC BLD AUTO-RTO: 0 /100
PH UR STRIP: 6.5 [PH] (ref 5–7)
PLATELET # BLD AUTO: 259 10E3/UL (ref 150–450)
POTASSIUM SERPL-SCNC: 4.2 MMOL/L (ref 3.4–5.3)
PROT SERPL-MCNC: 7.2 G/DL (ref 6.4–8.3)
RBC # BLD AUTO: 4.21 10E6/UL (ref 4.4–5.9)
RBC URINE: 148 /HPF
SODIUM SERPL-SCNC: 140 MMOL/L (ref 135–145)
SP GR UR STRIP: 1.02 (ref 1–1.03)
SQUAMOUS EPITHELIAL: 1 /HPF
UROBILINOGEN UR STRIP-MCNC: NORMAL MG/DL
WBC # BLD AUTO: 5.2 10E3/UL (ref 4–11)
WBC CLUMPS #/AREA URNS HPF: PRESENT /HPF
WBC URINE: >182 /HPF

## 2024-02-09 PROCEDURE — 99215 OFFICE O/P EST HI 40 MIN: CPT | Performed by: PHYSICIAN ASSISTANT

## 2024-02-09 PROCEDURE — 74178 CT ABD&PLV WO CNTR FLWD CNTR: CPT | Mod: MA

## 2024-02-09 PROCEDURE — 87086 URINE CULTURE/COLONY COUNT: CPT | Performed by: NURSE PRACTITIONER

## 2024-02-09 PROCEDURE — 250N000009 HC RX 250: Performed by: NURSE PRACTITIONER

## 2024-02-09 PROCEDURE — 250N000011 HC RX IP 250 OP 636: Performed by: NURSE PRACTITIONER

## 2024-02-09 PROCEDURE — 36415 COLL VENOUS BLD VENIPUNCTURE: CPT | Performed by: NURSE PRACTITIONER

## 2024-02-09 PROCEDURE — 85025 COMPLETE CBC W/AUTO DIFF WBC: CPT | Performed by: NURSE PRACTITIONER

## 2024-02-09 PROCEDURE — 81001 URINALYSIS AUTO W/SCOPE: CPT | Performed by: NURSE PRACTITIONER

## 2024-02-09 PROCEDURE — 99214 OFFICE O/P EST MOD 30 MIN: CPT | Performed by: NURSE PRACTITIONER

## 2024-02-09 PROCEDURE — 86140 C-REACTIVE PROTEIN: CPT | Performed by: NURSE PRACTITIONER

## 2024-02-09 PROCEDURE — 80053 COMPREHEN METABOLIC PANEL: CPT | Performed by: NURSE PRACTITIONER

## 2024-02-09 RX ORDER — IOPAMIDOL 755 MG/ML
100 INJECTION, SOLUTION INTRAVASCULAR ONCE
Status: COMPLETED | OUTPATIENT
Start: 2024-02-09 | End: 2024-02-09

## 2024-02-09 RX ORDER — RESPIRATORY SYNCYTIAL VIRUS VACCINE 120MCG/0.5
0.5 KIT INTRAMUSCULAR ONCE
Qty: 1 EACH | Refills: 0 | Status: CANCELLED | OUTPATIENT
Start: 2024-02-09 | End: 2024-02-09

## 2024-02-09 RX ADMIN — SODIUM CHLORIDE 100 ML: 9 INJECTION, SOLUTION INTRAVENOUS at 14:21

## 2024-02-09 RX ADMIN — IOPAMIDOL 100 ML: 755 INJECTION, SOLUTION INTRAVENOUS at 14:21

## 2024-02-09 ASSESSMENT — PAIN SCALES - GENERAL: PAINLEVEL: NO PAIN (0)

## 2024-02-09 NOTE — PROGRESS NOTES
Assessment & Plan     Low urine output  - CBC with platelets differential; Future  - Comprehensive metabolic panel; Future  - CRP inflammation; Future  - UA with Microscopic reflex to Culture; Future  - CT Abdomen Pelvis w/o & w Contrast; Future  - sodium chloride (PF) 0.9% PF flush 3 mL  - CBC with platelets differential  - Comprehensive metabolic panel  - CRP inflammation  - UA with Microscopic reflex to Culture  - Urine Culture    Enlarged prostate with urinary retention    S/P TURP  - CBC with platelets differential; Future  - Comprehensive metabolic panel; Future  - CRP inflammation; Future  - UA with Microscopic reflex to Culture; Future  - CT Abdomen Pelvis w/o & w Contrast; Future  - sodium chloride (PF) 0.9% PF flush 3 mL  - CBC with platelets differential  - Comprehensive metabolic panel  - CRP inflammation  - UA with Microscopic reflex to Culture  - Urine Culture    Gross hematuria  - CBC with platelets differential; Future  - Comprehensive metabolic panel; Future  - CRP inflammation; Future  - UA with Microscopic reflex to Culture; Future  - CT Abdomen Pelvis w/o & w Contrast; Future  - sodium chloride (PF) 0.9% PF flush 3 mL  - CBC with platelets differential  - Comprehensive metabolic panel  - CRP inflammation  - UA with Microscopic reflex to Culture  - Urine Culture    Patient Instructions     Results for orders placed or performed during the hospital encounter of 02/09/24   CT Abdomen Pelvis w/o & w Contrast     Status: None (Preliminary result)    Narrative    CT ABDOMEN AND PELVIS WITHOUT AND WITH CONTRAST  2/9/2024 2:36 PM      HISTORY: Status post TURP. Low urine output. Gross hematuria.    COMPARISON: May 30, 2022    TECHNIQUE: Volumetric helical acquisition of CT images from the lung  bases through the symphysis pubis before and after the uneventful  administration of 100 mL Isovue-370 intravenous contrast. Radiation  dose for this scan was reduced using automated exposure  control,  adjustment of the mA and/or kV according to patient size, or iterative  reconstruction technique.    FINDINGS:   LOWER CHEST: Minimal linear atelectasis and/or fibrosis. No effusions.  Fat-containing hiatal hernia.    HEPATOBILIARY: Diffuse hepatic steatosis. No significant mass. No bile  duct dilatation. No calcified gallstones. Low-attenuation  subcentimeter liver lesion(s) compatible with benign cysts or other  benign lesions. No specific evaluation or follow-up is recommended in  a low risk patient.    PANCREAS: No significant mass, duct dilatation, or inflammatory  change.    SPLEEN: Normal size.    ADRENAL GLANDS: No significant nodules.    RIGHT KIDNEY: There are no stones seen within the kidney or ureter..  There is no hydroureter or hydronephrosis. There is no perinephric fat  stranding. Kidneys is normal in size and configuration. No suspicious  filling defects seen in the opacified intrarenal collecting systems,  ureters, or bladder to suggest a urothelial malignancy.     LEFT KIDNEY: There are no stones seen within the kidney or ureter..  There is no hydroureter or hydronephrosis. There is no perinephric fat  stranding. Kidneys is normal in size and configuration. No suspicious  filling defects seen in the opacified intrarenal collecting systems,  ureters, or bladder to suggest a urothelial malignancy.     BLADDER:  No stones or filing defect. Numerous bladder diverticula and  bladder distention.    BOWEL: Diverticulosis in the colon. No acute inflammatory change. No  obstruction.     PELVIC ORGANS: No pelvic masses. Prostatomegaly measuring 5.2 x 4.5 x  3.8 cm.    ADDITIONAL FINDINGS: No adenopathy or free fluid. There are mild  atherosclerotic changes of the visualized aorta and its branches.  There is no evidence of aortic dissection or aneurysm.    MUSCULOSKELETAL: No frankly destructive bony lesions. Anterolisthesis  of L4 on L5 which is likely degenerative.      Impression     IMPRESSION:  1.  No urolithiasis.  2.  No suspicious filling defects within the intrarenal collecting  systems, ureters, or bladder to suggest a urothelial malignancy.  3.  Numerous bladder diverticula and bladder distention compatible  with chronic bladder outlet obstruction.  4.  Prostatomegaly measuring 5.2 x 4.5 x 3.8 cm.   Results for orders placed or performed in visit on 02/09/24   Comprehensive metabolic panel     Status: Abnormal   Result Value Ref Range    Sodium 140 135 - 145 mmol/L    Potassium 4.2 3.4 - 5.3 mmol/L    Carbon Dioxide (CO2) 27 22 - 29 mmol/L    Anion Gap 10 7 - 15 mmol/L    Urea Nitrogen 19.9 8.0 - 23.0 mg/dL    Creatinine 0.82 0.67 - 1.17 mg/dL    GFR Estimate 87 >60 mL/min/1.73m2    Calcium 9.6 8.8 - 10.2 mg/dL    Chloride 103 98 - 107 mmol/L    Glucose 102 (H) 70 - 99 mg/dL    Alkaline Phosphatase 60 40 - 150 U/L    AST 25 0 - 45 U/L    ALT 26 0 - 70 U/L    Protein Total 7.2 6.4 - 8.3 g/dL    Albumin 4.7 3.5 - 5.2 g/dL    Bilirubin Total 0.6 <=1.2 mg/dL   CRP inflammation     Status: Normal   Result Value Ref Range    CRP Inflammation <3.00 <5.00 mg/L   UA with Microscopic reflex to Culture     Status: Abnormal    Specimen: Urine, Clean Catch   Result Value Ref Range    Color Urine Yellow Colorless, Straw, Light Yellow, Yellow    Appearance Urine Slightly Cloudy (A) Clear    Glucose Urine Negative Negative mg/dL    Bilirubin Urine Negative Negative    Ketones Urine Negative Negative mg/dL    Specific Gravity Urine 1.017 1.003 - 1.035    Blood Urine Moderate (A) Negative    pH Urine 6.5 5.0 - 7.0    Protein Albumin Urine 70 (A) Negative mg/dL    Urobilinogen Urine Normal Normal, 2.0 mg/dL    Nitrite Urine Negative Negative    Leukocyte Esterase Urine Large (A) Negative    WBC Clumps Urine Present (A) None Seen /HPF    Mucus Urine Present (A) None Seen /LPF    RBC Urine 148 (H) <=2 /HPF    WBC Urine >182 (H) <=5 /HPF    Squamous Epithelials Urine 1 <=1 /HPF    Narrative    Urine Culture  ordered based on laboratory criteria   CBC with platelets and differential     Status: Abnormal   Result Value Ref Range    WBC Count 5.2 4.0 - 11.0 10e3/uL    RBC Count 4.21 (L) 4.40 - 5.90 10e6/uL    Hemoglobin 12.7 (L) 13.3 - 17.7 g/dL    Hematocrit 40.3 40.0 - 53.0 %    MCV 96 78 - 100 fL    MCH 30.2 26.5 - 33.0 pg    MCHC 31.5 31.5 - 36.5 g/dL    RDW 13.5 10.0 - 15.0 %    Platelet Count 259 150 - 450 10e3/uL    % Neutrophils 68 %    % Lymphocytes 24 %    % Monocytes 6 %    % Eosinophils 1 %    % Basophils 1 %    % Immature Granulocytes 0 %    NRBCs per 100 WBC 0 <1 /100    Absolute Neutrophils 3.5 1.6 - 8.3 10e3/uL    Absolute Lymphocytes 1.3 0.8 - 5.3 10e3/uL    Absolute Monocytes 0.3 0.0 - 1.3 10e3/uL    Absolute Eosinophils 0.0 0.0 - 0.7 10e3/uL    Absolute Basophils 0.0 0.0 - 0.2 10e3/uL    Absolute Immature Granulocytes 0.0 <=0.4 10e3/uL    Absolute NRBCs 0.0 10e3/uL   CBC with platelets differential     Status: Abnormal    Narrative    The following orders were created for panel order CBC with platelets differential.  Procedure                               Abnormality         Status                     ---------                               -----------         ------                     CBC with platelets and d...[[id number]]  Abnormal            Final result                 Please view results for these tests on the individual orders.     There is evidence of contamination on the UA, a culture is pending.  If any bacteria grows on the culture we will let you know.    On the CT there is no stone obstructing but the prostate is again enlarged and the bladder is extended and this is consistent with chronic bladder outlet obstruction.    I want you to follow up with your urologist as planned.        Return in about 1 week (around 2/16/2024) for with regular provider if symptoms persist.    Hardeep Echeverria is a 84 year old, presenting for the following health issues:  Urinary Problem (Urinary retention X 2  "weeks)    HPI     Genitourinary - Male  Onset/Duration: X 2 weeks  Description:   Dysuria (painful urination): No  Hematuria (blood in urine): YES- noted \"chunk\" in toilet after urinating, urinating after this event, noticed Hematuria X 1, this was X 4 days ago.  No blood since  Frequency: YES  Waking at night to urinate: YES  Hesitancy (delay in urine): YES  Retention (unable to empty): YES  Decrease in urinary flow: YES- \"to a certain degree, yes\"  Incontinence: No  Progression of Symptoms:  Same  Accompanying Signs & Symptoms:  Fever: No  Back/Flank pain: YES- left flank pain intermittently  Urethral discharge: No  Testicle lumps/masses/pain: No  Nausea and/or vomiting: No  Abdominal pain: No  History:   History of frequent UTI s: No  History of kidney stones: YES, and \"bladder stone\"  History of hernias: No  Personal or Family history of Prostate problems: YES- prostate \"TURP\"  Sexually active: No  Precipitating or alleviating factors: no precipitating factors, no alleviating factors other being able to produce urine.  Therapies tried and outcome: none    Sent to [facility] to r/o obstruction/lesion/mass/prostate as a cause for low urine output.      Review of Systems  Constitutional, neuro, ENT, endocrine, pulmonary, cardiac, gastrointestinal, genitourinary, musculoskeletal, integument and psychiatric systems are negative, except as otherwise noted.      Objective    BP (!) 144/88 (BP Location: Left arm, Patient Position: Chair, Cuff Size: Adult Regular)   Pulse 78   Temp 97.8  F (36.6  C) (Oral)   Resp 18   Wt 71.2 kg (157 lb)   SpO2 100%   BMI 23.87 kg/m    Body mass index is 23.87 kg/m .  Physical Exam   GENERAL: alert and no distress  NECK: no adenopathy, no asymmetry, masses, or scars  RESP: lungs clear to auscultation - no rales, rhonchi or wheezes  CV: regular rate and rhythm, normal S1 S2, no S3 or S4, no murmur, click or rub, no peripheral edema  ABDOMEN: soft, nontender, no hepatosplenomegaly, no " masses and bowel sounds normal  MS: no gross musculoskeletal defects noted, no edema  BACK: no CVA tenderness, no paralumbar tenderness          Signed Electronically by: RONA Gonzales CNP

## 2024-02-09 NOTE — PROGRESS NOTES
Assessment & Plan     Abdominal distention  Urinary retention with incomplete bladder emptying  S/P TURP    Well-appearing today.  In no acute distress.  Physical exam displays abdominal distention with minimal tenderness.  Bladder scan only revealing approximately 200 mL which is not enough in my opinion to explain the amount of distention that he has.  Discussed need for lab work as well as CT imaging rather promptly.  If more fluid discovered or severe distention could consider catheterization.  Discussed with [facility].  Opening at [place] and are willing to take him as a patient today which is much appreciated.  Encouraged him to keep his appointment with urology in a few weeks.  Feels comfortable traveling to [place].    40 minutes spent by me on the date of the encounter on chart review, review of test results, interpretation of tests, patient visit, discussion with other providers, and documentation         No follow-ups on file.    The likelihood of other entities in the differential is insufficient to justify any further testing for them at this time. This was explained to the patient. The patient was advised that persistent or worsening symptoms would require further evaluation. Patient advised to call the office and if unable to reach to go to the emergency room if they develop any new or worsening symptoms. Expressed understanding and agreement with above stated plan.     PAULIE Fitzgerald [facility]   Juwan Seth Montoya is a pleasant 84 year old male presenting for the following health issues:  Patient presents with:  Abdominal Pain    1 month history of abdominal distention, urinary retention.  Status post TURP ~ 2012.  History of nephrolithiasis.  Lost to follow-up with urology.  Upcoming appointment with  in 2 weeks.     Seen to urinate 3 or 4 times in a row and only able to get out approximately 1 to 2 ounces.    Interestingly enough bowel  "movements help relieve the distention.  Has been having daily bowel movements that are regular to him.    No fever, chills, sweats.  No nausea, vomiting, early satiety.  No hematuria, dysuria, penile or scrotal pain.    Review of Systems   Constitutional, HEENT, cardiovascular, pulmonary, GI, , musculoskeletal, neuro, skin, endocrine and psych systems are negative, except as otherwise noted.      Objective    /86 (BP Location: Left arm, Patient Position: Sitting)   Pulse 75   Temp 97.7  F (36.5  C) (Tympanic)   Resp 16   Ht 1.727 m (5' 8\")   Wt 71.3 kg (157 lb 1.6 oz)   SpO2 99%   BMI 23.89 kg/m    5' 8\"  157 lbs 1.6 oz    Physical Exam   GENERAL: healthy, alert and no distress  EYES: Eyes grossly normal to inspection, PERRL and conjunctivae and sclerae normal  NECK: no adenopathy, no asymmetry, masses, or scars and thyroid normal to palpation  RESP: lungs clear to auscultation - no rales, rhonchi or wheezes  CV: regular rate and rhythm, normal S1 S2, no S3 or S4, no murmur, click or rub, no peripheral edema  ABDOMEN: soft, distended over the lower abdomen.  Minimal tenderness to this area.  Otherwise nontender, no hepatosplenomegaly.  Bowel sounds normal.  Negative CVA tenderness.  MS: no gross musculoskeletal defects noted, no edema  SKIN: no suspicious lesions or rashes  NEURO: Normal strength and tone, mentation intact and speech normal  PSYCH: mentation appears normal, affect normal/bright      "

## 2024-02-09 NOTE — PROGRESS NOTES
Bladder scan completed as ordered by provider.  Bladder volume: 130mL     Radha Culver RN on 2/9/2024 at 1:31 PM

## 2024-02-09 NOTE — PATIENT INSTRUCTIONS
Results for orders placed or performed during the hospital encounter of 02/09/24   CT Abdomen Pelvis w/o & w Contrast     Status: None (Preliminary result)    Narrative    CT ABDOMEN AND PELVIS WITHOUT AND WITH CONTRAST  2/9/2024 2:36 PM      HISTORY: Status post TURP. Low urine output. Gross hematuria.    COMPARISON: May 30, 2022    TECHNIQUE: Volumetric helical acquisition of CT images from the lung  bases through the symphysis pubis before and after the uneventful  administration of 100 mL Isovue-370 intravenous contrast. Radiation  dose for this scan was reduced using automated exposure control,  adjustment of the mA and/or kV according to patient size, or iterative  reconstruction technique.    FINDINGS:   LOWER CHEST: Minimal linear atelectasis and/or fibrosis. No effusions.  Fat-containing hiatal hernia.    HEPATOBILIARY: Diffuse hepatic steatosis. No significant mass. No bile  duct dilatation. No calcified gallstones. Low-attenuation  subcentimeter liver lesion(s) compatible with benign cysts or other  benign lesions. No specific evaluation or follow-up is recommended in  a low risk patient.    PANCREAS: No significant mass, duct dilatation, or inflammatory  change.    SPLEEN: Normal size.    ADRENAL GLANDS: No significant nodules.    RIGHT KIDNEY: There are no stones seen within the kidney or ureter..  There is no hydroureter or hydronephrosis. There is no perinephric fat  stranding. Kidneys is normal in size and configuration. No suspicious  filling defects seen in the opacified intrarenal collecting systems,  ureters, or bladder to suggest a urothelial malignancy.     LEFT KIDNEY: There are no stones seen within the kidney or ureter..  There is no hydroureter or hydronephrosis. There is no perinephric fat  stranding. Kidneys is normal in size and configuration. No suspicious  filling defects seen in the opacified intrarenal collecting systems,  ureters, or bladder to suggest a urothelial malignancy.      BLADDER:  No stones or filing defect. Numerous bladder diverticula and  bladder distention.    BOWEL: Diverticulosis in the colon. No acute inflammatory change. No  obstruction.     PELVIC ORGANS: No pelvic masses. Prostatomegaly measuring 5.2 x 4.5 x  3.8 cm.    ADDITIONAL FINDINGS: No adenopathy or free fluid. There are mild  atherosclerotic changes of the visualized aorta and its branches.  There is no evidence of aortic dissection or aneurysm.    MUSCULOSKELETAL: No frankly destructive bony lesions. Anterolisthesis  of L4 on L5 which is likely degenerative.      Impression    IMPRESSION:  1.  No urolithiasis.  2.  No suspicious filling defects within the intrarenal collecting  systems, ureters, or bladder to suggest a urothelial malignancy.  3.  Numerous bladder diverticula and bladder distention compatible  with chronic bladder outlet obstruction.  4.  Prostatomegaly measuring 5.2 x 4.5 x 3.8 cm.   Results for orders placed or performed in visit on 02/09/24   Comprehensive metabolic panel     Status: Abnormal   Result Value Ref Range    Sodium 140 135 - 145 mmol/L    Potassium 4.2 3.4 - 5.3 mmol/L    Carbon Dioxide (CO2) 27 22 - 29 mmol/L    Anion Gap 10 7 - 15 mmol/L    Urea Nitrogen 19.9 8.0 - 23.0 mg/dL    Creatinine 0.82 0.67 - 1.17 mg/dL    GFR Estimate 87 >60 mL/min/1.73m2    Calcium 9.6 8.8 - 10.2 mg/dL    Chloride 103 98 - 107 mmol/L    Glucose 102 (H) 70 - 99 mg/dL    Alkaline Phosphatase 60 40 - 150 U/L    AST 25 0 - 45 U/L    ALT 26 0 - 70 U/L    Protein Total 7.2 6.4 - 8.3 g/dL    Albumin 4.7 3.5 - 5.2 g/dL    Bilirubin Total 0.6 <=1.2 mg/dL   CRP inflammation     Status: Normal   Result Value Ref Range    CRP Inflammation <3.00 <5.00 mg/L   UA with Microscopic reflex to Culture     Status: Abnormal    Specimen: Urine, Clean Catch   Result Value Ref Range    Color Urine Yellow Colorless, Straw, Light Yellow, Yellow    Appearance Urine Slightly Cloudy (A) Clear    Glucose Urine Negative Negative  mg/dL    Bilirubin Urine Negative Negative    Ketones Urine Negative Negative mg/dL    Specific Gravity Urine 1.017 1.003 - 1.035    Blood Urine Moderate (A) Negative    pH Urine 6.5 5.0 - 7.0    Protein Albumin Urine 70 (A) Negative mg/dL    Urobilinogen Urine Normal Normal, 2.0 mg/dL    Nitrite Urine Negative Negative    Leukocyte Esterase Urine Large (A) Negative    WBC Clumps Urine Present (A) None Seen /HPF    Mucus Urine Present (A) None Seen /LPF    RBC Urine 148 (H) <=2 /HPF    WBC Urine >182 (H) <=5 /HPF    Squamous Epithelials Urine 1 <=1 /HPF    Narrative    Urine Culture ordered based on laboratory criteria   CBC with platelets and differential     Status: Abnormal   Result Value Ref Range    WBC Count 5.2 4.0 - 11.0 10e3/uL    RBC Count 4.21 (L) 4.40 - 5.90 10e6/uL    Hemoglobin 12.7 (L) 13.3 - 17.7 g/dL    Hematocrit 40.3 40.0 - 53.0 %    MCV 96 78 - 100 fL    MCH 30.2 26.5 - 33.0 pg    MCHC 31.5 31.5 - 36.5 g/dL    RDW 13.5 10.0 - 15.0 %    Platelet Count 259 150 - 450 10e3/uL    % Neutrophils 68 %    % Lymphocytes 24 %    % Monocytes 6 %    % Eosinophils 1 %    % Basophils 1 %    % Immature Granulocytes 0 %    NRBCs per 100 WBC 0 <1 /100    Absolute Neutrophils 3.5 1.6 - 8.3 10e3/uL    Absolute Lymphocytes 1.3 0.8 - 5.3 10e3/uL    Absolute Monocytes 0.3 0.0 - 1.3 10e3/uL    Absolute Eosinophils 0.0 0.0 - 0.7 10e3/uL    Absolute Basophils 0.0 0.0 - 0.2 10e3/uL    Absolute Immature Granulocytes 0.0 <=0.4 10e3/uL    Absolute NRBCs 0.0 10e3/uL   CBC with platelets differential     Status: Abnormal    Narrative    The following orders were created for panel order CBC with platelets differential.  Procedure                               Abnormality         Status                     ---------                               -----------         ------                     CBC with platelets and d...[[id number]]  Abnormal            Final result                 Please view results for these tests on the  individual orders.     There is evidence of contamination on the UA, a culture is pending.  If any bacteria grows on the culture we will let you know.    On the CT there is no stone obstructing but the prostate is again enlarged and the bladder is extended and this is consistent with chronic bladder outlet obstruction.    I want you to follow up with your urologist as planned.

## 2024-02-10 LAB — BACTERIA UR CULT: NO GROWTH

## 2024-02-28 ENCOUNTER — OFFICE VISIT (OUTPATIENT)
Dept: URGENT CARE | Facility: URGENT CARE | Age: 84
End: 2024-02-28
Payer: MEDICARE

## 2024-02-28 VITALS
WEIGHT: 158 LBS | BODY MASS INDEX: 24.02 KG/M2 | TEMPERATURE: 100.2 F | HEART RATE: 107 BPM | DIASTOLIC BLOOD PRESSURE: 69 MMHG | SYSTOLIC BLOOD PRESSURE: 111 MMHG | OXYGEN SATURATION: 99 %

## 2024-02-28 DIAGNOSIS — R50.9 FEVER, UNSPECIFIED: ICD-10-CM

## 2024-02-28 DIAGNOSIS — J10.1 INFLUENZA A: Primary | ICD-10-CM

## 2024-02-28 LAB
FLUAV AG SPEC QL IA: POSITIVE
FLUBV AG SPEC QL IA: NEGATIVE

## 2024-02-28 PROCEDURE — 87804 INFLUENZA ASSAY W/OPTIC: CPT

## 2024-02-28 PROCEDURE — 87635 SARS-COV-2 COVID-19 AMP PRB: CPT | Performed by: PHYSICIAN ASSISTANT

## 2024-02-28 PROCEDURE — 99214 OFFICE O/P EST MOD 30 MIN: CPT | Performed by: PHYSICIAN ASSISTANT

## 2024-02-28 RX ORDER — UBIDECARENONE 100 MG
100 CAPSULE ORAL
COMMUNITY

## 2024-02-28 RX ORDER — TAMSULOSIN HYDROCHLORIDE 0.4 MG/1
0.4 CAPSULE ORAL DAILY
COMMUNITY
Start: 2024-02-23

## 2024-02-28 NOTE — PROGRESS NOTES
Patient presents with:  Urgent Care: A week and half of the cold,  about 3-4 days ago hard to stand, fatigue       Clinical Decision Making:  DDX: influenza, Covid-19  He has a positive influenza exam today.  There is no evidence of a sequela of pneumonia.  He is not a candidate for oseltamivir the onset was 6-7 days ago.  In addition he has been feeling light headed when standing and walking.  This is most likely due to a decreased fluid intake since he is concerned for increased frequency of urination at night.  Referred him to [facility] who was able to put him on their schedule for tomorrow.  I do think that this is reasonable as he agreed to increase fluids today and follow up tomorrow.  Family was able to check in with him regularly as he is independent at home with his wife. Will continue to take OTC medications for fever.     ICD-10-CM    1. Influenza A  J10.1 Referral to Acute and Diagnostic Services (Day of diagnostic / First order acute)      2. Fever, unspecified  R50.9 Symptomatic COVID-19 Virus (Coronavirus) by PCR Nasopharyngeal     Influenza A/B antigen          There are no Patient Instructions on file for this visit.    HPI:  Juwan Montoya is a 84 year old male who presents today complaining of cough, fever and weakness for the past 6-7 days.  He has been taking tylenol 650 mg for fever but has not had anything today.  He was recently seen for urinary frequency and started on flomax for. Juwan endorses not drinking as much water due to having to wake up at night to use the bathroom more often.      History obtained from daughter and the patient.    Problem List:  2022-08: Nephrolithiasis  2019-03: Venous stasis ulcer of left ankle (H)  2019-01: Pain in joint involving ankle and foot, right  2017-07: Multiple rib fractures  2016-07: Anemia, unspecified type  2013-04: Advanced directives, counseling/discussion  2010-10: CARDIOVASCULAR SCREENING; LDL GOAL LESS THAN 130  2009-05: Ankle pain  2009-05:  Aftercare for healing traumatic fracture of leg  2009-05: Other postprocedural status(V45.89)  2006-06: Esophageal reflux  2004-10: S/P TURP  2004-10: Other specified disorder of skin  Essential hypertension      Past Medical History:   Diagnosis Date    Asymptomatic varicose veins     Bladder stone     Chronic depressive personality disorder     Esophageal reflux     Hepatic cyst     Hypertrophy (benign) of prostate 9/94    turp operation    Nephrolithiasis     S/P TURP     Unspecified essential hypertension        Social History     Tobacco Use    Smoking status: Former     Packs/day: 1.00     Years: 30.00     Additional pack years: 0.00     Total pack years: 30.00     Types: Cigarettes    Smokeless tobacco: Former     Quit date: 12/6/1961    Tobacco comments:     2 packs per year   Substance Use Topics    Alcohol use: Yes     Alcohol/week: 0.0 standard drinks of alcohol     Comment: socially       Review of Systems    Vitals:    02/28/24 1430   BP: 111/69   Pulse: 107   Temp: 100.2  F (37.9  C)   SpO2: 99%   Weight: 71.7 kg (158 lb)       Physical Exam  Vitals and nursing note reviewed.   Constitutional:       General: He is not in acute distress.     Appearance: He is not toxic-appearing or diaphoretic.   HENT:      Head: Normocephalic and atraumatic.      Right Ear: Tympanic membrane and external ear normal.      Left Ear: Tympanic membrane and external ear normal.   Eyes:      Conjunctiva/sclera: Conjunctivae normal.   Cardiovascular:      Rate and Rhythm: Normal rate and regular rhythm.      Pulses: Normal pulses.      Heart sounds: Normal heart sounds. No murmur heard.  Pulmonary:      Effort: Pulmonary effort is normal. No respiratory distress.      Breath sounds: Normal breath sounds.   Abdominal:      General: Abdomen is flat.      Palpations: Abdomen is soft.   Musculoskeletal:         General: Normal range of motion.      Cervical back: Normal range of motion and neck supple.   Skin:     General: Skin  is warm and dry.   Neurological:      Mental Status: He is alert.   Psychiatric:         Mood and Affect: Mood normal.         Behavior: Behavior normal.         Thought Content: Thought content normal.         Judgment: Judgment normal.         Results:  Results for orders placed or performed in visit on 02/28/24   Influenza A/B antigen     Status: Abnormal    Specimen: Nasopharyngeal; Swab   Result Value Ref Range    Influenza A antigen Positive (A) Negative    Influenza B antigen Negative Negative    Narrative    Test results must be correlated with clinical data. If necessary, results should be confirmed by a molecular assay or viral culture.         At the end of the encounter, I discussed results, diagnosis, medications. Discussed red flags for immediate return to clinic/ER, as well as indications for follow up if no improvement. Patient understood and agreed to plan. Patient was stable for discharge.

## 2024-02-29 ENCOUNTER — OFFICE VISIT (OUTPATIENT)
Dept: PEDIATRICS | Facility: CLINIC | Age: 84
End: 2024-02-29
Payer: MEDICARE

## 2024-02-29 VITALS
TEMPERATURE: 97 F | RESPIRATION RATE: 20 BRPM | OXYGEN SATURATION: 97 % | BODY MASS INDEX: 24.11 KG/M2 | DIASTOLIC BLOOD PRESSURE: 65 MMHG | SYSTOLIC BLOOD PRESSURE: 110 MMHG | HEART RATE: 75 BPM | HEIGHT: 68 IN | WEIGHT: 159.1 LBS

## 2024-02-29 DIAGNOSIS — R63.8 POOR FLUID INTAKE: ICD-10-CM

## 2024-02-29 DIAGNOSIS — J10.1 INFLUENZA A: Primary | ICD-10-CM

## 2024-02-29 DIAGNOSIS — T50.905A ADVERSE EFFECT OF DRUG, INITIAL ENCOUNTER: ICD-10-CM

## 2024-02-29 LAB — SARS-COV-2 RNA RESP QL NAA+PROBE: NEGATIVE

## 2024-02-29 PROCEDURE — 99215 OFFICE O/P EST HI 40 MIN: CPT | Performed by: PHYSICIAN ASSISTANT

## 2024-02-29 RX ADMIN — Medication 500 ML: at 10:07

## 2024-02-29 NOTE — PATIENT INSTRUCTIONS
Your lightheadedness may be due to your Flomax. I recommend following up with urology regarding this.   Try to drink 64 oz of water daily.

## 2024-02-29 NOTE — PROGRESS NOTES
Assessment/Plan:    Discussed that Flomax should allow the bladder to more completely empty, so should result in less frequent trips to the bathroom. May be perceived as urinating more, however, is urinary flow is increased compared to what pt is used to. Encouraged pt to drink 64 oz water daily, especially in the setting of influenza A. Pt was given 500 mL bolus of fluids today.     Orthostatic vitals not c/w orthostatic hypotension. Dizziness is a potential side effect of Flomax; may also be combination of this and influenza. Advised pt follow up with his urologist if symptoms do not improve.    See patient instructions below.    At the end of the encounter, I discussed results, diagnosis, medications. Discussed red flags for immediate return to clinic/ER, as well as indications for follow up if no improvement. Patient understood and agreed to plan. Patient was stable for discharge.    40 minutes was spent preparing for the visit and reviewing the patient's chart; getting a history and performing an exam; counseling and providing education to the patient, family, or caregiver; ordering medicines and/or tests; communicating with other healthcare professionals; documenting information in the medical record; interpreting results and sharing that information with the patient, family, or caregiver; and care coordination.       ICD-10-CM    1. Influenza A  J10.1       2. Poor fluid intake  R63.8 sodium chloride (PF) 0.9% PF flush 3 mL     sodium chloride 0.9% BOLUS 500 mL      3. Adverse effect of drug, initial encounter  T50.905A             Return in about 1 week (around 3/7/2024) for follow up with urology.    HOMERO Colón, PAJACLYN  [facility]  -----------------------------------------------------------------------------------------------------------------------------------------------------    HPI:  Juwan Montoya is a 84 year old male who presents for evaluation of the  following:    Dehydration  Onset/Duration: 1 week ago       Nausea: no        Vomiting:  no        Diarrhea: no        Constipation: no        Melena/dark stools: no        What has oral intake been like: not drinking fluids as much       Abdominal pain: no   Risks       Recent travel: no        Exposures to ill contacts: no        Recent antibiotics past 2 months: no        Other: Positive influenza A  Progression of symptoms: same  Accompanying signs and symptoms:       Fever/chills: YES, fever yesterday at urgent care       Gas/bloating: no        Dysuria or hematuria: no        Other symptoms: No  Therapies tried and outcome: None    Pt was seen in urgent care yesterday for 1 week of cough, fever, & weakness. He tested positive for influenza A. Provider's note from yesterday states that pt has not been drinking as much water due to waking up at night to use the bathroom more often. Therefore, he was referred to the [facility] today for IV fluids. Today, however, pt states his fluid intake has been about the same as it always has been. Admittedly, this is less than ideal with about 3-4 cups of water daily. He does report feeling lightheaded when standing, which is new for him. No presyncope or syncope, CP, SOB, or palpitations.    He was seen at the [facility] on 2/9 for decreased urinary flow. Workup was consistent with chronic bladder outlet obstruction. He is followed by urology, was recently started on Flomax. Does have hx of TURP ~ 2012. He is followed by urology, saw them last week & was started on Flomax.    Since starting the Flomax, he hasn't necessarily noticed significant improvement in urination as he still reports some urinary hesitancy and doesn't feel that he completely empties his bladder when he goes.    Past Medical History:   Diagnosis Date    Asymptomatic varicose veins     Bladder stone     Chronic depressive personality disorder     Esophageal reflux     Hepatic cyst     Hypertrophy (benign) of prostate  "9/94    turp operation    Nephrolithiasis     S/P TURP     Unspecified essential hypertension        Vitals:    02/29/24 0916   BP: 115/69   BP Location: Right arm   Patient Position: Sitting   Cuff Size: Adult Regular   Pulse: 99   Resp: 20   Temp: 97  F (36.1  C)   SpO2: 97%   Weight: 72.2 kg (159 lb 1.6 oz)   Height: 1.727 m (5' 8\")       Physical Exam    Labs/Imaging:  Results for orders placed or performed in visit on 02/28/24 (from the past 24 hour(s))   Influenza A/B antigen    Specimen: Nasopharyngeal; Swab   Result Value Ref Range    Influenza A antigen Positive (A) Negative    Influenza B antigen Negative Negative    Narrative    Test results must be correlated with clinical data. If necessary, results should be confirmed by a molecular assay or viral culture.     No results found for this or any previous visit (from the past 24 hour(s)).        Patient Instructions   Your lightheadedness may be due to your Flomax. I recommend following up with urology regarding this.   Try to drink 64 oz of water daily.         "

## 2024-02-29 NOTE — PROGRESS NOTES
{PROVIDER CHARTING PREFERENCE:359669}    Hardeep Echeverria is a 84 year old, presenting for the following health issues:  No chief complaint on file.    HPI     Nausea/Vomiting/Dehydration  Onset/Duration: 1 week ago       Nausea: no        Vomiting:  no        Diarrhea: no        Constipation: no        Melena/dark stools: no        What has oral intake been like: not eating as normally and not drinking fluids as much       Abdominal pain: no   Risks       Recent travel: no        Exposures to ill contacts: no        Recent antibiotics past 2 months: no        Other: Positive influenza A  Progression of symptoms: same  Accompanying signs and symptoms:       Fever/chills: YES, fever yesterday at urgent care       Gas/bloating: no        Dysuria or hematuria: no        Other symptoms: No  Therapies tried and outcome: None      {ROS Picklists (Optional):626707}      Objective    There were no vitals taken for this visit.  There is no height or weight on file to calculate BMI.  Physical Exam   {Exam List (Optional):885583}    {Diagnostic Test Results (Optional):213971}        Signed Electronically by: Jeannette Etienne PA-C  {Email feedback regarding this note to [email or web address]   :[id number]}

## 2024-09-27 ENCOUNTER — TELEPHONE (OUTPATIENT)
Dept: SCHEDULING | Facility: CLINIC | Age: 84
End: 2024-09-27
Payer: MEDICARE

## 2024-09-27 NOTE — TELEPHONE ENCOUNTER
Reason for Call:  Appointment Request    Patient requesting this type of appt:  Preventive     Requested provider: Dipesh Oviedo    Reason patient unable to be scheduled: Not within requested timeframe    When does patient want to be seen/preferred time:  Within 30 days    Comments: Requesting appointment with PCP for annual physical.    Okay to leave a detailed message?: Yes at Home number on file [phone] (home)    Call taken on 9/27/2024 at 12:38 PM by Miriam Casarez

## 2024-11-01 ENCOUNTER — OFFICE VISIT (OUTPATIENT)
Dept: FAMILY MEDICINE | Facility: CLINIC | Age: 84
End: 2024-11-01
Payer: COMMERCIAL

## 2024-11-01 VITALS
SYSTOLIC BLOOD PRESSURE: 133 MMHG | HEART RATE: 82 BPM | OXYGEN SATURATION: 97 % | TEMPERATURE: 98.9 F | BODY MASS INDEX: 24.77 KG/M2 | DIASTOLIC BLOOD PRESSURE: 77 MMHG | HEIGHT: 68 IN | RESPIRATION RATE: 17 BRPM | WEIGHT: 163.4 LBS

## 2024-11-01 DIAGNOSIS — R39.16 BENIGN PROSTATIC HYPERPLASIA (BPH) WITH STRAINING ON URINATION: ICD-10-CM

## 2024-11-01 DIAGNOSIS — D64.9 ANEMIA, UNSPECIFIED TYPE: ICD-10-CM

## 2024-11-01 DIAGNOSIS — N40.1 BENIGN PROSTATIC HYPERPLASIA (BPH) WITH STRAINING ON URINATION: ICD-10-CM

## 2024-11-01 DIAGNOSIS — Z00.00 ROUTINE GENERAL MEDICAL EXAMINATION AT A HEALTH CARE FACILITY: Primary | ICD-10-CM

## 2024-11-01 DIAGNOSIS — R53.83 CAREGIVER WITH FATIGUE: ICD-10-CM

## 2024-11-01 DIAGNOSIS — Z90.79 S/P TURP: ICD-10-CM

## 2024-11-01 DIAGNOSIS — K76.0 NAFLD (NONALCOHOLIC FATTY LIVER DISEASE): ICD-10-CM

## 2024-11-01 DIAGNOSIS — Z12.11 SCREEN FOR COLON CANCER: ICD-10-CM

## 2024-11-01 PROBLEM — N40.0 BPH (BENIGN PROSTATIC HYPERPLASIA): Status: ACTIVE | Noted: 2024-11-01

## 2024-11-01 LAB
ERYTHROCYTE [DISTWIDTH] IN BLOOD BY AUTOMATED COUNT: 13.2 % (ref 10–15)
HCT VFR BLD AUTO: 41 % (ref 40–53)
HGB BLD-MCNC: 12.6 G/DL (ref 13.3–17.7)
MCH RBC QN AUTO: 29.7 PG (ref 26.5–33)
MCHC RBC AUTO-ENTMCNC: 30.7 G/DL (ref 31.5–36.5)
MCV RBC AUTO: 97 FL (ref 78–100)
PLATELET # BLD AUTO: 230 10E3/UL (ref 150–450)
RBC # BLD AUTO: 4.24 10E6/UL (ref 4.4–5.9)
WBC # BLD AUTO: 4.9 10E3/UL (ref 4–11)

## 2024-11-01 PROCEDURE — 82728 ASSAY OF FERRITIN: CPT | Performed by: INTERNAL MEDICINE

## 2024-11-01 PROCEDURE — 82607 VITAMIN B-12: CPT | Performed by: INTERNAL MEDICINE

## 2024-11-01 PROCEDURE — 80053 COMPREHEN METABOLIC PANEL: CPT | Performed by: INTERNAL MEDICINE

## 2024-11-01 PROCEDURE — 83540 ASSAY OF IRON: CPT | Performed by: INTERNAL MEDICINE

## 2024-11-01 PROCEDURE — 85027 COMPLETE CBC AUTOMATED: CPT | Performed by: INTERNAL MEDICINE

## 2024-11-01 PROCEDURE — 83550 IRON BINDING TEST: CPT | Performed by: INTERNAL MEDICINE

## 2024-11-01 PROCEDURE — 80061 LIPID PANEL: CPT | Performed by: INTERNAL MEDICINE

## 2024-11-01 PROCEDURE — 36415 COLL VENOUS BLD VENIPUNCTURE: CPT | Performed by: INTERNAL MEDICINE

## 2024-11-01 PROCEDURE — 99397 PER PM REEVAL EST PAT 65+ YR: CPT | Performed by: INTERNAL MEDICINE

## 2024-11-01 SDOH — HEALTH STABILITY: PHYSICAL HEALTH: ON AVERAGE, HOW MANY DAYS PER WEEK DO YOU ENGAGE IN MODERATE TO STRENUOUS EXERCISE (LIKE A BRISK WALK)?: 7 DAYS

## 2024-11-01 SDOH — HEALTH STABILITY: PHYSICAL HEALTH: ON AVERAGE, HOW MANY MINUTES DO YOU ENGAGE IN EXERCISE AT THIS LEVEL?: 30 MIN

## 2024-11-01 ASSESSMENT — SOCIAL DETERMINANTS OF HEALTH (SDOH): HOW OFTEN DO YOU GET TOGETHER WITH FRIENDS OR RELATIVES?: NEVER

## 2024-11-01 ASSESSMENT — PAIN SCALES - GENERAL: PAINLEVEL_OUTOF10: NO PAIN (0)

## 2024-11-01 NOTE — PATIENT INSTRUCTIONS
(Z00.00) Routine general medical examination at a health care facility  (primary encounter diagnosis)  Comment: For routine exam, we will draw labs as ordered, cholesterol, diabetes mellitus check, liver function, renal function.  We discussed risks and benefits of screening colonoscopy and this was declined we will also update vaccination history. Shingrix vaccine is now available.  I would call your insurance to see if a shingles vaccine is covered and get this at your pharmacy  Plan:     (N40.1,  R39.16) Benign prostatic hyperplasia (BPH) with straining on urination  Comment: Continue tamsulosin and saw palmetto and follow-up with urology  Plan:     (Z90.79) S/P TURP  Comment: As above  Plan:     (K76.0) NAFLD (nonalcoholic fatty liver disease)  Comment: Recommend recheck abdominal ultrasound when you are able to [facility] phone #[phone]   Plan:

## 2024-11-01 NOTE — RESULT ENCOUNTER NOTE
Jarrod Echeverria,    I have had the opportunity to review your recent results and an interpretation is as follows:  Your follow-up hemoglobin returned low again, and I recommend we add on iron studies, vitamin B12 and folic acid if you are able to    Sincerely,  Dipesh Oviedo MD

## 2024-11-01 NOTE — LETTER
November 4, 2024      Juwan Montoya  [address] [facility] [id number]        Jarrod Echeverria,     DANIELLA had the opportunity to review your recent labs and a summary of your labs reads as follows:     Your complete blood counts show persistent anemia   Your iron studies, vitamin b12 and iron studies returned stable and within normal limits   Your comprehensive metabolic panel showed normal renal function, normal liver function, and normal fasting blood glucose indicating no evidence of diabetes mellitus.   Your fasting lipid panel show   - normal HDL (good) cholesterol -as your goal is greater than 40   - low LDL (bad) cholesterol as your goal is less than 100   - normal triglyceride levels             Sincerely,   Dipesh Oviedo MD     Resulted Orders   Lipid panel reflex to direct LDL Non-fasting   Result Value Ref Range    Cholesterol 156 <200 mg/dL    Triglycerides 68 <150 mg/dL    Direct Measure HDL 63 >=40 mg/dL    LDL Cholesterol Calculated 79 <100 mg/dL    Non HDL Cholesterol 93 <130 mg/dL    Patient Fasting > 8hrs? No     Narrative    Cholesterol  Desirable: < 200 mg/dL  Borderline High: 200 - 239 mg/dL  High: >= 240 mg/dL    Triglycerides  Normal: < 150 mg/dL  Borderline High: 150 - 199 mg/dL  High: 200-499 mg/dL  Very High: >= 500 mg/dL    Direct Measure HDL  Female: >= 50 mg/dL   Male: >= 40 mg/dL    LDL Cholesterol  Desirable: < 100 mg/dL  Above Desirable: 100 - 129 mg/dL   Borderline High: 130 - 159 mg/dL   High:  160 - 189 mg/dL   Very High: >= 190 mg/dL    Non HDL Cholesterol  Desirable: < 130 mg/dL  Above Desirable: 130 - 159 mg/dL  Borderline High: 160 - 189 mg/dL  High: 190 - 219 mg/dL  Very High: >= 220 mg/dL   Comprehensive metabolic panel   Result Value Ref Range    Sodium 141 135 - 145 mmol/L    Potassium 5.1 3.4 - 5.3 mmol/L    Carbon Dioxide (CO2) 26 22 - 29 mmol/L    Anion Gap 8 7 - 15 mmol/L    Urea Nitrogen 16.4 8.0 - 23.0 mg/dL    Creatinine 0.83 0.67 - 1.17 mg/dL    GFR  Estimate 86 >60 mL/min/1.73m2      Comment:      eGFR calculated using 2021 CKD-EPI equation.    Calcium 9.8 8.8 - 10.4 mg/dL      Comment:      Reference intervals for this test were updated on 7/16/2024 to reflect our healthy population more accurately. There may be differences in the flagging of prior results with similar values performed with this method. Those prior results can be interpreted in the context of the updated reference intervals.    Chloride 107 98 - 107 mmol/L    Glucose 99 70 - 99 mg/dL    Alkaline Phosphatase 54 40 - 150 U/L    AST 24 0 - 45 U/L    ALT 26 0 - 70 U/L    Protein Total 6.8 6.4 - 8.3 g/dL    Albumin 4.4 3.5 - 5.2 g/dL    Bilirubin Total 0.3 <=1.2 mg/dL    Patient Fasting > 8hrs? No    CBC with platelets   Result Value Ref Range    WBC Count 4.9 4.0 - 11.0 10e3/uL    RBC Count 4.24 (L) 4.40 - 5.90 10e6/uL    Hemoglobin 12.6 (L) 13.3 - 17.7 g/dL    Hematocrit 41.0 40.0 - 53.0 %    MCV 97 78 - 100 fL    MCH 29.7 26.5 - 33.0 pg    MCHC 30.7 (L) 31.5 - 36.5 g/dL    RDW 13.2 10.0 - 15.0 %    Platelet Count 230 150 - 450 10e3/uL   Iron & Iron Binding Capacity   Result Value Ref Range    Iron 78 61 - 157 ug/dL    Iron Binding Capacity 362 240 - 430 ug/dL    Iron Sat Index 22 15 - 46 %   Ferritin   Result Value Ref Range    Ferritin 81 31 - 409 ng/mL   Vitamin B12   Result Value Ref Range    Vitamin B12 308 232 - 1,245 pg/mL

## 2024-11-01 NOTE — PROGRESS NOTES
Preventive Care Visit  [facility]  Dipesh Oviedo MD, MD, Internal Medicine  Nov 1, 2024        Hardeep Echeverria is a 84 year old, presenting for the following:  Physical            HPI    Health Care Directive  Patient does not have a Health Care Directive: Discussed advance care planning with patient; however, patient declined at this time.      11/1/2024   General Health   How would you rate your overall physical health? (!) FAIR   Feel stress (tense, anxious, or unable to sleep) Not at all            11/1/2024   Nutrition   Diet: Regular (no restrictions)            11/1/2024   Exercise   Days per week of moderate/strenous exercise 7 days   Average minutes spent exercising at this level 30 min            11/1/2024   Social Factors   Frequency of gathering with friends or relatives Never   Worry food won't last until get money to buy more No   Food not last or not have enough money for food? No   Do you have housing? (Housing is defined as stable permanent housing and does not include staying ouside in a car, in a tent, in an abandoned building, in an overnight shelter, or couch-surfing.) Yes   Are you worried about losing your housing? No   Lack of transportation? No   Unable to get utilities (heat,electricity)? No      (!) SOCIAL CONNECTIONS CONCERN      11/1/2024   Fall Risk   Fallen 2 or more times in the past year? No    Trouble with walking or balance? No        Patient-reported          11/1/2024   Activities of Daily Living- Home Safety   Needs help with the following daily activites None of the above   Safety concerns in the home None of the above            11/1/2024   Dental   Dentist two times every year? (!) NO            11/1/2024   Hearing Screening   Hearing concerns? None of the above            11/1/2024   Driving Risk Screening   Patient/family members have concerns about driving No            11/1/2024   General Alertness/Fatigue Screening   Have you been more  tired than usual lately? (!) YES            11/1/2024   Urinary Incontinence Screening   Bothered by leaking urine in past 6 months Yes            11/1/2024   TB Screening   Were you born outside of the [place]? No            Today's PHQ-2 Score:       11/1/2024     1:21 PM   PHQ-2 ( 1999 Pf[employer])   Q1: Little interest or pleasure in doing things 1   Q2: Feeling down, depressed or hopeless 1   PHQ-2 Score 2           11/1/2024   Substance Use   Alcohol more than 3/day or more than 7/wk No   Do you have a current opioid prescription? No   How severe/bad is pain from 1 to 10? 0/10 (No Pain)   Do you use any other substances recreationally? No        Social History     Tobacco Use    Smoking status: Former     Current packs/day: 1.00     Average packs/day: 1 pack/day for 30.0 years (30.0 ttl pk-yrs)     Types: Cigarettes    Smokeless tobacco: Former     Quit date: 12/6/1961    Tobacco comments:     2 packs per year   Vaping Use    Vaping status: Never Used   Substance Use Topics    Alcohol use: Yes     Alcohol/week: 0.0 standard drinks of alcohol     Comment: socially    Drug use: No               Reviewed and updated as needed this visit by Provider                    Past Medical History:   Diagnosis Date    Asymptomatic varicose veins     Bladder stone     Chronic depressive personality disorder     Esophageal reflux     Hepatic cyst     Hypertrophy (benign) of prostate 9/94    turp operation    Nephrolithiasis     S/P TURP     Unspecified essential hypertension      Current providers sharing in care for this patient include:  Patient Care Team:  Dipesh Oviedo MD as PCP - General (Internal Medicine)  Daniel Solitario MD as PCP - Internal Medicine  Tiffany Blackmon RN as Registered Nurse  Lenny Stephen PA-C as Assigned PCP    The following health maintenance items are reviewed in Epic and correct as of today:  Health Maintenance   Topic Date Due    COLORECTAL CANCER SCREENING  Never done     "ZOSTER IMMUNIZATION (1 of 2) Never done    RSV VACCINE (1 - 1-dose 75+ series) Never done    ADVANCE CARE PLANNING  04/19/2018    MEDICARE ANNUAL WELLNESS VISIT  07/20/2019    ANNUAL REVIEW OF HM ORDERS  08/03/2023    INFLUENZA VACCINE (1) 09/01/2024    COVID-19 Vaccine (3 - 2024-25 season) 09/01/2024    FALL RISK ASSESSMENT  11/01/2025    DTAP/TDAP/TD IMMUNIZATION (2 - Td or Tdap) 02/22/2029    PHQ-2 (once per calendar year)  Completed    Pneumococcal Vaccine: 65+ Years  Completed    HPV IMMUNIZATION  Aged Out    MENINGITIS IMMUNIZATION  Aged Out    RSV MONOCLONAL ANTIBODY  Aged Out        Benign prostatic hyperplasia (BPH) with straining on urination  S/P TURP   Doing well with Flomax and following annually with urology.  No acute concerns  NAFLD (nonalcoholic fatty liver disease)   Notes that he is not drinking alcohol regularly, but diet could be improved.    Caregiver Fatigue   Has had dififuclyt caring for his wife.  She requires close supervision     Review of Systems  Constitutional, HEENT, cardiovascular, pulmonary, GI, , musculoskeletal - left shoulder surgery,, neuro, skin, endocrine and psychiatry - care given fatigue,  systems are negative, except as otherwise noted.     Objective    Exam  /77 (BP Location: Right arm, Patient Position: Sitting, Cuff Size: Adult Regular)   Pulse 82   Temp 98.9  F (37.2  C) (Tympanic)   Resp 17   Ht 1.727 m (5' 8\")   Wt 74.1 kg (163 lb 6.4 oz)   SpO2 97%   BMI 24.84 kg/m     Estimated body mass index is 24.84 kg/m  as calculated from the following:    Height as of this encounter: 1.727 m (5' 8\").    Weight as of this encounter: 74.1 kg (163 lb 6.4 oz).    Physical Exam  GENERAL: alert and no distress  EYES: Eyes grossly normal to inspection,   HENT: ear canals and TM's normal, nose and mouth without ulcers or lesions  NECK: no adenopathy, no asymmetry, masses, or scars  RESP: lungs clear to auscultation - no rales, rhonchi or wheezes  CV: regular rate and " rhythm, normal S1 S2, no S3 or S4, no murmur,   ABDOMEN: soft, nontender, no hepatosplenomegaly   MS: no gross musculoskeletal defects noted, no edema  SKIN: no suspicious lesions or rashes  NEURO: Normal strength and tone, mentation intact and speech normal  PSYCH: mentation appears normal, affect normal/bright         11/1/2024   Mini Cog   Clock Draw Score 2 Normal   3 Item Recall 2 objects recalled   Mini Cog Total Score 4              Patient Instructions   (Z00.00) Routine general medical examination at a health care facility  (primary encounter diagnosis)  Comment: For routine exam, we will draw labs as ordered, cholesterol, diabetes mellitus check, liver function, renal function.  We discussed risks and benefits of screening colonoscopy and this was declined we will also update vaccination history. Shingrix vaccine is now available.  I would call your insurance to see if a shingles vaccine is covered and get this at your pharmacy  Plan:     (N40.1,  R39.16) Benign prostatic hyperplasia (BPH) with straining on urination  Comment: Continue tamsulosin and saw palmetto and follow-up with urology  Plan:     (Z90.79) S/P TURP  Comment: As above  Plan:     (K76.0) NAFLD (nonalcoholic fatty liver disease)  Comment: Recommend recheck abdominal ultrasound when you are able to [facility] phone #[phone]   Plan:        Caregiver Fatigue  Comment; recommend follow up in care coordination     Signed Electronically by: Dipesh Oviedo MD, MD

## 2024-11-01 NOTE — LETTER
November 4, 2024      Juwan Montoya  [address] [place] [facility] [address]        Dear ,    We are writing to inform you of your test results.    Hi Juwan,     I have had the opportunity to review your recent results and an interpretation is as follows:   Your follow-up hemoglobin returned low again, and I recommend we add on iron studies, vitamin B12 and folic acid if you are able to     Sincerely,   Dipesh Oviedo MD     Resulted Orders   CBC with platelets   Result Value Ref Range    WBC Count 4.9 4.0 - 11.0 10e3/uL    RBC Count 4.24 (L) 4.40 - 5.90 10e6/uL    Hemoglobin 12.6 (L) 13.3 - 17.7 g/dL    Hematocrit 41.0 40.0 - 53.0 %    MCV 97 78 - 100 fL    MCH 29.7 26.5 - 33.0 pg    MCHC 30.7 (L) 31.5 - 36.5 g/dL    RDW 13.2 10.0 - 15.0 %    Platelet Count 230 150 - 450 10e3/uL

## 2024-11-02 LAB
ALBUMIN SERPL BCG-MCNC: 4.4 G/DL (ref 3.5–5.2)
ALP SERPL-CCNC: 54 U/L (ref 40–150)
ALT SERPL W P-5'-P-CCNC: 26 U/L (ref 0–70)
ANION GAP SERPL CALCULATED.3IONS-SCNC: 8 MMOL/L (ref 7–15)
AST SERPL W P-5'-P-CCNC: 24 U/L (ref 0–45)
BILIRUB SERPL-MCNC: 0.3 MG/DL
BUN SERPL-MCNC: 16.4 MG/DL (ref 8–23)
CALCIUM SERPL-MCNC: 9.8 MG/DL (ref 8.8–10.4)
CHLORIDE SERPL-SCNC: 107 MMOL/L (ref 98–107)
CHOLEST SERPL-MCNC: 156 MG/DL
CREAT SERPL-MCNC: 0.83 MG/DL (ref 0.67–1.17)
EGFRCR SERPLBLD CKD-EPI 2021: 86 ML/MIN/1.73M2
FASTING STATUS PATIENT QL REPORTED: NO
FASTING STATUS PATIENT QL REPORTED: NO
FERRITIN SERPL-MCNC: 81 NG/ML (ref 31–409)
GLUCOSE SERPL-MCNC: 99 MG/DL (ref 70–99)
HCO3 SERPL-SCNC: 26 MMOL/L (ref 22–29)
HDLC SERPL-MCNC: 63 MG/DL
IRON BINDING CAPACITY (ROCHE): 362 UG/DL (ref 240–430)
IRON SATN MFR SERPL: 22 % (ref 15–46)
IRON SERPL-MCNC: 78 UG/DL (ref 61–157)
LDLC SERPL CALC-MCNC: 79 MG/DL
NONHDLC SERPL-MCNC: 93 MG/DL
POTASSIUM SERPL-SCNC: 5.1 MMOL/L (ref 3.4–5.3)
PROT SERPL-MCNC: 6.8 G/DL (ref 6.4–8.3)
SODIUM SERPL-SCNC: 141 MMOL/L (ref 135–145)
TRIGL SERPL-MCNC: 68 MG/DL
VIT B12 SERPL-MCNC: 308 PG/ML (ref 232–1245)

## 2024-11-04 ENCOUNTER — PATIENT OUTREACH (OUTPATIENT)
Dept: CARE COORDINATION | Facility: CLINIC | Age: 84
End: 2024-11-04
Payer: MEDICARE

## 2024-11-04 NOTE — PROGRESS NOTES
Clinic Care Coordination Contact  [facility]/Voicemail    Clinical Data: Care Coordinator Outreach    Outreach Documentation Number of Outreach Attempt   11/4/2024   9:57 AM 1       The Community Health Worker called for a Care Coordination outreach to offer the CC program.  Spoke with patient's wife.    Patient's wife stated her  is not available to talk now.  Patient's wife requested a call back.      Plan: Care Coordinator will try to reach patient again in 1-2 business days.    ADAM Garcia  Clinic Care Coordination  [facility]: [place], and [facility]  Phone: [phone]

## 2024-11-04 NOTE — RESULT ENCOUNTER NOTE
Jarrod Echeverria,    I had the opportunity to review your recent labs and a summary of your labs reads as follows:    Your complete blood counts show persistent anemia  Your iron studies, vitamin b12 and iron studies returned stable and within normal limits   Your comprehensive metabolic panel showed normal renal function, normal liver function, and normal fasting blood glucose indicating no evidence of diabetes mellitus.  Your fasting lipid panel show  - normal HDL (good) cholesterol -as your goal is greater than 40  - low LDL (bad) cholesterol as your goal is less than 100  - normal triglyceride levels          Sincerely,  Dipesh Oviedo MD

## 2024-11-05 NOTE — PROGRESS NOTES
Clinic Care Coordination Contact  Community Health Worker Initial Outreach    CHW introduced self, intent of call, and offered the CC program.  Spoke with patient.    Patient stated he is looking for various in home support for his disabled wife.    Reason for Referral:  Patient/Caregiver Support  Resources for Support    CHW Initial Information Gathering:  Referral Source: PCP  Preferred Hospital: [facility]  [phone]  Current living arrangement:: I live in a private home with spouse  Type of residence:: Private home - stairs  Community Resources: None  Supplies Currently Used at Home: None  Equipment Currently Used at Home: none  Informal Support system:: Friends  No PCP office visit in Past Year: No  Transportation means:: Regular car  CHW Additional Questions  If ED/Hospital discharge, follow-up appointment scheduled as recommended?: N/A  Medication changes made following ED/Hospital discharge?: N/A  MyChart active?: No  Patient agreeable to assistance with activating MyChart?: No    Patient accepts CC: Yes. Patient scheduled for assessment with FAVIOLA Martins on 11/13/24 at 2:00pm. Patient noted desire to discuss resources for support- bathtub for disabled wife and CC support.     Arti Mcelroy, ADAM  Clinic Care Coordination  [facility]: [place], [place], [place], and [facility]  Phone: [phone]

## 2024-11-13 ENCOUNTER — PATIENT OUTREACH (OUTPATIENT)
Dept: CARE COORDINATION | Facility: CLINIC | Age: 84
End: 2024-11-13

## 2024-11-13 ENCOUNTER — PATIENT OUTREACH (OUTPATIENT)
Dept: NURSING | Facility: CLINIC | Age: 84
End: 2024-11-13
Payer: COMMERCIAL

## 2024-11-13 ASSESSMENT — ACTIVITIES OF DAILY LIVING (ADL): DEPENDENT_IADLS:: INDEPENDENT

## 2024-11-13 NOTE — LETTER
[facility] CARE COORDINATION  [address]  [facility] [address]    November 13, 2024    Juwan Montoya  [address]  [facility] [address]      Dear Juwan,    I am a clinic care coordinator who works with Dipesh Oviedo MD, MD with the [facility]. I wanted to thank you for spending the time to talk with me.  Below is a description of clinic care coordination and how I can further assist you.       The clinic care coordination team is made up of a registered nurse, , financial resource worker and community health worker who understand the health care system. The goal of clinic care coordination is to help you manage your health and improve access to the health care system. Our team works alongside your provider to assist you in determining your health and social needs. We can help you obtain health care and community resources, providing you with necessary information and education. We can work with you through any barriers and develop a care plan that helps coordinate and strengthen the communication between you and your care team.  Our services are voluntary and are offered without charge to you personally.    Please feel free to contact me with any questions or concerns regarding care coordination and what we can offer.      We are focused on providing you with the highest-quality healthcare experience possible.    Sincerely,     Lakshmi Diaz,  [facility]  Clinic Care Coordinator  [facility]  [phone]  [email or web address]      Enclosed: Resources

## 2024-11-13 NOTE — PROGRESS NOTES
"Clinic Care Coordination Contact  Clinic Care Coordination Contact  OUTREACH    Referral Information:  Referral Source: PCP  Primary Diagnosis: Psychosocial  SW called and spoke to pt and spouse at length.  They have lived in their home for many decades, but are finding that there are some challenges with spouse's limited mobility.  Particularly challenging is bathroom accessibility.  Spouse shares that she should bathe more but there is a tub in the main level bath, and she cannot get in or out of the tub very well.  There is a shower in the basement but she is not supposed to use the stairs due to limited mobility and falls.  Spouse installed a grab bar in the bathroom which helps somewhat.  Spouse asks if SW knows any inexpensive ways to get a handicap accessible tub put in.  SW shares that there are not any resources for people until they are eligible for Medicaid, and spouse is aware that they are over income for Medicaid.  We discussed how we could work on a waiver for spouse in the future if needed.  SW also mentions that they could hire a HHA who could assist spouse with bathing.  Pt mentions that  he has considered that but finances are the issue.    Pt and spouse share that they do not have family nearby and spouse doesn't get to interact with neighbors very often due to the challenges of leaving the home.  SW asks about driving, and only pt is driving, and he is able to get pt in their vehicle but getting in/out of the house is the challenge.  We discussed transportation resources as well but none are needed currently.   We talked about finances, and pt/spouse share that they have a reverse mortgage and that they live \"paycheck to paycheck\" and this is becoming increasingly challenging with inflation.  SW shared that we have a program called [employer], and describes how it can reduce food costs.  Pt is very interested in learning more before signing up, and SW will send information for pt to read.    At " this time pt and spouse and pt feel their needs are met, but appreciate very much having a  they can reach out to if there are questions or needs.      Chief Complaint   Patient presents with    Clinic Care Coordination - Initial        Universal Utilization:   Clinic Utilization  No PCP office visit in Past Year: No  Utilization      No Show Count (past year)  0             ED Visits  1             Hospital Admissions  0                    Current as of: 11/13/2024  1:37 PM                Clinical Concerns:  Current Medical Concerns:  Psychosocial     Current Behavioral Concerns: N/A    Education Provided to patient: CCC, [employer],  DME, home renovations      Health Maintenance Reviewed: Due/Overdue   Health Maintenance Due   Topic Date Due    COLORECTAL CANCER SCREENING  Never done    ZOSTER IMMUNIZATION (1 of 2) Never done    RSV VACCINE (1 - 1-dose 75+ series) Never done    INFLUENZA VACCINE (1) 09/01/2024    COVID-19 Vaccine (3 - 2024-25 season) 09/01/2024       Clinical Pathway: None    Medication Management:  Medication review status:        Functional Status:  Dependent ADLs:: Independent  Dependent IADLs:: Independent  Mobility Status: Independent  Fallen 2 or more times in the past year?: No    Living Situation:  Current living arrangement:: I live in a private home with spouse  [facility]    Lifestyle & Psychosocial Needs:    Social Drivers of Health     Food Insecurity: Low Risk  (11/1/2024)    Food Insecurity     Within the past 12 months, did you worry that your food would run out before you got money to buy more?: No     Within the past 12 months, did the food you bought just not last and you didn t have money to get more?: No   Depression: Not at risk (11/1/2024)    PHQ-2     PHQ-2 Score: 2   Housing Stability: Low Risk  (11/1/2024)    Housing Stability     Do you have housing? : Yes     Are you worried about losing your housing?: No   Tobacco Use: Medium  Risk (11/1/2024)    Patient History     Smoking Tobacco Use: Former     Smokeless Tobacco Use: Former     Passive Exposure: Not on file   Financial Resource Strain: Low Risk  (11/1/2024)    Financial Resource Strain     Within the past 12 months, have you or your family members you live with been unable to get utilities (heat, electricity) when it was really needed?: No   Alcohol Use: Not on file   Transportation Needs: Low Risk  (11/1/2024)    Transportation Needs     Within the past 12 months, has lack of transportation kept you from medical appointments, getting your medicines, non-medical meetings or appointments, work, or from getting things that you need?: No   Physical Activity: Sufficiently Active (11/1/2024)    Exercise Vital Sign     Days of Exercise per Week: 7 days     Minutes of Exercise per Session: 30 min   Interpersonal Safety: Low Risk  (11/1/2024)    Interpersonal Safety     Do you feel physically and emotionally safe where you currently live?: Yes     Within the past 12 months, have you been hit, slapped, kicked or otherwise physically hurt by someone?: No     Within the past 12 months, have you been humiliated or emotionally abused in other ways by your partner or ex-partner?: No   Stress: No Stress Concern Present (11/1/2024)    [employer] [place] [employer] - Occupational Stress Questionnaire     Feeling of Stress : Not at all   Social Connections: Unknown (11/1/2024)    Social Connection and Isolation Panel [NHANES]     Frequency of Communication with Friends and Family: Not on file     Frequency of Social Gatherings with Friends and Family: Never     Attends Confucianist Services: Not on file     Active Member of Clubs or Organizations: Not on file     Attends Club or Organization Meetings: Not on file     Marital Status: Not on file   Health Literacy: Not on file        Transportation means:: Regular car     Informal Support system:: Friends             Resources and  Interventions:  Current Resources:      Community Resources: None  Supplies Currently Used at Home: None  Equipment Currently Used at Home: none  Employment Status: retired                        Care Plan:      Patient/Caregiver understanding: yes         Future Appointments                In 11 months Dipesh Oviedo MD [facility] LYUBOV [facility]            Plan: Pt and spouse share that they are doing okay for now, and are very happy to have SW contact information for future reference.     Lakshmi Diaz  [facility]  Clinic Care Coordinator  [facility]  [phone]  [email or web address]

## 2024-11-25 ENCOUNTER — ANCILLARY PROCEDURE (OUTPATIENT)
Dept: ULTRASOUND IMAGING | Facility: CLINIC | Age: 84
End: 2024-11-25
Attending: INTERNAL MEDICINE
Payer: MEDICARE

## 2024-11-25 DIAGNOSIS — K76.0 NAFLD (NONALCOHOLIC FATTY LIVER DISEASE): ICD-10-CM

## 2024-11-25 PROCEDURE — 76705 ECHO EXAM OF ABDOMEN: CPT

## 2024-11-27 NOTE — RESULT ENCOUNTER NOTE
Jarrod Echeverria,    I have had the opportunity to review your recent results and an interpretation is as follows:  Your follow-up abdominal ultrasound shows evidence of persistent fatty liver disease    Sincerely,  Dipesh Oviedo MD

## 2025-05-12 ENCOUNTER — OFFICE VISIT (OUTPATIENT)
Dept: URGENT CARE | Facility: URGENT CARE | Age: 85
End: 2025-05-12
Payer: COMMERCIAL

## 2025-05-12 ENCOUNTER — ANCILLARY PROCEDURE (OUTPATIENT)
Dept: GENERAL RADIOLOGY | Facility: CLINIC | Age: 85
End: 2025-05-12
Attending: NURSE PRACTITIONER
Payer: COMMERCIAL

## 2025-05-12 VITALS
OXYGEN SATURATION: 96 % | TEMPERATURE: 98.1 F | BODY MASS INDEX: 24.4 KG/M2 | HEIGHT: 68 IN | DIASTOLIC BLOOD PRESSURE: 73 MMHG | WEIGHT: 161 LBS | SYSTOLIC BLOOD PRESSURE: 119 MMHG | HEART RATE: 78 BPM | RESPIRATION RATE: 16 BRPM

## 2025-05-12 DIAGNOSIS — M79.674 PAIN OF TOE OF RIGHT FOOT: ICD-10-CM

## 2025-05-12 DIAGNOSIS — M79.89 SWELLING OF TOE OF RIGHT FOOT: ICD-10-CM

## 2025-05-12 DIAGNOSIS — S99.921A TOE INJURY, RIGHT, INITIAL ENCOUNTER: Primary | ICD-10-CM

## 2025-05-12 PROCEDURE — 99213 OFFICE O/P EST LOW 20 MIN: CPT | Performed by: NURSE PRACTITIONER

## 2025-05-12 PROCEDURE — 3074F SYST BP LT 130 MM HG: CPT | Performed by: NURSE PRACTITIONER

## 2025-05-12 PROCEDURE — 1125F AMNT PAIN NOTED PAIN PRSNT: CPT | Performed by: NURSE PRACTITIONER

## 2025-05-12 PROCEDURE — 3078F DIAST BP <80 MM HG: CPT | Performed by: NURSE PRACTITIONER

## 2025-05-12 PROCEDURE — 73660 X-RAY EXAM OF TOE(S): CPT | Mod: TC | Performed by: STUDENT IN AN ORGANIZED HEALTH CARE EDUCATION/TRAINING PROGRAM

## 2025-05-12 ASSESSMENT — PAIN SCALES - GENERAL: PAINLEVEL_OUTOF10: MODERATE PAIN (4)

## 2025-05-12 NOTE — PROGRESS NOTES
Assessment & Plan     1. Toe injury, right, initial encounter (Primary)    - XR Toe Right G/E 2 Views    2. Swelling of toe of right foot    - XR Toe Right G/E 2 Views    3. Pain of toe of right foot    - XR Toe Right G/E 2 Views    No fracture break per radiology read noted on x-ray.    Recommend home care to include elevation, rest, ice, Tylenol as needed for discomfort.    We discussed if symptoms persist or continue greater than 2 weeks would recommend follow-up with primary unless symptoms worsened would recommend returning to clinic for further evaluation.    Patient verbalized understanding was agreement this plan.      [facility] NOEMI Echeverria is a 85 year old male who presents to clinic today for the following health issues:  Chief Complaint   Patient presents with    Urgent Care    Toe Injury     Pt presents to clinic with possible broken toe (right foot middle toe) Pt stubbed against wood furniture onset two weeks ago. Pain did not subside, red and swollen, kept him awake at night   Took Tylenol last night and iced it last night          5/12/2025     3:12 PM   Additional Questions   Roomed by Bonny   Accompanied by Jackeline - Spouse     HPI      MS Injury/Pain    Onset of symptoms was 3 week(s) ago.  Location: right toe(s) third  Context:       The injury happened while at home      Mechanism: direct blow      Patient experienced immediate pain, delayed swelling, was able to bear weight directly after injury, no deformity was noted by the patient  Course of symptoms is worsening.    Severity moderate  Current and Associated symptoms: Pain, Swelling, Redness, Tenderness, Decreased range of motion, and Stiffness  Denies  Bruising and Warmth  Aggravating Factors: walking, weight-bearing, movement, and exertion  Therapies to improve symptoms include: none  This is the first time this type of problem has occurred for this patient.       Review  "of Systems  Constitutional, HEENT, cardiovascular, pulmonary, gi and gu systems are negative, except as otherwise noted.      Objective    /73   Pulse 78   Temp 98.1  F (36.7  C) (Tympanic)   Resp 16   Ht 1.727 m (5' 8\")   Wt 73 kg (161 lb)   SpO2 96%   BMI 24.48 kg/m    Physical Exam   GENERAL: alert and no distress  NECK: no adenopathy, no asymmetry, masses, or scars  RESP: lungs clear to auscultation - no rales, rhonchi or wheezes  CV: regular rate and rhythm, normal S1 S2, no S3 or S4, no murmur, click or rub, no peripheral edema  MS: Right foot third toe tenderness with palpation erythema noted with significant swelling CMS intact pulses normal.  SKIN: Right great toe erythema CMS intact.  Results for orders placed or performed in visit on 05/12/25   XR Toe Right G/E 2 Views     Status: None    Narrative    EXAM: XR TOE RIGHT G/E 2 VIEWS  LOCATION: [facility] [place]  DATE: 5/12/2025    INDICATION:  Toe injury, right, initial encounter, Swelling of toe of right foot, Pain of toe of right foot  COMPARISON: None.      Impression    IMPRESSION: Soft tissue swelling in the second toe.  No discrete fracture in the right foot, with specific attention to the second toe. Preserved joint alignment. Osseous demineralization.             "

## 2025-05-12 NOTE — PROGRESS NOTES
Urgent Care Clinic Visit    Chief Complaint   Patient presents with    Urgent Care    Toe Injury     Pt presents to clinic with possible broken toe (right foot middle toe) Pt stubbed against wood furniture onset two weeks ago. Pain did not subside, red and swollen, kept him awake at night   Took Tylenol last night and iced it last night                5/12/2025     3:12 PM   Additional Questions   Roomed by Bonny   Accompanied by Jackeline Brown

## 2025-07-30 ENCOUNTER — APPOINTMENT (OUTPATIENT)
Dept: CT IMAGING | Facility: CLINIC | Age: 85
End: 2025-07-30
Attending: EMERGENCY MEDICINE
Payer: COMMERCIAL

## 2025-07-30 ENCOUNTER — HOSPITAL ENCOUNTER (EMERGENCY)
Facility: CLINIC | Age: 85
Discharge: HOME | End: 2025-07-30
Attending: EMERGENCY MEDICINE
Payer: COMMERCIAL

## 2025-07-30 VITALS
HEART RATE: 83 BPM | OXYGEN SATURATION: 97 % | RESPIRATION RATE: 16 BRPM | TEMPERATURE: 97.6 F | DIASTOLIC BLOOD PRESSURE: 72 MMHG | SYSTOLIC BLOOD PRESSURE: 117 MMHG

## 2025-07-30 DIAGNOSIS — N39.0 URINARY TRACT INFECTION WITHOUT HEMATURIA, SITE UNSPECIFIED: Primary | ICD-10-CM

## 2025-07-30 DIAGNOSIS — G89.29 CHRONIC LEFT-SIDED LOW BACK PAIN WITHOUT SCIATICA: ICD-10-CM

## 2025-07-30 DIAGNOSIS — M54.50 CHRONIC LEFT-SIDED LOW BACK PAIN WITHOUT SCIATICA: ICD-10-CM

## 2025-07-30 LAB
ALBUMIN UR-MCNC: 10 MG/DL
ALBUMIN UR-MCNC: 30 MG/DL
AMORPH CRY #/AREA URNS HPF: ABNORMAL /HPF
AMORPH CRY #/AREA URNS HPF: ABNORMAL /HPF
ANION GAP SERPL CALCULATED.3IONS-SCNC: 12 MMOL/L (ref 7–15)
APPEARANCE UR: ABNORMAL
APPEARANCE UR: ABNORMAL
BASOPHILS # BLD AUTO: 0 10E3/UL (ref 0–0.2)
BASOPHILS NFR BLD AUTO: 1 %
BILIRUB UR QL STRIP: NEGATIVE
BILIRUB UR QL STRIP: NEGATIVE
BUN SERPL-MCNC: 13.7 MG/DL (ref 8–23)
CALCIUM SERPL-MCNC: 9.5 MG/DL (ref 8.8–10.4)
CHLORIDE SERPL-SCNC: 105 MMOL/L (ref 98–107)
COLOR UR AUTO: ABNORMAL
COLOR UR AUTO: ABNORMAL
CREAT SERPL-MCNC: 0.9 MG/DL (ref 0.67–1.17)
EGFRCR SERPLBLD CKD-EPI 2021: 84 ML/MIN/1.73M2
EOSINOPHIL # BLD AUTO: 0.1 10E3/UL (ref 0–0.7)
EOSINOPHIL NFR BLD AUTO: 1 %
ERYTHROCYTE [DISTWIDTH] IN BLOOD BY AUTOMATED COUNT: 13.6 % (ref 10–15)
GLUCOSE SERPL-MCNC: 117 MG/DL (ref 70–99)
GLUCOSE UR STRIP-MCNC: NEGATIVE MG/DL
GLUCOSE UR STRIP-MCNC: NEGATIVE MG/DL
HCO3 SERPL-SCNC: 24 MMOL/L (ref 22–29)
HCT VFR BLD AUTO: 37.4 % (ref 40–53)
HGB BLD-MCNC: 12.2 G/DL (ref 13.3–17.7)
HGB UR QL STRIP: ABNORMAL
HGB UR QL STRIP: NEGATIVE
HOLD SPECIMEN: NORMAL
HYALINE CASTS: 2 /LPF
IMM GRANULOCYTES # BLD: 0 10E3/UL
IMM GRANULOCYTES NFR BLD: 0 %
KETONES UR STRIP-MCNC: NEGATIVE MG/DL
KETONES UR STRIP-MCNC: NEGATIVE MG/DL
LEUKOCYTE ESTERASE UR QL STRIP: ABNORMAL
LEUKOCYTE ESTERASE UR QL STRIP: ABNORMAL
LYMPHOCYTES # BLD AUTO: 1.2 10E3/UL (ref 0.8–5.3)
LYMPHOCYTES NFR BLD AUTO: 23 %
MCH RBC QN AUTO: 30.5 PG (ref 26.5–33)
MCHC RBC AUTO-ENTMCNC: 32.6 G/DL (ref 31.5–36.5)
MCV RBC AUTO: 94 FL (ref 78–100)
MONOCYTES # BLD AUTO: 0.4 10E3/UL (ref 0–1.3)
MONOCYTES NFR BLD AUTO: 7 %
MUCOUS THREADS #/AREA URNS LPF: PRESENT /LPF
MUCOUS THREADS #/AREA URNS LPF: PRESENT /LPF
NEUTROPHILS # BLD AUTO: 3.4 10E3/UL (ref 1.6–8.3)
NEUTROPHILS NFR BLD AUTO: 67 %
NITRATE UR QL: NEGATIVE
NITRATE UR QL: NEGATIVE
NRBC # BLD AUTO: 0 10E3/UL
NRBC BLD AUTO-RTO: 0 /100
PH UR STRIP: 6 [PH] (ref 5–7)
PH UR STRIP: 6 [PH] (ref 5–7)
PLATELET # BLD AUTO: 221 10E3/UL (ref 150–450)
POTASSIUM SERPL-SCNC: 4.2 MMOL/L (ref 3.4–5.3)
RBC # BLD AUTO: 4 10E6/UL (ref 4.4–5.9)
RBC URINE: 6 /HPF
RBC URINE: <1 /HPF
SODIUM SERPL-SCNC: 141 MMOL/L (ref 135–145)
SP GR UR STRIP: 1.01 (ref 1–1.03)
SP GR UR STRIP: 1.02 (ref 1–1.03)
SQUAMOUS EPITHELIAL: <1 /HPF
UROBILINOGEN UR STRIP-MCNC: 0.2 MG/DL
UROBILINOGEN UR STRIP-MCNC: NORMAL MG/DL
WBC # BLD AUTO: 5 10E3/UL (ref 4–11)
WBC CLUMPS #/AREA URNS HPF: PRESENT /HPF
WBC CLUMPS #/AREA URNS HPF: PRESENT /HPF
WBC URINE: 50 /HPF
WBC URINE: >182 /HPF

## 2025-07-30 PROCEDURE — 81003 URINALYSIS AUTO W/O SCOPE: CPT | Performed by: EMERGENCY MEDICINE

## 2025-07-30 PROCEDURE — 85025 COMPLETE CBC W/AUTO DIFF WBC: CPT | Performed by: EMERGENCY MEDICINE

## 2025-07-30 PROCEDURE — 99284 EMERGENCY DEPT VISIT MOD MDM: CPT | Mod: 25 | Performed by: EMERGENCY MEDICINE

## 2025-07-30 PROCEDURE — 81001 URINALYSIS AUTO W/SCOPE: CPT | Performed by: EMERGENCY MEDICINE

## 2025-07-30 PROCEDURE — 87086 URINE CULTURE/COLONY COUNT: CPT | Performed by: EMERGENCY MEDICINE

## 2025-07-30 PROCEDURE — 250N000013 HC RX MED GY IP 250 OP 250 PS 637: Performed by: EMERGENCY MEDICINE

## 2025-07-30 PROCEDURE — 74176 CT ABD & PELVIS W/O CONTRAST: CPT

## 2025-07-30 PROCEDURE — 36415 COLL VENOUS BLD VENIPUNCTURE: CPT | Performed by: EMERGENCY MEDICINE

## 2025-07-30 PROCEDURE — 80048 BASIC METABOLIC PNL TOTAL CA: CPT | Performed by: EMERGENCY MEDICINE

## 2025-07-30 RX ORDER — CEPHALEXIN 500 MG/1
500 CAPSULE ORAL ONCE
Status: COMPLETED | OUTPATIENT
Start: 2025-07-30 | End: 2025-07-30

## 2025-07-30 RX ORDER — CEPHALEXIN 500 MG/1
500 CAPSULE ORAL 2 TIMES DAILY
Qty: 14 CAPSULE | Refills: 0 | Status: SHIPPED | OUTPATIENT
Start: 2025-07-30 | End: 2025-08-06

## 2025-07-30 RX ADMIN — CEPHALEXIN 500 MG: 500 CAPSULE ORAL at 13:35

## 2025-07-30 ASSESSMENT — COLUMBIA-SUICIDE SEVERITY RATING SCALE - C-SSRS
6. HAVE YOU EVER DONE ANYTHING, STARTED TO DO ANYTHING, OR PREPARED TO DO ANYTHING TO END YOUR LIFE?: NO
2. HAVE YOU ACTUALLY HAD ANY THOUGHTS OF KILLING YOURSELF IN THE PAST MONTH?: NO
1. IN THE PAST MONTH, HAVE YOU WISHED YOU WERE DEAD OR WISHED YOU COULD GO TO SLEEP AND NOT WAKE UP?: NO

## 2025-07-30 ASSESSMENT — ACTIVITIES OF DAILY LIVING (ADL)
ADLS_ACUITY_SCORE: 42

## 2025-07-30 NOTE — DISCHARGE INSTRUCTIONS
Discharge Instructions  Urinary Tract Infection  You or your child have been diagnosed with a urinary tract infection, or UTI. The urinary tract includes the kidneys (which make urine/pee), ureters (the tubes that carry urine/pee from the kidneys to the bladder), the bladder (which stores urine/pee), and urethra (the tube that carries urine/pee out of the bladder). Urinary tract infections occur when bacteria travel up the urethra into the bladder (bladder infection) and, in some cases, from there into the kidneys (kidney infection).  Generally, every Emergency Department visit should have a follow-up clinic visit with either a primary or a specialty clinic/provider. Please follow-up as instructed by your emergency provider today.  Return to the Emergency Department if:  You or your child have severe back pain.  You or your child are vomiting (throwing up) so that you cannot take your medicine.  You or your child have a new fever (had not previously had a fever) over 101 F.  You or your child have confusion or are very weak, or feel very ill.  Your child seems much more ill, will not wake up, will not respond right, or is crying for a long time and will not calm down.  You or your child are showing signs of dehydration. These signs may include decreased urination (pee), dry mouth/gums/tongue, or decreased activity.    Follow-up with your provider:   Children under 24 months need to be seen by their regular provider within one week after a diagnosis of a UTI. It may be necessary to do some more tests to look at the child s kidney or bladder.  You should begin to feel better within 24 - 48 hours of starting your antibiotic; follow-up with your regular clinic/doctor/provider if this is not the case.    Treatment:   You will be treated with an antibiotic to kill the bacteria. We have to make an educated guess, based on what we know about common bacteria and antibiotics, as to which antibiotic will work for your  "infection. We will be correct most times but there will be some cases where the antibiotic chosen is not correct (see urine cultures below).  Take a pain medication such as acetaminophen (Tylenol ) or ibuprofen (Advil , Motrin , Nuprin ).  Phenazopyridine (Pyridium , Uristat ) is a prescription medication that numbs the bladder to reduce the burning pain of some UTIs.  The same medication is available in a non-prescription version (Azo-Standard , Urodol ). This medication will change the color of the urine and tears (usually blue or orange). If you wear contacts, do not wear them while taking this medication as they may be stained by the medication.    Urine Cultures:  If indicated, a urine culture may have been performed today. This test generally takes 24-48 hours to complete so the results are not known at this time. The results can confirm that an infection is present but also determine which antibiotic is effective for the specific bacteria that is causing the infection. If your urine culture shows that the antibiotic you were given today will not work to treat your infection, we will attempt to contact you to make arrangements to change the antibiotic. If the culture confirms that the antibiotic is effective for your infection, you will not be contacted. We often recommend follow-up with your regular physician/provider on the culture results regardless of this process.    Antibiotic Warning:   If you have been placed on antibiotics - watch for signs of allergic reaction.  These include rash, lip swelling, difficulty breathing, wheezing, and dizziness.  If you develop any of these symptoms, stop the antibiotic immediately and go to an emergency room or urgent care for evaluation.    Probiotics: If you have been given an antibiotic, you may want to also take a probiotic pill or eat yogurt with live cultures. Probiotics have \"good bacteria\" to help your intestines stay healthy. Studies have shown that probiotics " help prevent diarrhea and other intestine problems (including C. diff infection) when you take antibiotics. You can buy these without a prescription in the pharmacy section of the store.   If you were given a prescription for medicine here today, be sure to read all of the information (including the package insert) that comes with your prescription.  This will include important information about the medicine, its side effects, and any warnings that you need to know about.  The pharmacist who fills the prescription can provide more information and answer questions you may have about the medicine.  If you have questions or concerns that the pharmacist cannot address, please call or return to the Emergency Department.   Remember that you can always come back to the Emergency Department if you are not able to see your regular provider in the amount of time listed above, if you get any new symptoms, or if there is anything that worries you.    Discharge Instructions  Back Pain  You were seen today for back pain. Back pain can have many causes, but most will get better without surgery or other specific treatment. Sometimes there is a herniated ( slipped ) disc. We do not usually do MRI scans to look for these right away, since most herniated discs will get better on their own with time.  Today, we did not find any evidence that your back pain was caused by a serious condition. However, sometimes symptoms develop over time and cannot be found during an emergency visit, so it is very important that you follow up with your primary provider.  Generally, every Emergency Department visit should have a follow-up clinic visit with either a primary or a specialty clinic/provider. Please follow-up as instructed by your emergency provider today.    Return to the Emergency Department if:  You develop a fever with your back pain.   You have weakness or change in sensation in one or both legs.  You lose control of your bowels or  bladder, or cannot empty your bladder (cannot pee).  Your pain gets much worse.     Follow-up with your provider:  Unless your pain has completely gone away, please make an appointment with your provider within one week. Most of the routine care for back pain is available in a clinic and not the Emergency Department. You may need further management of your back pain, such as more pain medication, imaging such as an X-ray or MRI, or physical therapy.    What can I do to help myself?  Remain Active -- People are often afraid that they will hurt their back further or delay recovery by remaining active, but this is one of the best things you can do for your back. In fact, staying in bed for a long time to rest is not recommended. Studies have shown that people with low back pain recover faster when they remain active. Movement helps to bring blood flow to the muscles and relieve muscle spasms as well as preventing loss of muscle strength.  Heat -- Using a heating pad can help with low back pain during the first few weeks. Do not sleep with a heating pad, as you can be burned.   Pain medications - You may take a pain medication such as Tylenol  (acetaminophen), Advil , Motrin  (ibuprofen) or Aleve  (naproxen).  If you were given a prescription for medicine here today, be sure to read all of the information (including the package insert) that comes with your prescription.  This will include important information about the medicine, its side effects, and any warnings that you need to know about.  The pharmacist who fills the prescription can provide more information and answer questions you may have about the medicine.  If you have questions or concerns that the pharmacist cannot address, please call or return to the Emergency Department.   Remember that you can always come back to the Emergency Department if you are not able to see your regular provider in the amount of time listed above, if you get any new symptoms, or  if there is anything that worries you.

## 2025-07-30 NOTE — ED TRIAGE NOTES
"Pt states he thinks he has a kidney stone on the L side. Patient states has been dealing with this on and off x 1 year. Saw primary and was told \"something is there but not sure what'. Patient states the pain is getting worse, did move down into L testicle as well'.      Triage Assessment (Adult)       Row Name 07/30/25 1015          Triage Assessment    Airway WDL WDL        Respiratory WDL    Respiratory WDL WDL        Skin Circulation/Temperature WDL    Skin Circulation/Temperature WDL WDL        Cardiac WDL    Cardiac WDL WDL        Peripheral/Neurovascular WDL    Peripheral Neurovascular WDL WDL        Cognitive/Neuro/Behavioral WDL    Cognitive/Neuro/Behavioral WDL WDL           "

## 2025-07-30 NOTE — ED PROVIDER NOTES
Emergency Department Note      History of Present Illness     Chief Complaint   Flank Pain (/)      HPI   Juwan Montoya is a 85 year old male who presents with intermittent left lower back and flank pain that worsened two days ago. The patient reports chronic intermittent left flank or low back pain for more than two years. He says he had a flare-up two nights ago and had to take 2 Tylenol tablets, which he has never done before for this pain.  That resolved his pain at this time.  He notes his pain radiates to his left-middle abdominal pain only once in a while with flare-ups, but not now. He reports normal BMs. Denies any sharp pain. He denies diarrhea, abdominal pain, blood in stool, loss of control of bowel or bladder. He notes history of kidney stones.     Independent Historian   None    Review of External Notes   None    Past Medical History     Medical History and Problem List   Asymptomatic varicose veins  Bladder stone  Chronic depressive personality disorder  Esophageal reflux   Hepatic cyst  BPH  Nephrolithiasis  S/p TURP  Hypertension    Medications   Prilosec  Flomax    Surgical History   Cystourethroscopy of laser ablation of ureteral stone  ORIF right ankle fracture  Removal of hardware right ankle  TURP    Physical Exam     Patient Vitals for the past 24 hrs:   BP Temp Temp src Pulse Resp SpO2   07/30/25 1014 117/72 97.6  F (36.4  C) Temporal 83 16 97 %     Physical Exam  General:  Sitting on bed.   HENT:  No obvious trauma to head  Right Ear:  External ear normal.   Left Ear:  External ear normal.   Nose:  Nose normal.   Eyes:  Conjunctivae and EOM are normal. Pupils are equal, round, and reactive.   Neck: Normal range of motion. Neck supple. No tracheal deviation present.   CV:  Normal heart sounds. No murmur heard.  Pulm/Chest: Effort normal and breath sounds normal.   Abd: Soft. No distension. There is no tenderness. There is no rigidity, no rebound and no guarding. No CVA tenderness B/L.  No pulsatile abdominal mass.  M/S: Normal range of motion. Muscle strength is +5 proximal and distal in the bilateral lower extremities. Tenderness over the lower left paraspinal musculature. No midline tenderness or step off.  Neuro: Alert. GCS 15. Reflexes +2 B/L patella and achilles  Skin: Skin is warm and dry. No rash noted. Not diaphoretic. No rash of shingles on low back.  Psych: Normal mood and affect. Behavior is normal.     Diagnostics     Lab Results   Labs Ordered and Resulted from Time of ED Arrival to Time of ED Departure   BASIC METABOLIC PANEL (LIMITED OCCURRENCES) - Abnormal       Result Value    Sodium 141      Potassium 4.2      Chloride 105      Carbon Dioxide (CO2) 24      Anion Gap 12      Urea Nitrogen 13.7      Creatinine 0.90      GFR Estimate 84      Calcium 9.5      Glucose 117 (*)    ROUTINE UA WITH MICROSCOPIC REFLEX TO CULTURE - Abnormal    Color Urine Light Yellow      Appearance Urine Cloudy (*)     Glucose Urine Negative      Bilirubin Urine Negative      Ketones Urine Negative      Specific Gravity Urine 1.015      Blood Urine Trace (*)     pH Urine 6.0      Protein Albumin Urine 30 (*)     Urobilinogen Urine 0.2      Nitrite Urine Negative      Leukocyte Esterase Urine Moderate (*)     WBC Clumps Urine Present (*)     Mucus Urine Present (*)     Amorphous Crystals Urine Few (*)     RBC Urine <1      WBC Urine 50 (*)    CBC WITH PLATELETS AND DIFFERENTIAL - Abnormal    WBC Count 5.0      RBC Count 4.00 (*)     Hemoglobin 12.2 (*)     Hematocrit 37.4 (*)     MCV 94      MCH 30.5      MCHC 32.6      RDW 13.6      Platelet Count 221      % Neutrophils 67      % Lymphocytes 23      % Monocytes 7      % Eosinophils 1      % Basophils 1      % Immature Granulocytes 0      NRBCs per 100 WBC 0      Absolute Neutrophils 3.4      Absolute Lymphocytes 1.2      Absolute Monocytes 0.4      Absolute Eosinophils 0.1      Absolute Basophils 0.0      Absolute Immature Granulocytes 0.0      Absolute  NRBCs 0.0     ROUTINE UA WITH MICROSCOPIC REFLEX TO CULTURE - Abnormal    Color Urine Light Yellow      Appearance Urine Slightly Cloudy (*)     Glucose Urine Negative      Bilirubin Urine Negative      Ketones Urine Negative      Specific Gravity Urine 1.018      Blood Urine Negative      pH Urine 6.0      Protein Albumin Urine 10 (*)     Urobilinogen Urine Normal      Nitrite Urine Negative      Leukocyte Esterase Urine Large (*)     WBC Clumps Urine Present (*)     Mucus Urine Present (*)     Amorphous Crystals Urine Few (*)     RBC Urine 6 (*)     WBC Urine >182 (*)     Squamous Epithelials Urine <1      Hyaline Casts Urine 2     URINE CULTURE   URINE CULTURE     Imaging   Abd/pelvis CT no contrast - Stone Protocol   Final Result   IMPRESSION:    1.  No evidence of nephrolithiasis or hydronephrosis.   2.  Small hiatal hernia.   3.  Mild hepatic steatosis.   4.  Prostatomegaly with findings of chronic bladder outlet obstruction.   5.  Colonic diverticulosis without evidence of acute diverticulitis.           Independent Interpretation   None    ED Course      Medications Administered   Medications   cephALEXin (KEFLEX) capsule 500 mg (500 mg Oral $Given 7/30/25 1335)     Procedures   Procedures     Discussion of Management   None    ED Course   ED Course as of 07/30/25 1344   Wed Jul 30, 2025   1121 I obtained history and examined the patient as noted above.     1237 I rechecked and updated the patient.     1327 I rechecked the patient and explained findings. We discussed plan for discharge and patient is in agreement with plan.        Additional Documentation  None    Medical Decision Making / Diagnosis     CMS Diagnoses: None    MIPS   None               MDM   Juwan Montoya is a very pleasant 85 year old male who presents with concern of left low back pain.  He reports this has been an issue for the past 2 years.  It worsened 2 days ago.  No trauma or injury.  Reflexes are intact.  Strength is intact.  No  signs of cauda equina syndrome.  No back pain or fever to suggest epidural abscess or epidural hematoma.  There is no rash of shingles to the back.  Patient has a benign abdominal exam.  There is no left lower quadrant pain to suggest diverticulitis.  There is no CVA tenderness to suggest kidney stone or pyelonephritis, but this was the patient's concern.  His pain is in the lower back.  He does have a history of kidney stones.  Given this, labs and a urine analysis were obtained.  Labs returned unremarkable and CT was negative for acute pathology.  He was provided a copy of the incidentals.  Urine analysis then obtained later.  Initial sample had less 1 mL of urine and unspun sample run which was concerning for UTI so a full sample was collected and run.  This confirms UTI.  His pain is not consistent with pyelonephritis nor is there any CT evidence of pyelonephritis.  Will place the patient on antibiotics for UTI and suspect he has some referred viscerosomatic low back pain or other chronic low back pain.  Encourage close follow-up with primary care physician I discussed his underlying prostate issues may be contributory to his current presentation.  Discussed callback RN will call him if there is a resistant bacteria.  No prior urine cultures on file to provide direction at this time.  He declined IV dose of antibiotics or Tylenol at this time and he desired to be discharged.  Initial dose of p.o. antibiotic medicine provided.    The treatment plan was discussed with the patient and they expressed understanding of this plan and consented to the plan.  In addition, the patient will return to the emergency department if their symptoms persist, worsen, if new symptoms arise or if there is any concern as other pathology may be present that is not evident at this time. They also understand the importance of close follow up in the clinic and if unable to do so will return to the emergency department for a reevaluation.  All questions were answered.    Disposition   The patient was discharged.     Diagnosis     ICD-10-CM    1. Urinary tract infection without hematuria, site unspecified  N39.0       2. Chronic left-sided low back pain without sciatica  M54.50     G89.29            Discharge Medications   Discharge Medication List as of 7/30/2025  1:33 PM        START taking these medications    Details   cephALEXin (KEFLEX) 500 MG capsule Take 1 capsule (500 mg) by mouth 2 times daily for 7 days., Disp-14 capsule, R-0, E-Prescribe               Scribe Disclosure:  Rochelle BREWER, am serving as a scribe at 11:47 AM on 7/30/2025 to document services personally performed by Bert Madison DO based on my observations and the provider's statements to me.        Bert Madison DO  07/30/25 [id number]

## 2025-07-31 LAB — BACTERIA UR CULT: NO GROWTH
